# Patient Record
Sex: MALE | Race: WHITE | NOT HISPANIC OR LATINO | URBAN - METROPOLITAN AREA
[De-identification: names, ages, dates, MRNs, and addresses within clinical notes are randomized per-mention and may not be internally consistent; named-entity substitution may affect disease eponyms.]

---

## 2024-03-11 ENCOUNTER — LAB REQUISITION (OUTPATIENT)
Dept: LAB | Facility: HOSPITAL | Age: 89
End: 2024-03-11
Payer: COMMERCIAL

## 2024-03-11 DIAGNOSIS — F03.90 UNSPECIFIED DEMENTIA, UNSPECIFIED SEVERITY, WITHOUT BEHAVIORAL DISTURBANCE, PSYCHOTIC DISTURBANCE, MOOD DISTURBANCE, AND ANXIETY (MULTI): ICD-10-CM

## 2024-03-11 LAB
ALBUMIN SERPL BCP-MCNC: 3.5 G/DL (ref 3.4–5)
ALP SERPL-CCNC: 71 U/L (ref 33–136)
ALT SERPL W P-5'-P-CCNC: 8 U/L (ref 10–52)
ANION GAP SERPL CALC-SCNC: 11 MMOL/L (ref 10–20)
AST SERPL W P-5'-P-CCNC: 13 U/L (ref 9–39)
BILIRUB SERPL-MCNC: 0.8 MG/DL (ref 0–1.2)
BUN SERPL-MCNC: 17 MG/DL (ref 6–23)
CALCIUM SERPL-MCNC: 8.6 MG/DL (ref 8.6–10.3)
CHLORIDE SERPL-SCNC: 103 MMOL/L (ref 98–107)
CHOLEST SERPL-MCNC: 86 MG/DL (ref 0–199)
CHOLESTEROL/HDL RATIO: 3.4
CO2 SERPL-SCNC: 30 MMOL/L (ref 21–32)
CREAT SERPL-MCNC: 1.38 MG/DL (ref 0.5–1.3)
EGFRCR SERPLBLD CKD-EPI 2021: 48 ML/MIN/1.73M*2
ERYTHROCYTE [DISTWIDTH] IN BLOOD BY AUTOMATED COUNT: 13.7 % (ref 11.5–14.5)
EST. AVERAGE GLUCOSE BLD GHB EST-MCNC: 197 MG/DL
GLUCOSE SERPL-MCNC: 103 MG/DL (ref 74–99)
HBA1C MFR BLD: 8.5 %
HCT VFR BLD AUTO: 33.1 % (ref 41–52)
HDLC SERPL-MCNC: 25.3 MG/DL
HGB BLD-MCNC: 10.7 G/DL (ref 13.5–17.5)
LDLC SERPL CALC-MCNC: 40 MG/DL
MCH RBC QN AUTO: 30 PG (ref 26–34)
MCHC RBC AUTO-ENTMCNC: 32.3 G/DL (ref 32–36)
MCV RBC AUTO: 93 FL (ref 80–100)
NON HDL CHOLESTEROL: 61 MG/DL (ref 0–149)
NRBC BLD-RTO: 0 /100 WBCS (ref 0–0)
PLATELET # BLD AUTO: 227 X10*3/UL (ref 150–450)
POTASSIUM SERPL-SCNC: 3.6 MMOL/L (ref 3.5–5.3)
PROT SERPL-MCNC: 5.4 G/DL (ref 6.4–8.2)
RBC # BLD AUTO: 3.57 X10*6/UL (ref 4.5–5.9)
SODIUM SERPL-SCNC: 140 MMOL/L (ref 136–145)
TRIGL SERPL-MCNC: 102 MG/DL (ref 0–149)
TSH SERPL-ACNC: 3.45 MIU/L (ref 0.44–3.98)
VLDL: 20 MG/DL (ref 0–40)
WBC # BLD AUTO: 8.5 X10*3/UL (ref 4.4–11.3)

## 2024-03-11 PROCEDURE — 84443 ASSAY THYROID STIM HORMONE: CPT | Mod: OUT | Performed by: INTERNAL MEDICINE

## 2024-03-11 PROCEDURE — 80053 COMPREHEN METABOLIC PANEL: CPT | Mod: OUT | Performed by: INTERNAL MEDICINE

## 2024-03-11 PROCEDURE — 83036 HEMOGLOBIN GLYCOSYLATED A1C: CPT | Mod: OUT | Performed by: INTERNAL MEDICINE

## 2024-03-11 PROCEDURE — 80061 LIPID PANEL: CPT | Mod: OUT | Performed by: INTERNAL MEDICINE

## 2024-03-11 PROCEDURE — 85027 COMPLETE CBC AUTOMATED: CPT | Mod: OUT | Performed by: INTERNAL MEDICINE

## 2024-04-01 ENCOUNTER — LAB REQUISITION (OUTPATIENT)
Dept: LAB | Facility: HOSPITAL | Age: 89
DRG: 082 | End: 2024-04-01
Payer: COMMERCIAL

## 2024-04-01 DIAGNOSIS — E11.9 TYPE 2 DIABETES MELLITUS WITHOUT COMPLICATIONS (MULTI): ICD-10-CM

## 2024-04-01 DIAGNOSIS — E03.9 HYPOTHYROIDISM, UNSPECIFIED: ICD-10-CM

## 2024-04-01 LAB
T4 SERPL-MCNC: 7 UG/DL (ref 4.5–11.1)
TSH SERPL-ACNC: 11.74 MIU/L (ref 0.44–3.98)

## 2024-04-01 PROCEDURE — 84436 ASSAY OF TOTAL THYROXINE: CPT | Mod: OUT,PARLAB | Performed by: INTERNAL MEDICINE

## 2024-04-01 PROCEDURE — 84443 ASSAY THYROID STIM HORMONE: CPT | Mod: OUT | Performed by: INTERNAL MEDICINE

## 2024-04-10 ENCOUNTER — HOSPITAL ENCOUNTER (INPATIENT)
Facility: HOSPITAL | Age: 89
LOS: 10 days | Discharge: NURSING, REHAB OR LONG-TERM CARE | DRG: 082 | End: 2024-04-20
Attending: EMERGENCY MEDICINE | Admitting: SURGERY
Payer: COMMERCIAL

## 2024-04-10 ENCOUNTER — APPOINTMENT (OUTPATIENT)
Dept: RADIOLOGY | Facility: HOSPITAL | Age: 89
DRG: 082 | End: 2024-04-10
Payer: COMMERCIAL

## 2024-04-10 DIAGNOSIS — S06.5XAA SDH (SUBDURAL HEMATOMA) (MULTI): Primary | ICD-10-CM

## 2024-04-10 LAB
ABO GROUP (TYPE) IN BLOOD: NORMAL
ALBUMIN SERPL BCP-MCNC: 4.3 G/DL (ref 3.4–5)
ALP SERPL-CCNC: 86 U/L (ref 33–136)
ALT SERPL W P-5'-P-CCNC: 12 U/L (ref 10–52)
ANION GAP SERPL CALC-SCNC: 17 MMOL/L (ref 10–20)
ANTIBODY SCREEN: NORMAL
APTT PPP: 30 SECONDS (ref 27–38)
AST SERPL W P-5'-P-CCNC: 23 U/L (ref 9–39)
BASOPHILS # BLD AUTO: 0.05 X10*3/UL (ref 0–0.1)
BASOPHILS NFR BLD AUTO: 0.4 %
BILIRUB SERPL-MCNC: 1.5 MG/DL (ref 0–1.2)
BUN SERPL-MCNC: 16 MG/DL (ref 6–23)
CALCIUM SERPL-MCNC: 9.5 MG/DL (ref 8.6–10.3)
CHLORIDE SERPL-SCNC: 98 MMOL/L (ref 98–107)
CO2 SERPL-SCNC: 23 MMOL/L (ref 21–32)
CREAT SERPL-MCNC: 1.17 MG/DL (ref 0.5–1.3)
EGFRCR SERPLBLD CKD-EPI 2021: 58 ML/MIN/1.73M*2
EOSINOPHIL # BLD AUTO: 0.11 X10*3/UL (ref 0–0.4)
EOSINOPHIL NFR BLD AUTO: 0.9 %
ERYTHROCYTE [DISTWIDTH] IN BLOOD BY AUTOMATED COUNT: 14 % (ref 11.5–14.5)
GLUCOSE BLD MANUAL STRIP-MCNC: 138 MG/DL (ref 74–99)
GLUCOSE BLD MANUAL STRIP-MCNC: 142 MG/DL (ref 74–99)
GLUCOSE BLD MANUAL STRIP-MCNC: 151 MG/DL (ref 74–99)
GLUCOSE SERPL-MCNC: 179 MG/DL (ref 74–99)
HCT VFR BLD AUTO: 36.4 % (ref 41–52)
HGB BLD-MCNC: 11.8 G/DL (ref 13.5–17.5)
IMM GRANULOCYTES # BLD AUTO: 0.03 X10*3/UL (ref 0–0.5)
IMM GRANULOCYTES NFR BLD AUTO: 0.2 % (ref 0–0.9)
INR PPP: 1 (ref 0.9–1.1)
LYMPHOCYTES # BLD AUTO: 1.47 X10*3/UL (ref 0.8–3)
LYMPHOCYTES NFR BLD AUTO: 12 %
MCH RBC QN AUTO: 29.7 PG (ref 26–34)
MCHC RBC AUTO-ENTMCNC: 32.4 G/DL (ref 32–36)
MCV RBC AUTO: 92 FL (ref 80–100)
MONOCYTES # BLD AUTO: 0.73 X10*3/UL (ref 0.05–0.8)
MONOCYTES NFR BLD AUTO: 5.9 %
NEUTROPHILS # BLD AUTO: 9.88 X10*3/UL (ref 1.6–5.5)
NEUTROPHILS NFR BLD AUTO: 80.6 %
NRBC BLD-RTO: 0 /100 WBCS (ref 0–0)
PLATELET # BLD AUTO: 270 X10*3/UL (ref 150–450)
POTASSIUM SERPL-SCNC: 4.7 MMOL/L (ref 3.5–5.3)
PROT SERPL-MCNC: 7 G/DL (ref 6.4–8.2)
PROTHROMBIN TIME: 11.7 SECONDS (ref 9.8–12.8)
RBC # BLD AUTO: 3.97 X10*6/UL (ref 4.5–5.9)
RH FACTOR (ANTIGEN D): NORMAL
SODIUM SERPL-SCNC: 133 MMOL/L (ref 136–145)
WBC # BLD AUTO: 12.3 X10*3/UL (ref 4.4–11.3)

## 2024-04-10 PROCEDURE — 72125 CT NECK SPINE W/O DYE: CPT | Performed by: SURGERY

## 2024-04-10 PROCEDURE — 72125 CT NECK SPINE W/O DYE: CPT

## 2024-04-10 PROCEDURE — 2500000002 HC RX 250 W HCPCS SELF ADMINISTERED DRUGS (ALT 637 FOR MEDICARE OP, ALT 636 FOR OP/ED): Performed by: NURSE PRACTITIONER

## 2024-04-10 PROCEDURE — 72170 X-RAY EXAM OF PELVIS: CPT | Mod: FOREIGN READ | Performed by: RADIOLOGY

## 2024-04-10 PROCEDURE — 71045 X-RAY EXAM CHEST 1 VIEW: CPT

## 2024-04-10 PROCEDURE — 99222 1ST HOSP IP/OBS MODERATE 55: CPT | Performed by: NURSE PRACTITIONER

## 2024-04-10 PROCEDURE — 36415 COLL VENOUS BLD VENIPUNCTURE: CPT | Performed by: EMERGENCY MEDICINE

## 2024-04-10 PROCEDURE — 82947 ASSAY GLUCOSE BLOOD QUANT: CPT

## 2024-04-10 PROCEDURE — 70450 CT HEAD/BRAIN W/O DYE: CPT | Performed by: RADIOLOGY

## 2024-04-10 PROCEDURE — 73552 X-RAY EXAM OF FEMUR 2/>: CPT | Mod: RIGHT SIDE | Performed by: RADIOLOGY

## 2024-04-10 PROCEDURE — 85025 COMPLETE CBC W/AUTO DIFF WBC: CPT | Performed by: EMERGENCY MEDICINE

## 2024-04-10 PROCEDURE — 70450 CT HEAD/BRAIN W/O DYE: CPT | Performed by: SURGERY

## 2024-04-10 PROCEDURE — 71045 X-RAY EXAM CHEST 1 VIEW: CPT | Mod: FOREIGN READ | Performed by: RADIOLOGY

## 2024-04-10 PROCEDURE — 72170 X-RAY EXAM OF PELVIS: CPT

## 2024-04-10 PROCEDURE — 85730 THROMBOPLASTIN TIME PARTIAL: CPT | Performed by: EMERGENCY MEDICINE

## 2024-04-10 PROCEDURE — 2060000001 HC INTERMEDIATE ICU ROOM DAILY

## 2024-04-10 PROCEDURE — 73552 X-RAY EXAM OF FEMUR 2/>: CPT | Mod: RT

## 2024-04-10 PROCEDURE — 70450 CT HEAD/BRAIN W/O DYE: CPT

## 2024-04-10 PROCEDURE — 2500000001 HC RX 250 WO HCPCS SELF ADMINISTERED DRUGS (ALT 637 FOR MEDICARE OP): Performed by: INTERNAL MEDICINE

## 2024-04-10 PROCEDURE — 85610 PROTHROMBIN TIME: CPT | Performed by: EMERGENCY MEDICINE

## 2024-04-10 PROCEDURE — 86901 BLOOD TYPING SEROLOGIC RH(D): CPT | Performed by: EMERGENCY MEDICINE

## 2024-04-10 PROCEDURE — 2500000001 HC RX 250 WO HCPCS SELF ADMINISTERED DRUGS (ALT 637 FOR MEDICARE OP): Performed by: NURSE PRACTITIONER

## 2024-04-10 PROCEDURE — 99291 CRITICAL CARE FIRST HOUR: CPT | Performed by: EMERGENCY MEDICINE

## 2024-04-10 PROCEDURE — 2500000004 HC RX 250 GENERAL PHARMACY W/ HCPCS (ALT 636 FOR OP/ED): Performed by: NURSE PRACTITIONER

## 2024-04-10 PROCEDURE — 84075 ASSAY ALKALINE PHOSPHATASE: CPT | Performed by: EMERGENCY MEDICINE

## 2024-04-10 RX ORDER — INSULIN LISPRO 100 [IU]/ML
1 INJECTION, SOLUTION INTRAVENOUS; SUBCUTANEOUS
COMMUNITY

## 2024-04-10 RX ORDER — POLYETHYLENE GLYCOL 3350 17 G/17G
17 POWDER, FOR SOLUTION ORAL DAILY
COMMUNITY

## 2024-04-10 RX ORDER — ATORVASTATIN CALCIUM 40 MG/1
40 TABLET, FILM COATED ORAL DAILY
Status: DISCONTINUED | OUTPATIENT
Start: 2024-04-10 | End: 2024-04-20 | Stop reason: HOSPADM

## 2024-04-10 RX ORDER — ACETAMINOPHEN 325 MG/1
650 TABLET ORAL EVERY 6 HOURS PRN
COMMUNITY

## 2024-04-10 RX ORDER — CLONIDINE HYDROCHLORIDE 0.2 MG/1
0.2 TABLET ORAL AS NEEDED
Status: DISCONTINUED | OUTPATIENT
Start: 2024-04-10 | End: 2024-04-10

## 2024-04-10 RX ORDER — POLYETHYLENE GLYCOL 3350 17 G/17G
17 POWDER, FOR SOLUTION ORAL DAILY
Status: DISCONTINUED | OUTPATIENT
Start: 2024-04-10 | End: 2024-04-20 | Stop reason: HOSPADM

## 2024-04-10 RX ORDER — SPIRONOLACTONE 25 MG/1
25 TABLET ORAL DAILY
COMMUNITY

## 2024-04-10 RX ORDER — DEXTROSE 50 % IN WATER (D50W) INTRAVENOUS SYRINGE
12.5
Status: DISCONTINUED | OUTPATIENT
Start: 2024-04-10 | End: 2024-04-20 | Stop reason: HOSPADM

## 2024-04-10 RX ORDER — HYDRALAZINE HYDROCHLORIDE 20 MG/ML
5 INJECTION INTRAMUSCULAR; INTRAVENOUS EVERY 6 HOURS PRN
Status: DISCONTINUED | OUTPATIENT
Start: 2024-04-10 | End: 2024-04-20 | Stop reason: HOSPADM

## 2024-04-10 RX ORDER — CLONIDINE HYDROCHLORIDE 0.2 MG/1
0.2 TABLET ORAL AS NEEDED
COMMUNITY

## 2024-04-10 RX ORDER — DEXTROSE 50 % IN WATER (D50W) INTRAVENOUS SYRINGE
25
Status: DISCONTINUED | OUTPATIENT
Start: 2024-04-10 | End: 2024-04-20 | Stop reason: HOSPADM

## 2024-04-10 RX ORDER — TAMSULOSIN HYDROCHLORIDE 0.4 MG/1
0.4 CAPSULE ORAL DAILY
Status: DISCONTINUED | OUTPATIENT
Start: 2024-04-10 | End: 2024-04-20 | Stop reason: HOSPADM

## 2024-04-10 RX ORDER — ACETAMINOPHEN 325 MG/1
650 TABLET ORAL EVERY 6 HOURS PRN
Status: DISCONTINUED | OUTPATIENT
Start: 2024-04-10 | End: 2024-04-11

## 2024-04-10 RX ORDER — LISINOPRIL 5 MG/1
5 TABLET ORAL DAILY
COMMUNITY

## 2024-04-10 RX ORDER — IPRATROPIUM BROMIDE AND ALBUTEROL SULFATE 2.5; .5 MG/3ML; MG/3ML
3 SOLUTION RESPIRATORY (INHALATION) EVERY 2 HOUR PRN
Status: DISCONTINUED | OUTPATIENT
Start: 2024-04-10 | End: 2024-04-20 | Stop reason: HOSPADM

## 2024-04-10 RX ORDER — ATORVASTATIN CALCIUM 40 MG/1
40 TABLET, FILM COATED ORAL DAILY
COMMUNITY

## 2024-04-10 RX ORDER — FAMOTIDINE 20 MG/1
20 TABLET, FILM COATED ORAL DAILY
COMMUNITY

## 2024-04-10 RX ORDER — LEVOTHYROXINE SODIUM 150 UG/1
150 TABLET ORAL
Status: DISCONTINUED | OUTPATIENT
Start: 2024-04-11 | End: 2024-04-20 | Stop reason: HOSPADM

## 2024-04-10 RX ORDER — DICLOFENAC SODIUM 10 MG/G
4 GEL TOPICAL 3 TIMES DAILY PRN
COMMUNITY

## 2024-04-10 RX ORDER — METFORMIN HYDROCHLORIDE 750 MG/1
750 TABLET, EXTENDED RELEASE ORAL 2 TIMES DAILY
COMMUNITY

## 2024-04-10 RX ORDER — IPRATROPIUM BROMIDE AND ALBUTEROL SULFATE 2.5; .5 MG/3ML; MG/3ML
3 SOLUTION RESPIRATORY (INHALATION) EVERY 6 HOURS PRN
COMMUNITY

## 2024-04-10 RX ORDER — TAMSULOSIN HYDROCHLORIDE 0.4 MG/1
0.4 CAPSULE ORAL DAILY
COMMUNITY

## 2024-04-10 RX ORDER — LISINOPRIL 5 MG/1
5 TABLET ORAL DAILY
Status: DISCONTINUED | OUTPATIENT
Start: 2024-04-10 | End: 2024-04-20 | Stop reason: HOSPADM

## 2024-04-10 RX ORDER — INSULIN GLARGINE 100 [IU]/ML
17 INJECTION, SOLUTION SUBCUTANEOUS EVERY 24 HOURS
COMMUNITY

## 2024-04-10 RX ORDER — MORPHINE SULFATE 4 MG/ML
4 INJECTION, SOLUTION INTRAMUSCULAR; INTRAVENOUS ONCE
Status: DISCONTINUED | OUTPATIENT
Start: 2024-04-10 | End: 2024-04-10

## 2024-04-10 RX ORDER — PANTOPRAZOLE SODIUM 20 MG/1
20 TABLET, DELAYED RELEASE ORAL
Status: DISCONTINUED | OUTPATIENT
Start: 2024-04-11 | End: 2024-04-20 | Stop reason: HOSPADM

## 2024-04-10 RX ORDER — DONEPEZIL HYDROCHLORIDE 10 MG/1
10 TABLET, FILM COATED ORAL 2 TIMES DAILY
Status: DISCONTINUED | OUTPATIENT
Start: 2024-04-10 | End: 2024-04-20 | Stop reason: HOSPADM

## 2024-04-10 RX ORDER — LEVOTHYROXINE SODIUM 150 UG/1
150 TABLET ORAL
COMMUNITY

## 2024-04-10 RX ORDER — IPRATROPIUM BROMIDE AND ALBUTEROL SULFATE 2.5; .5 MG/3ML; MG/3ML
3 SOLUTION RESPIRATORY (INHALATION) EVERY 6 HOURS PRN
Status: DISCONTINUED | OUTPATIENT
Start: 2024-04-10 | End: 2024-04-10

## 2024-04-10 RX ORDER — INSULIN GLARGINE 100 [IU]/ML
17 INJECTION, SOLUTION SUBCUTANEOUS EVERY 24 HOURS
Status: DISCONTINUED | OUTPATIENT
Start: 2024-04-10 | End: 2024-04-20 | Stop reason: HOSPADM

## 2024-04-10 RX ORDER — DONEPEZIL HYDROCHLORIDE 23 MG/1
23 TABLET, FILM COATED ORAL NIGHTLY
COMMUNITY

## 2024-04-10 RX ORDER — ONDANSETRON HYDROCHLORIDE 2 MG/ML
4 INJECTION, SOLUTION INTRAVENOUS EVERY 6 HOURS PRN
Status: DISCONTINUED | OUTPATIENT
Start: 2024-04-10 | End: 2024-04-20 | Stop reason: HOSPADM

## 2024-04-10 RX ORDER — FAMOTIDINE 20 MG/1
20 TABLET, FILM COATED ORAL DAILY
Status: DISCONTINUED | OUTPATIENT
Start: 2024-04-10 | End: 2024-04-20 | Stop reason: HOSPADM

## 2024-04-10 RX ORDER — OMEPRAZOLE 20 MG/1
20 CAPSULE, DELAYED RELEASE ORAL DAILY
COMMUNITY

## 2024-04-10 RX ORDER — TRIAMCINOLONE ACETONIDE 1 MG/G
OINTMENT TOPICAL 2 TIMES DAILY
Status: DISCONTINUED | OUTPATIENT
Start: 2024-04-10 | End: 2024-04-20 | Stop reason: HOSPADM

## 2024-04-10 RX ORDER — SPIRONOLACTONE 25 MG/1
25 TABLET ORAL DAILY
Status: DISCONTINUED | OUTPATIENT
Start: 2024-04-10 | End: 2024-04-20 | Stop reason: HOSPADM

## 2024-04-10 RX ADMIN — ATORVASTATIN CALCIUM 40 MG: 40 TABLET, FILM COATED ORAL at 18:28

## 2024-04-10 RX ADMIN — TRIAMCINOLONE ACETONIDE: 1 OINTMENT TOPICAL at 20:29

## 2024-04-10 RX ADMIN — SPIRONOLACTONE 25 MG: 25 TABLET, FILM COATED ORAL at 18:28

## 2024-04-10 RX ADMIN — FAMOTIDINE 20 MG: 20 TABLET ORAL at 18:28

## 2024-04-10 RX ADMIN — INSULIN HUMAN 2 UNITS: 100 INJECTION, SOLUTION PARENTERAL at 18:29

## 2024-04-10 RX ADMIN — DONEPEZIL HYDROCHLORIDE 10 MG: 10 TABLET ORAL at 20:18

## 2024-04-10 RX ADMIN — INSULIN GLARGINE 17 UNITS: 100 INJECTION, SOLUTION SUBCUTANEOUS at 18:28

## 2024-04-10 RX ADMIN — POLYETHYLENE GLYCOL 3350 17 G: 17 POWDER, FOR SOLUTION ORAL at 18:00

## 2024-04-10 RX ADMIN — LISINOPRIL 5 MG: 5 TABLET ORAL at 18:28

## 2024-04-10 RX ADMIN — ACETAMINOPHEN 650 MG: 325 TABLET ORAL at 20:18

## 2024-04-10 RX ADMIN — TAMSULOSIN HYDROCHLORIDE 0.4 MG: 0.4 CAPSULE ORAL at 18:27

## 2024-04-10 ASSESSMENT — COGNITIVE AND FUNCTIONAL STATUS - GENERAL
DRESSING REGULAR LOWER BODY CLOTHING: TOTAL
HELP NEEDED FOR BATHING: A LOT
EATING MEALS: A LITTLE
WALKING IN HOSPITAL ROOM: A LOT
TURNING FROM BACK TO SIDE WHILE IN FLAT BAD: A LOT
DRESSING REGULAR UPPER BODY CLOTHING: A LITTLE
CLIMB 3 TO 5 STEPS WITH RAILING: A LOT
MOVING TO AND FROM BED TO CHAIR: A LOT
DAILY ACTIVITIY SCORE: 14
STANDING UP FROM CHAIR USING ARMS: A LOT
MOBILITY SCORE: 14
PERSONAL GROOMING: A LITTLE
TOILETING: A LOT

## 2024-04-10 ASSESSMENT — COLUMBIA-SUICIDE SEVERITY RATING SCALE - C-SSRS
2. HAVE YOU ACTUALLY HAD ANY THOUGHTS OF KILLING YOURSELF?: NO
1. IN THE PAST MONTH, HAVE YOU WISHED YOU WERE DEAD OR WISHED YOU COULD GO TO SLEEP AND NOT WAKE UP?: NO
6. HAVE YOU EVER DONE ANYTHING, STARTED TO DO ANYTHING, OR PREPARED TO DO ANYTHING TO END YOUR LIFE?: NO

## 2024-04-10 ASSESSMENT — PAIN - FUNCTIONAL ASSESSMENT
PAIN_FUNCTIONAL_ASSESSMENT: 0-10
PAIN_FUNCTIONAL_ASSESSMENT: 0-10
PAIN_FUNCTIONAL_ASSESSMENT: WONG-BAKER FACES

## 2024-04-10 ASSESSMENT — PAIN SCALES - WONG BAKER: WONGBAKER_NUMERICALRESPONSE: HURTS LITTLE BIT

## 2024-04-10 ASSESSMENT — PAIN DESCRIPTION - LOCATION: LOCATION: LEG

## 2024-04-10 ASSESSMENT — PAIN DESCRIPTION - ORIENTATION: ORIENTATION: RIGHT

## 2024-04-10 ASSESSMENT — PAIN DESCRIPTION - DESCRIPTORS: DESCRIPTORS: ACHING

## 2024-04-10 ASSESSMENT — PAIN DESCRIPTION - PAIN TYPE: TYPE: ACUTE PAIN

## 2024-04-10 ASSESSMENT — PAIN SCALES - GENERAL
PAINLEVEL_OUTOF10: 5 - MODERATE PAIN
PAINLEVEL_OUTOF10: 0 - NO PAIN
PAINLEVEL_OUTOF10: 5 - MODERATE PAIN

## 2024-04-10 NOTE — H&P
History Of Present Illness  Roman Lakhani is a 89 y.o. Cambodian speaking male with past medical history significant for dementia, CKD, DM, COPD w/ chronic resp failure (O2 dependent), paranoid schizophrenia, cardiomyopathy, anxiety/depression, HTN, HLD, BPH, and GERD who presented to [facility] ED 4/10 from [facility] s/p unwitnessed fall. Majority of history collected from chart and staff d/t dementia and language barrier.     In the ED, patient hemodynamically stable, afebrile. Labs remarkable for WBC 12.3, Na 13, glucose 179. Patient pan scanned with the following significant results: acute on chronic right frontoparietal SDH measuring up to 2.8cm as well as cerebral sulcal effacement, narrowing of the right lateral and 3rd ventricles and 5mm leftward midline shift. ED physician discussed patient with NSGY with recommendations for nonop management, ok for patient to remain at [place]. Patient admitted to trauma service with consult to NSGY for continued management.     A: Airway intact  B: Breathing spontaneously, breath sounds are bilateral and equal  C: Pulses 2+throughout and equal.    D: Pupils equal and reactive, GCS 14 (E4, V4, M6). Moving all 4 extremities  E: Patient exposed and additional injuries noted; Warm blankets placed on patient    Secondary Survey:  NEURO: GCS 14, CN II-XII intact, VERNON equally, muscle strength 5/5  HEAD: NC/AT, No lacerations or abrasions, no bony step offs, midface stable.  EENT: PERRL, EOMI. Pupils 4-2mm b/l. external ear without laceration. Nasal septum midline, no crepitus or septal hematoma. Oral mucosa and tongue without lacerations, teeth in place.   NECK: No cervical spine tenderness or step offs, no lacerations or abrasions, trachea midline. No JVD.  RESPIRATORY/CHEST: No abrasions, contusions, crepitus or tenderness to palpation. Non-labored, equal chest expansion, CTAB, no W/R/R.  CV: RRR. Pulses bilateral: 2+ radial, 2+DP. No TTP of chest  ABDOMEN:  "soft, nontender, mild diffuse tenderness on palpation. No scars, abrasions or lacerations.  PELVIS: Stable to compression.  : nml external genitalia, no blood at urethral meatus  RECTAL: rectal tone deferred.  BACK/SPINE: No thoracic midline tenderness, step-offs or deformities. No lumbar midline tenderness, step-offs, or deformities.  No abrasions, hematomas or lacerations noted.  EXTREMITIES: Mild BLE edema. Nml ROM. Pain on palpation right thigh without obvious outward signs of trauma. No deformities, lacerations or contusions.       Past Medical History  No past medical history on file.    Surgical History  No past surgical history on file.     Social History  He has no history on file for tobacco use, alcohol use, and drug use.    Family History  No family history on file.     Allergies  Patient has no known allergies.    Review of Systems     Physical Exam     Last Recorded Vitals  Blood pressure 133/71, pulse 84, temperature 36.6 °C (97.9 °F), resp. rate 18, height 1.676 m (5' 6\"), weight 81.6 kg (180 lb), SpO2 98%.    Relevant Results        Results for orders placed or performed during the hospital encounter of 04/10/24 (from the past 24 hour(s))   CBC and Auto Differential   Result Value Ref Range    WBC 12.3 (H) 4.4 - 11.3 x10*3/uL    nRBC 0.0 0.0 - 0.0 /100 WBCs    RBC 3.97 (L) 4.50 - 5.90 x10*6/uL    Hemoglobin 11.8 (L) 13.5 - 17.5 g/dL    Hematocrit 36.4 (L) 41.0 - 52.0 %    MCV 92 80 - 100 fL    MCH 29.7 26.0 - 34.0 pg    MCHC 32.4 32.0 - 36.0 g/dL    RDW 14.0 11.5 - 14.5 %    Platelets 270 150 - 450 x10*3/uL    Neutrophils % 80.6 40.0 - 80.0 %    Immature Granulocytes %, Automated 0.2 0.0 - 0.9 %    Lymphocytes % 12.0 13.0 - 44.0 %    Monocytes % 5.9 2.0 - 10.0 %    Eosinophils % 0.9 0.0 - 6.0 %    Basophils % 0.4 0.0 - 2.0 %    Neutrophils Absolute 9.88 (H) 1.60 - 5.50 x10*3/uL    Immature Granulocytes Absolute, Automated 0.03 0.00 - 0.50 x10*3/uL    Lymphocytes Absolute 1.47 0.80 - 3.00 x10*3/uL "    Monocytes Absolute 0.73 0.05 - 0.80 x10*3/uL    Eosinophils Absolute 0.11 0.00 - 0.40 x10*3/uL    Basophils Absolute 0.05 0.00 - 0.10 x10*3/uL   Comprehensive metabolic panel   Result Value Ref Range    Glucose 179 (H) 74 - 99 mg/dL    Sodium 133 (L) 136 - 145 mmol/L    Potassium 4.7 3.5 - 5.3 mmol/L    Chloride 98 98 - 107 mmol/L    Bicarbonate 23 21 - 32 mmol/L    Anion Gap 17 10 - 20 mmol/L    Urea Nitrogen 16 6 - 23 mg/dL    Creatinine 1.17 0.50 - 1.30 mg/dL    eGFR 58 (L) >60 mL/min/1.73m*2    Calcium 9.5 8.6 - 10.3 mg/dL    Albumin 4.3 3.4 - 5.0 g/dL    Alkaline Phosphatase 86 33 - 136 U/L    Total Protein 7.0 6.4 - 8.2 g/dL    AST 23 9 - 39 U/L    Bilirubin, Total 1.5 (H) 0.0 - 1.2 mg/dL    ALT 12 10 - 52 U/L   aPTT   Result Value Ref Range    aPTT 30 27 - 38 seconds   Protime-INR   Result Value Ref Range    Protime 11.7 9.8 - 12.8 seconds    INR 1.0 0.9 - 1.1   Type and Screen   Result Value Ref Range    ABO TYPE O     Rh TYPE POS     ANTIBODY SCREEN NEG      XR femur right 2+ views    Result Date: 4/10/2024  STUDY: Femur Radiographs; 4/10/2024 6:12 AM INDICATION: Trauma. COMPARISON: None Available. ACCESSION NUMBER(S): [id number] ORDERING CLINICIAN: IRVING BECERRA TECHNIQUE:  Four view(s) of the right femur. FINDINGS:  Right-sided superior and inferior pubic rami and ischium normal. No fracture. The iliopectineal line on the right is normal. The femoral head is localized normally in the acetabular fossa. Femoral head appears to be normal. Femoral neck and intertrochanteric region appear to be normal. No fracture or trabecular line disruption. The femoral shaft appears to be normal. There are vessel calcifications identified. Small knee effusion suspected. Patellofemoral, medial and lateral compartment severe degenerative changes are identified. No definite evidence of fracture involving the distal femoral shaft or the proximal tibial plateau or proximal fibula in their limited visualized extent.    No  fracture or subluxation of the right femur. Severe tricompartmental degenerative changes of the right knee. Small knee effusion suspected. Signed by Godwin Riggs MD    XR pelvis 1-2 views    Result Date: 4/10/2024  STUDY: Pelvis Radiographs; 4/10/2024 6:12 AM INDICATION: Trauma. COMPARISON: None Available. ACCESSION NUMBER(S): [id number] ORDERING CLINICIAN: IRVING BECERRA TECHNIQUE:  One view(s) of the pelvis. FINDINGS:  Lower lumbar spine degenerative changes. Iliac wings normal with esthesiopathic changes. SI joint show degenerative changes. Bilateral hip DJD.  Superior and inferior pubic rami normal. Both hips appear to be localized normally in the acetabulum. Intertrochanteric region normal. Femoral head normal. Femoral neck appears normal. No distinct evidence of fracture identified trabecular lines appear to be normal. Proximal femoral shafts appear normal. Visualized portions of the pelvis remarkable for phleboliths.    Degenerative changes of the lower lumbar spine, hips  and SI joints. No evidence of fracture or subluxation. Signed by Godwin Riggs MD    XR chest 1 view    Result Date: 4/10/2024  STUDY: Chest Radiograph;  4/10/2024 6:12 AM INDICATION: Cough. COMPARISON: None Available. ACCESSION NUMBER(S): [id number] ORDERING CLINICIAN: IRVING BECERRA TECHNIQUE:  Frontal chest was obtained at 06:12 hours. FINDINGS: CARDIOMEDIASTINAL SILHOUETTE: Cardiomediastinal silhouette is normal in size and configuration. Calcified mediastinal lymph nodes. Airway normal.  LUNGS: Lungs are clear. Basilar atelectasis and scarring. No pneumothorax. No focal nodule  ABDOMEN: No remarkable upper abdominal findings. No free air.  BONES: No acute osseous changes. Degenerative changes of both shoulders and thoracic spine. No focal rib abnormality is identified.    Normal size heart. Evidence of prior granulomatous disease. Basilar atelectatic changes. No focal infiltrate. Signed by Godwin Riggs MD    CT head wo  IV contrast    Result Date: 4/10/2024  Interpreted By:  Arnulfo Moncada, STUDY: CT HEAD WO IV CONTRAST; CT CERVICAL SPINE WO IV CONTRAST; ; 4/10/2024 6:00 am   INDICATION: Signs/Symptoms:trauma.   COMPARISON: None.   ACCESSION NUMBER(S): [id number]; [id number]   ORDERING CLINICIAN: IRVING BECERRA   TECHNIQUE: Noncontrast CT exams of the head and cervical spine with multiplanar reformations.   FINDINGS: BRAIN PARENCHYMA: Gray-white matter interfaces are preserved. 5 mm of leftward midline shift at the level of foramen of Monro.   HEMORRHAGE: There is an acute on chronic right frontoparietal subdural hematoma measuring up to 2.8 cm in maximal thickness along the anterolateral frontal convexity. VENTRICLES and EXTRA-AXIAL SPACES: There is cerebral sulcal effacement. Asymmetric narrowing of the right lateral ventricle and narrowing of the 3rd ventricle. No definite hydrocephalus. EXTRACRANIAL SOFT TISSUES: Small right posterior scalp hematoma. PARANASAL SINUSES/MASTOIDS: The visualized paranasal sinuses and mastoid air cells are aerated. CALVARIUM: No depressed skull fracture. No destructive osseous lesion.   OTHER FINDINGS: None.   CERVICAL SPINE:   Mild reversal of the normal cervical lordosis could reflect positioning or muscle spasm. ALIGNMENT: Grade 1 degenerative anterolisthesis at C2-C3 and C3-C4. Trace degenerative anterolisthesis at C7-T1. Alignment is otherwise maintained. VERTEBRAE: No acute fracture is seen. Vertebral stature is maintained. Degenerative endplate osteophytosis and uncovertebral hypertrophy in conjunction with moderate to severe degenerative disc height loss, overall worst at C7-T1. Moderate to severe multilevel hypertrophic facet osteoarthropathy. SPINAL CANAL: No critical spinal canal stenosis. PREVERTEBRAL SOFT TISSUES: No prevertebral soft tissue swelling. LUNG APICES: Imaged portion of the lung apices are within normal limits.   OTHER FINDINGS: None.       There is an acute on chronic  right frontoparietal subdural hematoma measuring up to 2.8 cm in maximal thickness along the anterolateral frontal convexity. There is cerebral sulcal effacement, narrowing of the right lateral and 3rd ventricles and 5 mm of leftward midline shift. Neurosurgical evaluation recommended.   No acute cervical spine fracture or malalignment. Severe spondylosis.     MACRO: Arnulfo Moncada discussed the significance and urgency of this critical finding by epic secure chat with  IRVING BECERRA on 4/10/2024 at 6:25 am.  (**-RCF-**) Findings:  See findings.     Signed by: Arnulfo Moncada 4/10/2024 6:25 AM Dictation workstation:   [id number]    CT cervical spine wo IV contrast    Result Date: 4/10/2024  Interpreted By:  Arnulfo Moncada, STUDY: CT HEAD WO IV CONTRAST; CT CERVICAL SPINE WO IV CONTRAST; ; 4/10/2024 6:00 am   INDICATION: Signs/Symptoms:trauma.   COMPARISON: None.   ACCESSION NUMBER(S): [id number]; [id number]   ORDERING CLINICIAN: IRVING BECERRA   TECHNIQUE: Noncontrast CT exams of the head and cervical spine with multiplanar reformations.   FINDINGS: BRAIN PARENCHYMA: Gray-white matter interfaces are preserved. 5 mm of leftward midline shift at the level of foramen of Monro.   HEMORRHAGE: There is an acute on chronic right frontoparietal subdural hematoma measuring up to 2.8 cm in maximal thickness along the anterolateral frontal convexity. VENTRICLES and EXTRA-AXIAL SPACES: There is cerebral sulcal effacement. Asymmetric narrowing of the right lateral ventricle and narrowing of the 3rd ventricle. No definite hydrocephalus. EXTRACRANIAL SOFT TISSUES: Small right posterior scalp hematoma. PARANASAL SINUSES/MASTOIDS: The visualized paranasal sinuses and mastoid air cells are aerated. CALVARIUM: No depressed skull fracture. No destructive osseous lesion.   OTHER FINDINGS: None.   CERVICAL SPINE:   Mild reversal of the normal cervical lordosis could reflect positioning or muscle spasm. ALIGNMENT: Grade 1 degenerative  anterolisthesis at C2-C3 and C3-C4. Trace degenerative anterolisthesis at C7-T1. Alignment is otherwise maintained. VERTEBRAE: No acute fracture is seen. Vertebral stature is maintained. Degenerative endplate osteophytosis and uncovertebral hypertrophy in conjunction with moderate to severe degenerative disc height loss, overall worst at C7-T1. Moderate to severe multilevel hypertrophic facet osteoarthropathy. SPINAL CANAL: No critical spinal canal stenosis. PREVERTEBRAL SOFT TISSUES: No prevertebral soft tissue swelling. LUNG APICES: Imaged portion of the lung apices are within normal limits.   OTHER FINDINGS: None.       There is an acute on chronic right frontoparietal subdural hematoma measuring up to 2.8 cm in maximal thickness along the anterolateral frontal convexity. There is cerebral sulcal effacement, narrowing of the right lateral and 3rd ventricles and 5 mm of leftward midline shift. Neurosurgical evaluation recommended.   No acute cervical spine fracture or malalignment. Severe spondylosis.     MACRO: Arnulfo Moncada discussed the significance and urgency of this critical finding by epic secure chat with  IRVING BECERRA on 4/10/2024 at 6:25 am.  (**-RCF-**) Findings:  See findings.     Signed by: Arnulfo Moncada 4/10/2024 6:25 AM Dictation workstation:   [id number]        Assessment/Plan   Principal Problem:    SDH (subdural hematoma) (CMS/HCC)    Roman Lakhani is a 89 y.o. Austrian speaking male with past medical history significant for dementia, CKD, DM, COPD w/ chronic resp failure (O2 dependent), paranoid schizophrenia, cardiomyopathy, anxiety/depression, HTN, HLD, BPH, and GERD who presented to [facility] ED 4/10 from [facility] [place] s/p unwitnessed fall. Patient pan scanned with the following significant results: acute on chronic right frontoparietal SDH measuring up to 2.8cm as well as cerebral sulcal effacement, narrowing of the right lateral and 3rd ventricles and 5mm leftward midline  shift. Patient admitted to trauma service with consult to NSGY for continued management.     List of clinically significant injuries/problems:  Acute on chronic 2.8 cm SDH with 5 mm leftward midline shift  Small right knee effusion with severe tricompartmental degenerative changes  Leukocytosis  Hyperglycemia  Bed bugs    Assessment/Plan:    # SDH  - NSGY consulted, appreciate input  - pending repeat head CT at 1230     -> NPO pending stability of repeat CT  - maintain HOB >30 degrees  - maintain SBP <160  - Q4H neurochecks  - avoid narcotics if possible given hx dementia with TBI d/t concern for development of delirium  - acetaminophen prn for pain  - PT/OT    # Leukocytosis  - likely reactive  - if uptrending will look for additional causes  - am labs    # Dementia  # DM  # CKD  # COPD  # Chronic resp failure (O2 dependent)  # HTN  # HLD  - medicine consulted  - ISS while inpatient  - restart home meds when med rec complete and medically appropriate     # Bed bugs  - contact precautions    # DVT ppx  - hold until cleared by NSGY  - SCD's    Plan not finalized until discussed with Dr. Pickens           I spent 60 minutes in the professional and overall care of this patient.      Monique Lenz, APRN-CNP

## 2024-04-10 NOTE — CONSULTS
Consults    Reason For Consult  Patient fully evaluated on April 10.    History Of Present Illness  Roman Lakhani is a 89 y.o. male presenting with with fall at his skilled nursing with subdural hematoma..     Past Medical History  He has no past medical history on file.    Surgical History  He has no past surgical history on file.     Social History  He has no history on file for tobacco use, alcohol use, and drug use.    Family History  No family history on file.     Allergies  Patient has no known allergies.    Review of Systems       Physical Exam         Last Recorded Vitals  /74   Pulse 79   Temp 36.3 °C (97.3 °F)   Resp 18   Wt 81.6 kg (180 lb)   SpO2 94%     Relevant Results       Assessment/Plan            Date of Service: 4/10/2024  7:36 AM     Attested Addendum       Expand All Collapse All    History Of Present Illness  Roman Lakhani is a 89 y.o. Citizen of the [place] speaking male with past medical history significant for dementia, CKD, DM, COPD w/ chronic resp failure (O2 dependent), paranoid schizophrenia, cardiomyopathy, anxiety/depression, HTN, HLD, BPH, and GERD who presented to [facility] ED 4/10 from [facility] s/p unwitnessed fall. Majority of history collected from chart and staff d/t dementia and language barrier.      In the ED, patient hemodynamically stable, afebrile. Labs remarkable for WBC 12.3, Na 13, glucose 179. Patient pan scanned with the following significant results: acute on chronic right frontoparietal SDH measuring up to 2.8cm as well as cerebral sulcal effacement, narrowing of the right lateral and 3rd ventricles and 5mm leftward midline shift. ED physician discussed patient with NSGY with recommendations for nonop management, ok for patient to remain at [place]. Patient admitted to trauma service with consult to NSGY for continued management.      A: Airway intact  B: Breathing spontaneously, breath sounds are bilateral and equal  C: Pulses 2+throughout and  "equal.    D: Pupils equal and reactive, GCS 14 (E4, V4, M6). Moving all 4 extremities  E: Patient exposed and additional injuries noted; Warm blankets placed on patient     Secondary Survey:  NEURO: GCS 14, CN II-XII intact, VERNON equally, muscle strength 5/5  HEAD: NC/AT, No lacerations or abrasions, no bony step offs, midface stable.  EENT: PERRL, EOMI. Pupils 4-2mm b/l. external ear without laceration. Nasal septum midline, no crepitus or septal hematoma. Oral mucosa and tongue without lacerations, teeth in place.   NECK: No cervical spine tenderness or step offs, no lacerations or abrasions, trachea midline. No JVD.  RESPIRATORY/CHEST: No abrasions, contusions, crepitus or tenderness to palpation. Non-labored, equal chest expansion, CTAB, no W/R/R.  CV: RRR. Pulses bilateral: 2+ radial, 2+DP. No TTP of chest  ABDOMEN: soft, nontender, mild diffuse tenderness on palpation. No scars, abrasions or lacerations.  PELVIS: Stable to compression.  : nml external genitalia, no blood at urethral meatus  RECTAL: rectal tone deferred.  BACK/SPINE: No thoracic midline tenderness, step-offs or deformities. No lumbar midline tenderness, step-offs, or deformities.  No abrasions, hematomas or lacerations noted.  EXTREMITIES: Mild BLE edema. Nml ROM. Pain on palpation right thigh without obvious outward signs of trauma. No deformities, lacerations or contusions.        Past Medical History  Medical History   No past medical history on file.        Surgical History  Surgical History   No past surgical history on file.        Social History  He has no history on file for tobacco use, alcohol use, and drug use.     Family History  Family History   No family history on file.        Allergies  Patient has no known allergies.     Review of Systems     Physical Exam     Last Recorded Vitals  Blood pressure 133/71, pulse 84, temperature 36.6 °C (97.9 °F), resp. rate 18, height 1.676 m (5' 6\"), weight 81.6 kg (180 lb), SpO2 98%.   "   Relevant Results                 Results for orders placed or performed during the hospital encounter of 04/10/24 (from the past 24 hour(s))   CBC and Auto Differential   Result Value Ref Range     WBC 12.3 (H) 4.4 - 11.3 x10*3/uL     nRBC 0.0 0.0 - 0.0 /100 WBCs     RBC 3.97 (L) 4.50 - 5.90 x10*6/uL     Hemoglobin 11.8 (L) 13.5 - 17.5 g/dL     Hematocrit 36.4 (L) 41.0 - 52.0 %     MCV 92 80 - 100 fL     MCH 29.7 26.0 - 34.0 pg     MCHC 32.4 32.0 - 36.0 g/dL     RDW 14.0 11.5 - 14.5 %     Platelets 270 150 - 450 x10*3/uL     Neutrophils % 80.6 40.0 - 80.0 %     Immature Granulocytes %, Automated 0.2 0.0 - 0.9 %     Lymphocytes % 12.0 13.0 - 44.0 %     Monocytes % 5.9 2.0 - 10.0 %     Eosinophils % 0.9 0.0 - 6.0 %     Basophils % 0.4 0.0 - 2.0 %     Neutrophils Absolute 9.88 (H) 1.60 - 5.50 x10*3/uL     Immature Granulocytes Absolute, Automated 0.03 0.00 - 0.50 x10*3/uL     Lymphocytes Absolute 1.47 0.80 - 3.00 x10*3/uL     Monocytes Absolute 0.73 0.05 - 0.80 x10*3/uL     Eosinophils Absolute 0.11 0.00 - 0.40 x10*3/uL     Basophils Absolute 0.05 0.00 - 0.10 x10*3/uL   Comprehensive metabolic panel   Result Value Ref Range     Glucose 179 (H) 74 - 99 mg/dL     Sodium 133 (L) 136 - 145 mmol/L     Potassium 4.7 3.5 - 5.3 mmol/L     Chloride 98 98 - 107 mmol/L     Bicarbonate 23 21 - 32 mmol/L     Anion Gap 17 10 - 20 mmol/L     Urea Nitrogen 16 6 - 23 mg/dL     Creatinine 1.17 0.50 - 1.30 mg/dL     eGFR 58 (L) >60 mL/min/1.73m*2     Calcium 9.5 8.6 - 10.3 mg/dL     Albumin 4.3 3.4 - 5.0 g/dL     Alkaline Phosphatase 86 33 - 136 U/L     Total Protein 7.0 6.4 - 8.2 g/dL     AST 23 9 - 39 U/L     Bilirubin, Total 1.5 (H) 0.0 - 1.2 mg/dL     ALT 12 10 - 52 U/L   aPTT   Result Value Ref Range     aPTT 30 27 - 38 seconds   Protime-INR   Result Value Ref Range     Protime 11.7 9.8 - 12.8 seconds     INR 1.0 0.9 - 1.1   Type and Screen   Result Value Ref Range     ABO TYPE O       Rh TYPE POS       ANTIBODY SCREEN NEG         XR femur right 2+ views     Result Date: 4/10/2024  STUDY: Femur Radiographs; 4/10/2024 6:12 AM INDICATION: Trauma. COMPARISON: None Available. ACCESSION NUMBER(S): [id number] ORDERING CLINICIAN: IRVING BECERRA TECHNIQUE:  Four view(s) of the right femur. FINDINGS:  Right-sided superior and inferior pubic rami and ischium normal. No fracture. The iliopectineal line on the right is normal. The femoral head is localized normally in the acetabular fossa. Femoral head appears to be normal. Femoral neck and intertrochanteric region appear to be normal. No fracture or trabecular line disruption. The femoral shaft appears to be normal. There are vessel calcifications identified. Small knee effusion suspected. Patellofemoral, medial and lateral compartment severe degenerative changes are identified. No definite evidence of fracture involving the distal femoral shaft or the proximal tibial plateau or proximal fibula in their limited visualized extent.     No fracture or subluxation of the right femur. Severe tricompartmental degenerative changes of the right knee. Small knee effusion suspected. Signed by Godwin Riggs MD     XR pelvis 1-2 views     Result Date: 4/10/2024  STUDY: Pelvis Radiographs; 4/10/2024 6:12 AM INDICATION: Trauma. COMPARISON: None Available. ACCESSION NUMBER(S): [id number] ORDERING CLINICIAN: IRVING BECERRA TECHNIQUE:  One view(s) of the pelvis. FINDINGS:  Lower lumbar spine degenerative changes. Iliac wings normal with esthesiopathic changes. SI joint show degenerative changes. Bilateral hip DJD.  Superior and inferior pubic rami normal. Both hips appear to be localized normally in the acetabulum. Intertrochanteric region normal. Femoral head normal. Femoral neck appears normal. No distinct evidence of fracture identified trabecular lines appear to be normal. Proximal femoral shafts appear normal. Visualized portions of the pelvis remarkable for phleboliths.     Degenerative changes of the  lower lumbar spine, hips  and SI joints. No evidence of fracture or subluxation. Signed by Godwin Riggs MD     XR chest 1 view     Result Date: 4/10/2024  STUDY: Chest Radiograph;  4/10/2024 6:12 AM INDICATION: Cough. COMPARISON: None Available. ACCESSION NUMBER(S): [id number] ORDERING CLINICIAN: IRVING BECERRA TECHNIQUE:  Frontal chest was obtained at 06:12 hours. FINDINGS: CARDIOMEDIASTINAL SILHOUETTE: Cardiomediastinal silhouette is normal in size and configuration. Calcified mediastinal lymph nodes. Airway normal.  LUNGS: Lungs are clear. Basilar atelectasis and scarring. No pneumothorax. No focal nodule  ABDOMEN: No remarkable upper abdominal findings. No free air.  BONES: No acute osseous changes. Degenerative changes of both shoulders and thoracic spine. No focal rib abnormality is identified.     Normal size heart. Evidence of prior granulomatous disease. Basilar atelectatic changes. No focal infiltrate. Signed by Godwin Riggs MD     CT head wo IV contrast     Result Date: 4/10/2024  Interpreted By:  Arnulfo Moncada, STUDY: CT HEAD WO IV CONTRAST; CT CERVICAL SPINE WO IV CONTRAST; ; 4/10/2024 6:00 am   INDICATION: Signs/Symptoms:trauma.   COMPARISON: None.   ACCESSION NUMBER(S): [id number]; [id number]   ORDERING CLINICIAN: IRVING BECERRA   TECHNIQUE: Noncontrast CT exams of the head and cervical spine with multiplanar reformations.   FINDINGS: BRAIN PARENCHYMA: Gray-white matter interfaces are preserved. 5 mm of leftward midline shift at the level of foramen of Monro.   HEMORRHAGE: There is an acute on chronic right frontoparietal subdural hematoma measuring up to 2.8 cm in maximal thickness along the anterolateral frontal convexity. VENTRICLES and EXTRA-AXIAL SPACES: There is cerebral sulcal effacement. Asymmetric narrowing of the right lateral ventricle and narrowing of the 3rd ventricle. No definite hydrocephalus. EXTRACRANIAL SOFT TISSUES: Small right posterior scalp hematoma. PARANASAL  SINUSES/MASTOIDS: The visualized paranasal sinuses and mastoid air cells are aerated. CALVARIUM: No depressed skull fracture. No destructive osseous lesion.   OTHER FINDINGS: None.   CERVICAL SPINE:   Mild reversal of the normal cervical lordosis could reflect positioning or muscle spasm. ALIGNMENT: Grade 1 degenerative anterolisthesis at C2-C3 and C3-C4. Trace degenerative anterolisthesis at C7-T1. Alignment is otherwise maintained. VERTEBRAE: No acute fracture is seen. Vertebral stature is maintained. Degenerative endplate osteophytosis and uncovertebral hypertrophy in conjunction with moderate to severe degenerative disc height loss, overall worst at C7-T1. Moderate to severe multilevel hypertrophic facet osteoarthropathy. SPINAL CANAL: No critical spinal canal stenosis. PREVERTEBRAL SOFT TISSUES: No prevertebral soft tissue swelling. LUNG APICES: Imaged portion of the lung apices are within normal limits.   OTHER FINDINGS: None.        There is an acute on chronic right frontoparietal subdural hematoma measuring up to 2.8 cm in maximal thickness along the anterolateral frontal convexity. There is cerebral sulcal effacement, narrowing of the right lateral and 3rd ventricles and 5 mm of leftward midline shift. Neurosurgical evaluation recommended.   No acute cervical spine fracture or malalignment. Severe spondylosis.     MACRO: Arnulfo Moncada discussed the significance and urgency of this critical finding by epic secure chat with  IRVING BECERRA on 4/10/2024 at 6:25 am.  (**-RCF-**) Findings:  See findings.     Signed by: Arnulfo Moncada 4/10/2024 6:25 AM Dictation workstation:   [id number]     CT cervical spine wo IV contrast     Result Date: 4/10/2024  Interpreted By:  Arnulfo Moncada, STUDY: CT HEAD WO IV CONTRAST; CT CERVICAL SPINE WO IV CONTRAST; ; 4/10/2024 6:00 am   INDICATION: Signs/Symptoms:trauma.   COMPARISON: None.   ACCESSION NUMBER(S): [id number]; [id number]   ORDERING CLINICIAN: IRVING BECERRA    TECHNIQUE: Noncontrast CT exams of the head and cervical spine with multiplanar reformations.   FINDINGS: BRAIN PARENCHYMA: Gray-white matter interfaces are preserved. 5 mm of leftward midline shift at the level of foramen of Monro.   HEMORRHAGE: There is an acute on chronic right frontoparietal subdural hematoma measuring up to 2.8 cm in maximal thickness along the anterolateral frontal convexity. VENTRICLES and EXTRA-AXIAL SPACES: There is cerebral sulcal effacement. Asymmetric narrowing of the right lateral ventricle and narrowing of the 3rd ventricle. No definite hydrocephalus. EXTRACRANIAL SOFT TISSUES: Small right posterior scalp hematoma. PARANASAL SINUSES/MASTOIDS: The visualized paranasal sinuses and mastoid air cells are aerated. CALVARIUM: No depressed skull fracture. No destructive osseous lesion.   OTHER FINDINGS: None.   CERVICAL SPINE:   Mild reversal of the normal cervical lordosis could reflect positioning or muscle spasm. ALIGNMENT: Grade 1 degenerative anterolisthesis at C2-C3 and C3-C4. Trace degenerative anterolisthesis at C7-T1. Alignment is otherwise maintained. VERTEBRAE: No acute fracture is seen. Vertebral stature is maintained. Degenerative endplate osteophytosis and uncovertebral hypertrophy in conjunction with moderate to severe degenerative disc height loss, overall worst at C7-T1. Moderate to severe multilevel hypertrophic facet osteoarthropathy. SPINAL CANAL: No critical spinal canal stenosis. PREVERTEBRAL SOFT TISSUES: No prevertebral soft tissue swelling. LUNG APICES: Imaged portion of the lung apices are within normal limits.   OTHER FINDINGS: None.        There is an acute on chronic right frontoparietal subdural hematoma measuring up to 2.8 cm in maximal thickness along the anterolateral frontal convexity. There is cerebral sulcal effacement, narrowing of the right lateral and 3rd ventricles and 5 mm of leftward midline shift. Neurosurgical evaluation recommended.   No acute  cervical spine fracture or malalignment. Severe spondylosis.     MACRO: Arnulfo Moncada discussed the significance and urgency of this critical finding by epic secure chat with  IRVING BECERRA on 4/10/2024 at 6:25 am.  (**-RCF-**) Findings:  See findings.     Signed by: Arnulfo Moncada 4/10/2024 6:25 AM Dictation workstation:   [id number]            Assessment/Plan   Principal Problem:    SDH (subdural hematoma) (CMS/HCC)     Roman Lakhani is a 89 y.o. [place] speaking male with past medical history significant for dementia, CKD, DM, COPD w/ chronic resp failure (O2 dependent), paranoid schizophrenia, cardiomyopathy, anxiety/depression, HTN, HLD, BPH, and GERD who presented to [facility] ED 4/10 from [facility] s/p unwitnessed fall. Patient pan scanned with the following significant results: acute on chronic right frontoparietal SDH measuring up to 2.8cm as well as cerebral sulcal effacement, narrowing of the right lateral and 3rd ventricles and 5mm leftward midline shift. Patient admitted to trauma service with consult to NSGY for continued management.      List of clinically significant injuries/problems:  Acute on chronic 2.8 cm SDH with 5 mm leftward midline shift  Small right knee effusion with severe tricompartmental degenerative changes  Leukocytosis  Hyperglycemia  Bed bugs     Assessment/Plan:     # SDH  - NSGY consulted, appreciate input  - pending repeat head CT at 1230     -> NPO pending stability of repeat CT  - maintain HOB >30 degrees  - maintain SBP <160  - Q4H neurochecks  - avoid narcotics if possible given hx dementia with TBI d/t concern for development of delirium  - acetaminophen prn for pain  - PT/OT     # Leukocytosis  - likely reactive  - if uptrending will look for additional causes  - am labs     # Dementia  # DM  # CKD  # COPD  # Chronic resp failure (O2 dependent)  # HTN  # HLD  - medicine consulted  - ISS while inpatient  - restart home meds when med rec complete and medically  appropriate      # Bed bugs  - contact precautions     # DVT ppx  - hold until cleared by NSGY  - SCD's     Plan not finalized until discussed with Dr. Pickens                 I spent 60 minutes in the professional and overall care of this patient.                                  Cosigned by:      Revision History    Patient fully evaluated on April 10.  Patient more awake and alert this afternoon was minimally responsive earlier.  Patient has a rash over his forearms consistent with eczema.  Questionable bedbug was found in the emergency room and awaiting pathologic evaluation.  I had fully examined the patient and found no further bedbugs at this time.  Recommend triamcinolone cream twice daily to areas of eczema.  Recheck labs in AM.  Further neurochecks overnight.      Timo Miranda MD

## 2024-04-10 NOTE — CARE PLAN
The patient's goals for the shift include  no increase in neurological decline    The clinical goals for the shift include  no increase in neurological decline

## 2024-04-10 NOTE — ED PROVIDER NOTES
HPI   Chief Complaint   Patient presents with    Fall     Pt arrived to ed via ems from nursing facility. Per ems pt had a unwitnessed fall. Pt is alert and oriented x 1-2. Pt localizing pain in right lower extremity.        Patient is a 89-year-old male from skilled nursing facility that is non-English speaking with a history of dementia, CHF, diabetes, COPD who had an unwitnessed fall.  He is DNR Comfort Care arrest.  It appears that he has right femur pain.  History and exam was done through a [place] speaking .  History and exam is limited secondary to patient's dementia as well.                          [place] Coma Scale Score: 13                     Patient History   No past medical history on file.  No past surgical history on file.  No family history on file.  Social History     Tobacco Use    Smoking status: Not on file    Smokeless tobacco: Not on file   Substance Use Topics    Alcohol use: Not on file    Drug use: Not on file       Physical Exam   ED Triage Vitals [04/10/24 0452]   Temperature Heart Rate Respirations BP   36.6 °C (97.9 °F) 84 18 133/71      Pulse Ox Temp src Heart Rate Source Patient Position   98 % -- Monitor Lying      BP Location FiO2 (%)     Left arm --       Physical Exam  Vitals and nursing note reviewed.   Constitutional:       General: He is not in acute distress.     Appearance: He is well-developed.   HENT:      Head: Normocephalic and atraumatic.   Eyes:      Conjunctiva/sclera: Conjunctivae normal.   Cardiovascular:      Rate and Rhythm: Normal rate and regular rhythm.      Heart sounds: No murmur heard.  Pulmonary:      Effort: Pulmonary effort is normal. No respiratory distress.      Breath sounds: Normal breath sounds.   Abdominal:      Palpations: Abdomen is soft.      Tenderness: There is no abdominal tenderness.   Musculoskeletal:         General: No swelling.      Cervical back: Neck supple. No tenderness.      Comments: Tenderness palpation proximal femur.   Appears to have mild pain with movement of the right hip.  No right knee or ankle pain.  He examination including palpation and range of motion of 3 other extremities does not elicit any pain.   Skin:     General: Skin is warm and dry.      Capillary Refill: Capillary refill takes less than 2 seconds.   Neurological:      Mental Status: He is alert.   Psychiatric:         Mood and Affect: Mood normal.         ED Course & MDM   Diagnoses as of 04/10/24 2107   SDH (subdural hematoma) (CMS/[facility])       Medical Decision Making  Differential diagnosis intracranial hemorrhage, hip fracture, pelvic fracture, etc.    CT head shows a subdural hematoma with compression of the lateral ventricle and 5 mm midline shift.  Discussed case with Dr. Cid who stated that this is nonoperative and stated patient needs repeat 6-hour CT head and admit to stepdown.  Spoke with granddaughter as well.  Spoke with on-call medical doctor as well as trauma surgery.  X-rays of the hip, femur and pelvis were unremarkable.  CT cervical spine was negative.  Patient was admitted to the hospital.        Procedure  Critical Care    Performed by: Timo Henning MD  Authorized by: Timo Henning MD    Critical care provider statement:     Critical care time (minutes):  32    Critical care time was exclusive of:  Separately billable procedures and treating other patients    Critical care was necessary to treat or prevent imminent or life-threatening deterioration of the following conditions:  Trauma    Critical care was time spent personally by me on the following activities:  Ordering and performing treatments and interventions, ordering and review of laboratory studies, ordering and review of radiographic studies, pulse oximetry, re-evaluation of patient's condition and review of old charts    Care discussed with: admitting provider         Timo Henning MD  04/10/24 2110

## 2024-04-10 NOTE — PROGRESS NOTES
Physical Therapy                 Therapy Communication Note    Patient Name: Roman Lakhani  MRN: [id number]  Today's Date: 4/10/2024     Discipline: Physical Therapy    Missed Visit Reason: Missed Visit Reason:  (Acute on chronic right frontoparietal subdural hematoma, PT eval deferred await neurosurgery consult)

## 2024-04-11 ENCOUNTER — APPOINTMENT (OUTPATIENT)
Dept: RADIOLOGY | Facility: HOSPITAL | Age: 89
DRG: 082 | End: 2024-04-11
Payer: COMMERCIAL

## 2024-04-11 LAB
ALBUMIN SERPL BCP-MCNC: 3.7 G/DL (ref 3.4–5)
ALP SERPL-CCNC: 77 U/L (ref 33–136)
ALT SERPL W P-5'-P-CCNC: 16 U/L (ref 10–52)
ANION GAP SERPL CALC-SCNC: 12 MMOL/L (ref 10–20)
AST SERPL W P-5'-P-CCNC: 49 U/L (ref 9–39)
BILIRUB SERPL-MCNC: 1.5 MG/DL (ref 0–1.2)
BUN SERPL-MCNC: 18 MG/DL (ref 6–23)
CALCIUM SERPL-MCNC: 8.8 MG/DL (ref 8.6–10.3)
CHLORIDE SERPL-SCNC: 101 MMOL/L (ref 98–107)
CO2 SERPL-SCNC: 25 MMOL/L (ref 21–32)
CREAT SERPL-MCNC: 1.09 MG/DL (ref 0.5–1.3)
EGFRCR SERPLBLD CKD-EPI 2021: 64 ML/MIN/1.73M*2
ERYTHROCYTE [DISTWIDTH] IN BLOOD BY AUTOMATED COUNT: 14.1 % (ref 11.5–14.5)
GLUCOSE BLD MANUAL STRIP-MCNC: 118 MG/DL (ref 74–99)
GLUCOSE BLD MANUAL STRIP-MCNC: 118 MG/DL (ref 74–99)
GLUCOSE BLD MANUAL STRIP-MCNC: 191 MG/DL (ref 74–99)
GLUCOSE BLD MANUAL STRIP-MCNC: 203 MG/DL (ref 74–99)
GLUCOSE SERPL-MCNC: 125 MG/DL (ref 74–99)
HCT VFR BLD AUTO: 35.8 % (ref 41–52)
HGB BLD-MCNC: 11.4 G/DL (ref 13.5–17.5)
HOLD SPECIMEN: NORMAL
MCH RBC QN AUTO: 29 PG (ref 26–34)
MCHC RBC AUTO-ENTMCNC: 31.8 G/DL (ref 32–36)
MCV RBC AUTO: 91 FL (ref 80–100)
NRBC BLD-RTO: 0 /100 WBCS (ref 0–0)
PLATELET # BLD AUTO: 258 X10*3/UL (ref 150–450)
POTASSIUM SERPL-SCNC: 4.1 MMOL/L (ref 3.5–5.3)
PROT SERPL-MCNC: 6.1 G/DL (ref 6.4–8.2)
RBC # BLD AUTO: 3.93 X10*6/UL (ref 4.5–5.9)
SODIUM SERPL-SCNC: 134 MMOL/L (ref 136–145)
WBC # BLD AUTO: 9 X10*3/UL (ref 4.4–11.3)

## 2024-04-11 PROCEDURE — 84075 ASSAY ALKALINE PHOSPHATASE: CPT | Performed by: INTERNAL MEDICINE

## 2024-04-11 PROCEDURE — 73030 X-RAY EXAM OF SHOULDER: CPT | Mod: LT

## 2024-04-11 PROCEDURE — 2060000001 HC INTERMEDIATE ICU ROOM DAILY

## 2024-04-11 PROCEDURE — 73030 X-RAY EXAM OF SHOULDER: CPT | Mod: LEFT SIDE | Performed by: STUDENT IN AN ORGANIZED HEALTH CARE EDUCATION/TRAINING PROGRAM

## 2024-04-11 PROCEDURE — 85027 COMPLETE CBC AUTOMATED: CPT | Performed by: NURSE PRACTITIONER

## 2024-04-11 PROCEDURE — 70450 CT HEAD/BRAIN W/O DYE: CPT | Performed by: RADIOLOGY

## 2024-04-11 PROCEDURE — 2500000001 HC RX 250 WO HCPCS SELF ADMINISTERED DRUGS (ALT 637 FOR MEDICARE OP): Performed by: NURSE PRACTITIONER

## 2024-04-11 PROCEDURE — 99222 1ST HOSP IP/OBS MODERATE 55: CPT | Performed by: NURSE PRACTITIONER

## 2024-04-11 PROCEDURE — 2500000002 HC RX 250 W HCPCS SELF ADMINISTERED DRUGS (ALT 637 FOR MEDICARE OP, ALT 636 FOR OP/ED): Performed by: NURSE PRACTITIONER

## 2024-04-11 PROCEDURE — 82947 ASSAY GLUCOSE BLOOD QUANT: CPT

## 2024-04-11 PROCEDURE — 70450 CT HEAD/BRAIN W/O DYE: CPT

## 2024-04-11 PROCEDURE — 36415 COLL VENOUS BLD VENIPUNCTURE: CPT | Performed by: NURSE PRACTITIONER

## 2024-04-11 PROCEDURE — 99232 SBSQ HOSP IP/OBS MODERATE 35: CPT | Performed by: REGISTERED NURSE

## 2024-04-11 RX ORDER — ACETAMINOPHEN 325 MG/1
650 TABLET ORAL EVERY 4 HOURS PRN
Status: DISCONTINUED | OUTPATIENT
Start: 2024-04-11 | End: 2024-04-20 | Stop reason: HOSPADM

## 2024-04-11 RX ADMIN — TRIAMCINOLONE ACETONIDE: 1 OINTMENT TOPICAL at 11:09

## 2024-04-11 RX ADMIN — TRIAMCINOLONE ACETONIDE: 1 OINTMENT TOPICAL at 21:44

## 2024-04-11 RX ADMIN — DONEPEZIL HYDROCHLORIDE 10 MG: 10 TABLET ORAL at 21:41

## 2024-04-11 RX ADMIN — ACETAMINOPHEN 650 MG: 325 TABLET ORAL at 06:22

## 2024-04-11 RX ADMIN — INSULIN HUMAN 4 UNITS: 100 INJECTION, SOLUTION PARENTERAL at 17:23

## 2024-04-11 RX ADMIN — LEVOTHYROXINE SODIUM 150 MCG: 150 TABLET ORAL at 06:22

## 2024-04-11 RX ADMIN — ACETAMINOPHEN 650 MG: 325 TABLET ORAL at 01:23

## 2024-04-11 RX ADMIN — PANTOPRAZOLE SODIUM 20 MG: 20 TABLET, DELAYED RELEASE ORAL at 06:22

## 2024-04-11 RX ADMIN — INSULIN GLARGINE 17 UNITS: 100 INJECTION, SOLUTION SUBCUTANEOUS at 17:23

## 2024-04-11 ASSESSMENT — COGNITIVE AND FUNCTIONAL STATUS - GENERAL
TURNING FROM BACK TO SIDE WHILE IN FLAT BAD: A LOT
DAILY ACTIVITIY SCORE: 12
EATING MEALS: A LOT
PERSONAL GROOMING: A LOT
DRESSING REGULAR LOWER BODY CLOTHING: A LOT
CLIMB 3 TO 5 STEPS WITH RAILING: A LOT
HELP NEEDED FOR BATHING: A LOT
MOVING TO AND FROM BED TO CHAIR: A LOT
TOILETING: A LOT
STANDING UP FROM CHAIR USING ARMS: A LOT
TURNING FROM BACK TO SIDE WHILE IN FLAT BAD: A LOT
CLIMB 3 TO 5 STEPS WITH RAILING: A LOT
MOVING FROM LYING ON BACK TO SITTING ON SIDE OF FLAT BED WITH BEDRAILS: A LOT
MOBILITY SCORE: 12
DRESSING REGULAR LOWER BODY CLOTHING: A LOT
CLIMB 3 TO 5 STEPS WITH RAILING: A LOT
DAILY ACTIVITIY SCORE: 12
MOVING FROM LYING ON BACK TO SITTING ON SIDE OF FLAT BED WITH BEDRAILS: A LITTLE
EATING MEALS: A LOT
WALKING IN HOSPITAL ROOM: A LOT
STANDING UP FROM CHAIR USING ARMS: A LOT
PATIENT BASELINE BEDBOUND: NO
HELP NEEDED FOR BATHING: A LOT
HELP NEEDED FOR BATHING: A LOT
PERSONAL GROOMING: A LOT
DRESSING REGULAR UPPER BODY CLOTHING: A LOT
DRESSING REGULAR UPPER BODY CLOTHING: A LOT
PERSONAL GROOMING: A LOT
MOBILITY SCORE: 13
TOILETING: A LOT
STANDING UP FROM CHAIR USING ARMS: A LOT
MOVING TO AND FROM BED TO CHAIR: A LOT
DAILY ACTIVITIY SCORE: 12
DRESSING REGULAR UPPER BODY CLOTHING: A LITTLE
WALKING IN HOSPITAL ROOM: A LOT
TOILETING: A LOT
TURNING FROM BACK TO SIDE WHILE IN FLAT BAD: A LOT
EATING MEALS: A LOT
WALKING IN HOSPITAL ROOM: A LOT
MOBILITY SCORE: 14
DRESSING REGULAR LOWER BODY CLOTHING: TOTAL
MOVING TO AND FROM BED TO CHAIR: A LOT

## 2024-04-11 ASSESSMENT — PAIN SCALES - GENERAL
PAINLEVEL_OUTOF10: 4
PAINLEVEL_OUTOF10: 0 - NO PAIN

## 2024-04-11 ASSESSMENT — ACTIVITIES OF DAILY LIVING (ADL)
GROOMING: DEPENDENT
TOILETING: DEPENDENT
HEARING - LEFT EAR: FUNCTIONAL
BATHING: DEPENDENT
HEARING - RIGHT EAR: FUNCTIONAL
ADEQUATE_TO_COMPLETE_ADL: YES
LACK_OF_TRANSPORTATION: PATIENT DECLINED
DRESSING YOURSELF: DEPENDENT
PATIENT'S MEMORY ADEQUATE TO SAFELY COMPLETE DAILY ACTIVITIES?: NO
WALKS IN HOME: DEPENDENT
JUDGMENT_ADEQUATE_SAFELY_COMPLETE_DAILY_ACTIVITIES: NO
FEEDING YOURSELF: DEPENDENT

## 2024-04-11 ASSESSMENT — PAIN SCALES - WONG BAKER
WONGBAKER_NUMERICALRESPONSE: HURTS LITTLE BIT
WONGBAKER_NUMERICALRESPONSE: HURTS LITTLE BIT

## 2024-04-11 ASSESSMENT — PAIN - FUNCTIONAL ASSESSMENT
PAIN_FUNCTIONAL_ASSESSMENT: 0-10
PAIN_FUNCTIONAL_ASSESSMENT: WONG-BAKER FACES

## 2024-04-11 NOTE — CARE PLAN
The patient's goals for the shift include      The clinical goals for the shift include Pt will refrain from itching skin until next application of cream      Problem: Pain  Goal: My pain/discomfort is manageable  Outcome: Progressing     Problem: Safety  Goal: Patient will be injury free during hospitalization  Outcome: Progressing  Goal: I will remain free of falls  Outcome: Progressing     Problem: Daily Care  Goal: Daily care needs are met  Outcome: Progressing     Problem: Psychosocial Needs  Goal: Demonstrates ability to cope with hospitalization/illness  Outcome: Progressing  Goal: Collaborate with me, my family, and caregiver to identify my specific goals  Outcome: Progressing     Problem: Discharge Barriers  Goal: My discharge needs are met  Outcome: Progressing     Problem: Skin  Goal: Participates in plan/prevention/treatment measures  Outcome: Progressing  Goal: Prevent/manage excess moisture  Outcome: Progressing  Goal: Promote skin healing  Outcome: Progressing

## 2024-04-11 NOTE — SIGNIFICANT EVENT
"Discussed head CT results with Neurosurgery YUE and subsequently went to bedside to discuss results with family.  Family stated that at this time they do not want his official code status changed; however, they would be accepting of surgery, aware that he is quite high risk.  Per family, they believe the patient's mentation to be close to his baseline. He is able to recognize family members and speak appropriately to them. He needed reminding that he was in the hospital and at baseline he is never oriented to time.  His EOMs are still intact. He denies headache or vision changes.  All extremities with normal movement EXCEPT LUE.  Still with flaccid LUE, but gross movement at shoulder maintained. Sensation intact.     Despite HOB elevation and strict BP monitoring, patient does have increasing midline shift on repeat imaging. Reached out to on-call Neurosurgery attending, but awaiting reply.  Initiated Transfer center referral for higher level of care; however, there are not currently any available beds and we are awaiting the NSGY attending from [facility] to be available for phone call.   In meantime, Xray of left shoulder ordered to rule out any acute traumatic injury that would cause LUE weakness/loss of movement. Also ordered repeat Head CT (4h) to ensure no worsening shift.  Neuro checks changed to q1h    Addendum:  Received update from on-call neurosurgery attending Dr. Cid: \"I reviewed chart and discussed with attending neurosurgeon [place]. Due to the patients age and comorbidities, he is not a candidate for surgery. The risk of surgery is exceedingly high, and the risk of subdural re-accumulation also makes surgery futile\"    The decision of the neurosurgeon was discussed with family at bedside who verbalized understanding. Other than LUE weakness, patient's exam is relatively unchanged from his baseline. Will continue medical interventions (HOB elevation, BP control, avoidance of anticoagulation). " Will not pursue additional radiographic imaging. Will change Neuro checks back to Q4h. Will consult Palliative care for symptom management and for the possibility that patient's condition could worsen if midline shift continues to increase.

## 2024-04-11 NOTE — CONSULTS
"Reason For Consult  SDH    History Of Present Illness  Roman Lakhani is a 89 y.o. male presenting via EMS from [facility] after unwitnessed fall. He speaks minimal English. CT Head images x 2 reviewed: large right convexity mixed density SDH with significant sulcal effacement, midline shift of ~ 5 mm; appears stable on repeat CT, although limited by movement. Currently, does not appear in distress. When asked if he has a headache (in Mauritian), he states \"si\", but then answers \"si\" to all questions.      Past Medical History  HTN / HPL, cardiomyopathy, CHF, GERD, DM2, BPH, anxiety / depression, schizophrenia, dementia     Surgical History  He has no past surgical history on file.     Social History  He has no history on file for tobacco use, alcohol use, and drug use.    Family History  No family history on file.     Allergies  Patient has no known allergies.    Review of Systems  Unable to assess ROS due to dementia and language barrier     Physical Exam  Well nourished, well developed male, resting in bed  A&O to self, does not follow verbal or demonstrated commands  Left upper extremity is flaccid, but has sensory intact.  Skin is warm / dry, multiple small lesions on distal BUE  Thorax is midline; chest expansion is symmetric  Abdomen is not distended  Urine - incontinent of urine prior to exam     Last Recorded Vitals  Blood pressure 145/65, pulse 90, temperature 36.3 °C (97.3 °F), resp. rate 18, height 1.676 m (5' 6\"), weight 81.6 kg (180 lb), SpO2 94%.    Relevant Results  Results for orders placed or performed during the hospital encounter of 04/10/24 (from the past 24 hour(s))   CBC and Auto Differential   Result Value Ref Range    WBC 12.3 (H) 4.4 - 11.3 x10*3/uL    nRBC 0.0 0.0 - 0.0 /100 WBCs    RBC 3.97 (L) 4.50 - 5.90 x10*6/uL    Hemoglobin 11.8 (L) 13.5 - 17.5 g/dL    Hematocrit 36.4 (L) 41.0 - 52.0 %    MCV 92 80 - 100 fL    MCH 29.7 26.0 - 34.0 pg    MCHC 32.4 32.0 - 36.0 g/dL    RDW 14.0 11.5 - 14.5 " %    Platelets 270 150 - 450 x10*3/uL    Neutrophils % 80.6 40.0 - 80.0 %    Immature Granulocytes %, Automated 0.2 0.0 - 0.9 %    Lymphocytes % 12.0 13.0 - 44.0 %    Monocytes % 5.9 2.0 - 10.0 %    Eosinophils % 0.9 0.0 - 6.0 %    Basophils % 0.4 0.0 - 2.0 %    Neutrophils Absolute 9.88 (H) 1.60 - 5.50 x10*3/uL    Immature Granulocytes Absolute, Automated 0.03 0.00 - 0.50 x10*3/uL    Lymphocytes Absolute 1.47 0.80 - 3.00 x10*3/uL    Monocytes Absolute 0.73 0.05 - 0.80 x10*3/uL    Eosinophils Absolute 0.11 0.00 - 0.40 x10*3/uL    Basophils Absolute 0.05 0.00 - 0.10 x10*3/uL   Comprehensive metabolic panel   Result Value Ref Range    Glucose 179 (H) 74 - 99 mg/dL    Sodium 133 (L) 136 - 145 mmol/L    Potassium 4.7 3.5 - 5.3 mmol/L    Chloride 98 98 - 107 mmol/L    Bicarbonate 23 21 - 32 mmol/L    Anion Gap 17 10 - 20 mmol/L    Urea Nitrogen 16 6 - 23 mg/dL    Creatinine 1.17 0.50 - 1.30 mg/dL    eGFR 58 (L) >60 mL/min/1.73m*2    Calcium 9.5 8.6 - 10.3 mg/dL    Albumin 4.3 3.4 - 5.0 g/dL    Alkaline Phosphatase 86 33 - 136 U/L    Total Protein 7.0 6.4 - 8.2 g/dL    AST 23 9 - 39 U/L    Bilirubin, Total 1.5 (H) 0.0 - 1.2 mg/dL    ALT 12 10 - 52 U/L   aPTT   Result Value Ref Range    aPTT 30 27 - 38 seconds   Protime-INR   Result Value Ref Range    Protime 11.7 9.8 - 12.8 seconds    INR 1.0 0.9 - 1.1   Type and Screen   Result Value Ref Range    ABO TYPE O     Rh TYPE POS     ANTIBODY SCREEN NEG    POCT GLUCOSE   Result Value Ref Range    POCT Glucose 138 (H) 74 - 99 mg/dL   POCT GLUCOSE   Result Value Ref Range    POCT Glucose 142 (H) 74 - 99 mg/dL   POCT GLUCOSE   Result Value Ref Range    POCT Glucose 151 (H) 74 - 99 mg/dL          Assessment/Plan   89 yo male with acute on chronic SDH, significant LUE weakness (per RN, weakness was present at shift change last evening)  Discussed with Dr. Jayjay Cid:  - Discussed with patient's daughter, Madiha Paris ([phone]) who states he was moving his LUE  yesterday, but was complaining about the arm. Discussed option for surgical intervention vs continuation of DNR. Madiha is unable to decide at the time of phone call. Repeat CT Head to assess stability of SDH. She will make a decision regarding surgery (patient is high risk given age and co-morbidities), vs hospice / continuation of DNR once she arrives.  - Maintain HOB > 30 degrees, SBP < 160, avoid anticoagulation      I spent > 60 minutes in the professional and overall care of this patient.      Claudine Dillard, APRN-CNP

## 2024-04-11 NOTE — PROGRESS NOTES
Met with family at bedside. They expressed desire for patient to not return to [facility]. LTC list provided to family. Awaiting choices.

## 2024-04-11 NOTE — POST-PROCEDURE NOTE
Repeat CT Head images reviewed. Midline shift is now 8 mm (from 5 mm on prior CT). LUE weakness is new since 04/11/2024. Awaiting family decision regarding treat despite high risk patient vs no treatment   JETT Mejia-CNP  Neurosurgery

## 2024-04-11 NOTE — PROGRESS NOTES
"Physical Therapy                 Therapy Communication Note    Patient Name: Roman Lakhani  MRN: [id number]  Today's Date: 4/11/2024     Discipline: Physical Therapy    Missed Visit Reason: Patient placed on medical hold (Per recent Trauma note: \"Despite HOB elevation and strict BP monitoring, patient does have increasing midline shift on repeat imaging.\" Pt on hold for PT eval at this time until medically stable.)    Missed Time: Attempt      "

## 2024-04-11 NOTE — PROGRESS NOTES
24 1537   Discharge Planning   Living Arrangements Other (Comment)  ([place])   Support Systems Spouse/significant other;Children   Assistance Needed ADL's   Type of Residence Nursing home/residential care   Who is requesting discharge planning? Provider   Home or Post Acute Services Post acute facilities (Rehab/SNF/etc)   Patient expects to be discharged to: LTC   Does the patient need discharge transport arranged? Yes   RoundTrip coordination needed? Yes   Has discharge transport been arranged? No     Met with patient and family at bedside. Introduced self and role as care coordinator. Name, , Address, and Insurance verified. Patient needs assistance with ADL's. Patient is primarily Nepali speaking and needs a . List of LTC facilities provided to family. Care coordinator will follow for discharge planning.

## 2024-04-11 NOTE — PROGRESS NOTES
"Roman Lakhani is a 89 y.o. male on day 1 of admission presenting with SDH (subdural hematoma) (CMS/HCC).    Subjective    Patient NOT moving LUE; this is new compared to yesterday. Despite q4h neuro checks ordered overnight, last documented neuro exam from nursing was 2355. LUE at that time was noted to \"respond to command\", but the LUE sensation was noted to be \"pain\".     Due to language barrier, orientation assessment was challenging. Even with use of translation valerie and a [place] speaking provider, the patient remained confused. Denied any pain. Said he was \"okay\". Moving RUE and BLE spontaneously.     Vitals stable overnight. Patient sitting upright over 45 degree angle this morning. STAT Head CT ordered due to neuro changes and Neurosurgery VALERIE notified/aware of changes. She spoke with family who is now considering rescinding DNR       Objective     Physical Exam  Constitutional:       General: He is not in acute distress.     Comments: Stays awake and carries on conversation in [place], although is confused. Drowsy when not being spoken to   HENT:      Head: Normocephalic and atraumatic.      Nose: Nose normal.      Mouth/Throat:      Mouth: Mucous membranes are moist.   Eyes:      Conjunctiva/sclera: Conjunctivae normal.   Cardiovascular:      Rate and Rhythm: Normal rate and regular rhythm.      Pulses: Normal pulses.      Comments: NSR 78  Pulmonary:      Effort: Pulmonary effort is normal. No respiratory distress.      Breath sounds: Rhonchi (Scattered) present.      Comments: Room air  Abdominal:      General: Bowel sounds are normal. There is no distension.      Palpations: Abdomen is soft.      Tenderness: There is no abdominal tenderness.   Genitourinary:     Penis: Normal.       Comments: External male catheter in place- no urine noticed in canister or tubing   Musculoskeletal:      Right lower leg: No edema.      Left lower leg: No edema.   Skin:     General: Skin is warm.      Coloration: " "Skin is not jaundiced.      Comments: Scabbed areas / rash mainly RUE- scattered left arm and BLE   Neurological:      Mental Status: He is alert. He is disoriented and confused.      GCS: GCS eye subscore is 4. GCS verbal subscore is 4. GCS motor subscore is 5.      Cranial Nerves: No facial asymmetry.      Sensory: Sensation is intact.      Motor: Abnormal muscle tone (LUE) present.      Comments: LUE flaccid with passive ROM. With noxious stimuli, did have gross shoulder movement, but no movement of arm/lower arm or hand         Last Recorded Vitals  Blood pressure 128/59, pulse 71, temperature 35.6 °C (96.1 °F), resp. rate 18, height 1.676 m (5' 6\"), weight 81.6 kg (180 lb), SpO2 96%.  Intake/Output last 3 Shifts:  I/O last 3 completed shifts:  In: 200 (2.4 mL/kg) [P.O.:200]  Out: 100 (1.2 mL/kg) [Urine:100 (0 mL/kg/hr)]  Weight: 81.6 kg     Relevant Results  Results for orders placed or performed during the hospital encounter of 04/10/24 (from the past 24 hour(s))   POCT GLUCOSE   Result Value Ref Range    POCT Glucose 138 (H) 74 - 99 mg/dL   POCT GLUCOSE   Result Value Ref Range    POCT Glucose 142 (H) 74 - 99 mg/dL   POCT GLUCOSE   Result Value Ref Range    POCT Glucose 151 (H) 74 - 99 mg/dL   Comprehensive Metabolic Panel   Result Value Ref Range    Glucose 125 (H) 74 - 99 mg/dL    Sodium 134 (L) 136 - 145 mmol/L    Potassium 4.1 3.5 - 5.3 mmol/L    Chloride 101 98 - 107 mmol/L    Bicarbonate 25 21 - 32 mmol/L    Anion Gap 12 10 - 20 mmol/L    Urea Nitrogen 18 6 - 23 mg/dL    Creatinine 1.09 0.50 - 1.30 mg/dL    eGFR 64 >60 mL/min/1.73m*2    Calcium 8.8 8.6 - 10.3 mg/dL    Albumin 3.7 3.4 - 5.0 g/dL    Alkaline Phosphatase 77 33 - 136 U/L    Total Protein 6.1 (L) 6.4 - 8.2 g/dL    AST 49 (H) 9 - 39 U/L    Bilirubin, Total 1.5 (H) 0.0 - 1.2 mg/dL    ALT 16 10 - 52 U/L   CBC   Result Value Ref Range    WBC 9.0 4.4 - 11.3 x10*3/uL    nRBC 0.0 0.0 - 0.0 /100 WBCs    RBC 3.93 (L) 4.50 - 5.90 x10*6/uL    " Hemoglobin 11.4 (L) 13.5 - 17.5 g/dL    Hematocrit 35.8 (L) 41.0 - 52.0 %    MCV 91 80 - 100 fL    MCH 29.0 26.0 - 34.0 pg    MCHC 31.8 (L) 32.0 - 36.0 g/dL    RDW 14.1 11.5 - 14.5 %    Platelets 258 150 - 450 x10*3/uL   POCT GLUCOSE   Result Value Ref Range    POCT Glucose 118 (H) 74 - 99 mg/dL   SST TOP   Result Value Ref Range    Extra Tube Hold for add-ons.            Assessment/Plan   Principal Problem:    SDH (subdural hematoma) (CMS/HCC)    Roman Lakhani is a 89 y.o. Bulgarian speaking male with a past medical history significant for dementia, CKD, IDDM Type 2, COPD w/ chronic respiratory failure (O2 dependent), paranoid schizophrenia, cardiomyopathy, anxiety/depression, HTN, HLD, BPH, and GERD who presented to [facility] ED 4/10 from [facility] s/p unwitnessed fall. Patient pan scanned with the following significant results: acute on chronic right frontoparietal SDH measuring up to 2.8cm as well as cerebral sulcal effacement, narrowing of the right lateral and 3rd ventricles and 5mm leftward midline shift. Patient admitted to trauma service with consult to NSGY for continued management.      List of clinically significant injuries/problems:  Acute on chronic 2.8 cm SDH with 5 mm leftward midline shift  Small right knee effusion with severe tricompartmental degenerative changes  Leukocytosis - resolved  Hyperglycemia - resolved   Bed bugs (Confirmed in ED)     Assessment/Plan:     # SDH  # Flaccid LUE - acute neuro change  - NSGY consulted, appreciate input  - STAT Repeat head CT to assess stability of SDH   > Q2h neuro checks until repeat CT Head   > Hold patient's breakfast until repeat CT Head  - maintain HOB >30 degrees  - maintain SBP <160  - NO anticoagulation or antiplatelet medications. Avoid NSAIDs  - avoid narcotics if possible given hx dementia with TBI d/t concern for development of delirium  - acetaminophen PRN for pain  - PT/OT     # Dementia  # IDDM Type 2  # CKD  # COPD  #  Chronic resp failure (O2 dependent)  # HTN  # HLD  - medicine consulted (Dr. Miranda)   - ISS while inpatient  - continue home meds as appropriate      # Bed bugs  - contact precautions     # DVT ppx  - SCD's only    Dispo:  NOT medically appropriate for hospital discharge. Code Status: DNR - family re-considering if they would accept surgical intervention if indicated for SDH. If family changes code status and pursues surgery, patient would need tx to [facility].      The patient was discussed with the consulting team (Neurosurgery YUE). The patient will be discussed with the on-call Trauma attending     I spent 25 minutes in the professional and overall care of this patient.      Kathie Smith, APRN-CNP

## 2024-04-11 NOTE — PROGRESS NOTES
"             Therapy Communication Note    Patient Name: Roman Lakhani  MRN: [id number]  Today's Date: 4/11/2024     Discipline: Occupational Therapy    Missed Visit Reason: Missed Visit Reason: Patient placed on medical hold (Per recent Trauma note: \"Despite HOB elevation and strict BP monitoring, patient does have increasing midline shift on repeat imaging.\" Pt on hold for PT eval at this time until medically stable.)    Missed Time: Attempt    Comment:Occupational Therapy      "

## 2024-04-11 NOTE — PROGRESS NOTES
Roman Lakhani is a 89 y.o. male on day 1 of admission presenting with SDH (subdural hematoma) (CMS/HCC).      Subjective   Patient fully evaluated on April 11.  Clinically unchanged.  Subdural hematoma results noted and no surgical intervention planned for now.  Continue supportive care.       Objective     Last Recorded Vitals  /71   Pulse 79   Temp 36.1 °C (97 °F) (Temporal)   Resp 18   Wt 81.6 kg (180 lb)   SpO2 96%   Intake/Output last 3 Shifts:    Intake/Output Summary (Last 24 hours) at 4/11/2024 1632  Last data filed at 4/11/2024 0600  Gross per 24 hour   Intake 200 ml   Output 100 ml   Net 100 ml       Admission Weight  Weight: 81.6 kg (180 lb) (04/10/24 0452)    Daily Weight  04/10/24 : 81.6 kg (180 lb)    Image Results  XR shoulder left 2+ views  Narrative: Interpreted By:  Nishant Lim,   STUDY:  XR SHOULDER LEFT 2+ VIEWS; 4/11/2024 1:43 pm      INDICATION:  Signs/Symptoms:Abrasion, unwitnessed fall, new LUE weakness- rule out  trauma injury.      COMPARISON:  None.      ACCESSION NUMBER(S):  [id number]      ORDERING CLINICIAN:  EZEKIEL RODRIGUEZ      FINDINGS:  There is no acute fracture or dislocation. Degenerative changes of  the glenohumeral articulation. Moderate acromioclavicular  degenerative change. The visualized ribs are intact. The visualized  is clear.      Impression: 1. No acute fracture or major dislocation.  2. Moderate right shoulder joint degenerative changes.          Signed by: Nishant Lim 4/11/2024 2:03 PM  Dictation workstation:   [id number]  CT head wo IV contrast  Narrative: Interpreted By:  Alfreda Yoon,   STUDY:  CT HEAD WO IV CONTRAST;  4/11/2024 9:39 am      INDICATION:  Signs/Symptoms:LUE flaccid- new neuro change- known head bleed with  shift.      COMPARISON:  04/10/2024      ACCESSION NUMBER(S):  [id number]      ORDERING CLINICIAN:  EZEKIEL RODRIGUEZ      TECHNIQUE:  Examination was performed in the axial plane with sagittal and  coronal reconstructions.  Bone and soft tissue algorithms were  performed.      FINDINGS:  INTRACRANIAL:  There is a right subdural hematoma measuring 2.8 cm in transverse  dimension unchanged in size. This is predominantly hyperdense with  layering blood consistent with acute and chronic subdural hematoma.  There is unchanged 0.8 cm midline shift to the left. There is mass  effect and compression on the right lateral ventricle. There is  subfalcine herniation. There is underlying cerebral atrophy.      EXTRACRANIAL:  Visualized paranasal sinuses and mastoids are clear.      Impression: 1. Stable CT head.  2. Mixed acute on chronic right subdural hematoma with 0.8 cm midline  shift to the left. There is subfalcine herniation.      MACRO:  None      Signed by: Alfreda Yoon 4/11/2024 9:59 AM  Dictation workstation:   [id number]      Physical Exam    Relevant Results               Assessment/Plan   This patient currently has cardiac telemetry ordered; if you would like to modify or discontinue the telemetry order, click here to go to the orders activity to modify/discontinue the order.              Principal Problem:    SDH (subdural hematoma) (CMS/[facility])        Timo Miranda MD  Physician  Internal Medicine     Consults      Signed     Date of Service: 4/10/2024 12:58 PM     Signed       Expand All Collapse All    Consults     Reason For Consult  Patient fully evaluated on April 10.     History Of Present Illness  Roman Lakhani is a 89 y.o. male presenting with with fall at his skilled nursing with subdural hematoma..     Past Medical History  He has no past medical history on file.     Surgical History  He has no past surgical history on file.     Social History  He has no history on file for tobacco use, alcohol use, and drug use.     Family History  Family History   No family history on file.        Allergies  Patient has no known allergies.     Review of Systems        Physical Exam           Last Recorded Vitals  /74    Pulse 79   Temp 36.3 °C (97.3 °F)   Resp 18   Wt 81.6 kg (180 lb)   SpO2 94%      Relevant Results           Assessment/Plan            Date of Service: 4/10/2024  7:36 AM           Attested Addendum        Expand All Collapse All    History Of Present Illness  Roman Lakhani is a 89 y.o. Japanese speaking male with past medical history significant for dementia, CKD, DM, COPD w/ chronic resp failure (O2 dependent), paranoid schizophrenia, cardiomyopathy, anxiety/depression, HTN, HLD, BPH, and GERD who presented to [facility] ED 4/10 from [facility] s/p unwitnessed fall. Majority of history collected from chart and staff d/t dementia and language barrier.      In the ED, patient hemodynamically stable, afebrile. Labs remarkable for WBC 12.3, Na 13, glucose 179. Patient pan scanned with the following significant results: acute on chronic right frontoparietal SDH measuring up to 2.8cm as well as cerebral sulcal effacement, narrowing of the right lateral and 3rd ventricles and 5mm leftward midline shift. ED physician discussed patient with NSGY with recommendations for nonop management, ok for patient to remain at [facility]. Patient admitted to trauma service with consult to NSGY for continued management.      A: Airway intact  B: Breathing spontaneously, breath sounds are bilateral and equal  C: Pulses 2+throughout and equal.    D: Pupils equal and reactive, GCS 14 (E4, V4, M6). Moving all 4 extremities  E: Patient exposed and additional injuries noted; Warm blankets placed on patient     Secondary Survey:  NEURO: GCS 14, CN II-XII intact, VERNON equally, muscle strength 5/5  HEAD: NC/AT, No lacerations or abrasions, no bony step offs, midface stable.  EENT: PERRL, EOMI. Pupils 4-2mm b/l. external ear without laceration. Nasal septum midline, no crepitus or septal hematoma. Oral mucosa and tongue without lacerations, teeth in place.   NECK: No cervical spine tenderness or step offs, no lacerations or  "abrasions, trachea midline. No JVD.  RESPIRATORY/CHEST: No abrasions, contusions, crepitus or tenderness to palpation. Non-labored, equal chest expansion, CTAB, no W/R/R.  CV: RRR. Pulses bilateral: 2+ radial, 2+DP. No TTP of chest  ABDOMEN: soft, nontender, mild diffuse tenderness on palpation. No scars, abrasions or lacerations.  PELVIS: Stable to compression.  : nml external genitalia, no blood at urethral meatus  RECTAL: rectal tone deferred.  BACK/SPINE: No thoracic midline tenderness, step-offs or deformities. No lumbar midline tenderness, step-offs, or deformities.  No abrasions, hematomas or lacerations noted.  EXTREMITIES: Mild BLE edema. Nml ROM. Pain on palpation right thigh without obvious outward signs of trauma. No deformities, lacerations or contusions.        Past Medical History  Medical History   No past medical history on file.         Surgical History  Surgical History   No past surgical history on file.         Social History  He has no history on file for tobacco use, alcohol use, and drug use.     Family History  Family History   No family history on file.         Allergies  Patient has no known allergies.     Review of Systems     Physical Exam     Last Recorded Vitals  Blood pressure 133/71, pulse 84, temperature 36.6 °C (97.9 °F), resp. rate 18, height 1.676 m (5' 6\"), weight 81.6 kg (180 lb), SpO2 98%.     Relevant Results                     Results for orders placed or performed during the hospital encounter of 04/10/24 (from the past 24 hour(s))   CBC and Auto Differential   Result Value Ref Range     WBC 12.3 (H) 4.4 - 11.3 x10*3/uL     nRBC 0.0 0.0 - 0.0 /100 WBCs     RBC 3.97 (L) 4.50 - 5.90 x10*6/uL     Hemoglobin 11.8 (L) 13.5 - 17.5 g/dL     Hematocrit 36.4 (L) 41.0 - 52.0 %     MCV 92 80 - 100 fL     MCH 29.7 26.0 - 34.0 pg     MCHC 32.4 32.0 - 36.0 g/dL     RDW 14.0 11.5 - 14.5 %     Platelets 270 150 - 450 x10*3/uL     Neutrophils % 80.6 40.0 - 80.0 %     Immature " Granulocytes %, Automated 0.2 0.0 - 0.9 %     Lymphocytes % 12.0 13.0 - 44.0 %     Monocytes % 5.9 2.0 - 10.0 %     Eosinophils % 0.9 0.0 - 6.0 %     Basophils % 0.4 0.0 - 2.0 %     Neutrophils Absolute 9.88 (H) 1.60 - 5.50 x10*3/uL     Immature Granulocytes Absolute, Automated 0.03 0.00 - 0.50 x10*3/uL     Lymphocytes Absolute 1.47 0.80 - 3.00 x10*3/uL     Monocytes Absolute 0.73 0.05 - 0.80 x10*3/uL     Eosinophils Absolute 0.11 0.00 - 0.40 x10*3/uL     Basophils Absolute 0.05 0.00 - 0.10 x10*3/uL   Comprehensive metabolic panel   Result Value Ref Range     Glucose 179 (H) 74 - 99 mg/dL     Sodium 133 (L) 136 - 145 mmol/L     Potassium 4.7 3.5 - 5.3 mmol/L     Chloride 98 98 - 107 mmol/L     Bicarbonate 23 21 - 32 mmol/L     Anion Gap 17 10 - 20 mmol/L     Urea Nitrogen 16 6 - 23 mg/dL     Creatinine 1.17 0.50 - 1.30 mg/dL     eGFR 58 (L) >60 mL/min/1.73m*2     Calcium 9.5 8.6 - 10.3 mg/dL     Albumin 4.3 3.4 - 5.0 g/dL     Alkaline Phosphatase 86 33 - 136 U/L     Total Protein 7.0 6.4 - 8.2 g/dL     AST 23 9 - 39 U/L     Bilirubin, Total 1.5 (H) 0.0 - 1.2 mg/dL     ALT 12 10 - 52 U/L   aPTT   Result Value Ref Range     aPTT 30 27 - 38 seconds   Protime-INR   Result Value Ref Range     Protime 11.7 9.8 - 12.8 seconds     INR 1.0 0.9 - 1.1   Type and Screen   Result Value Ref Range     ABO TYPE O       Rh TYPE POS       ANTIBODY SCREEN NEG        XR femur right 2+ views     Result Date: 4/10/2024  STUDY: Femur Radiographs; 4/10/2024 6:12 AM INDICATION: Trauma. COMPARISON: None Available. ACCESSION NUMBER(S): [id number] ORDERING CLINICIAN: IRVING BECERRA TECHNIQUE:  Four view(s) of the right femur. FINDINGS:  Right-sided superior and inferior pubic rami and ischium normal. No fracture. The iliopectineal line on the right is normal. The femoral head is localized normally in the acetabular fossa. Femoral head appears to be normal. Femoral neck and intertrochanteric region appear to be normal. No fracture or  trabecular line disruption. The femoral shaft appears to be normal. There are vessel calcifications identified. Small knee effusion suspected. Patellofemoral, medial and lateral compartment severe degenerative changes are identified. No definite evidence of fracture involving the distal femoral shaft or the proximal tibial plateau or proximal fibula in their limited visualized extent.     No fracture or subluxation of the right femur. Severe tricompartmental degenerative changes of the right knee. Small knee effusion suspected. Signed by Godwin Riggs MD     XR pelvis 1-2 views     Result Date: 4/10/2024  STUDY: Pelvis Radiographs; 4/10/2024 6:12 AM INDICATION: Trauma. COMPARISON: None Available. ACCESSION NUMBER(S): [id number] ORDERING CLINICIAN: IRVING BECERRA TECHNIQUE:  One view(s) of the pelvis. FINDINGS:  Lower lumbar spine degenerative changes. Iliac wings normal with esthesiopathic changes. SI joint show degenerative changes. Bilateral hip DJD.  Superior and inferior pubic rami normal. Both hips appear to be localized normally in the acetabulum. Intertrochanteric region normal. Femoral head normal. Femoral neck appears normal. No distinct evidence of fracture identified trabecular lines appear to be normal. Proximal femoral shafts appear normal. Visualized portions of the pelvis remarkable for phleboliths.     Degenerative changes of the lower lumbar spine, hips  and SI joints. No evidence of fracture or subluxation. Signed by Godwin Riggs MD     XR chest 1 view     Result Date: 4/10/2024  STUDY: Chest Radiograph;  4/10/2024 6:12 AM INDICATION: Cough. COMPARISON: None Available. ACCESSION NUMBER(S): [id number] ORDERING CLINICIAN: IRVING BECERRA TECHNIQUE:  Frontal chest was obtained at 06:12 hours. FINDINGS: CARDIOMEDIASTINAL SILHOUETTE: Cardiomediastinal silhouette is normal in size and configuration. Calcified mediastinal lymph nodes. Airway normal.  LUNGS: Lungs are clear. Basilar atelectasis  and scarring. No pneumothorax. No focal nodule  ABDOMEN: No remarkable upper abdominal findings. No free air.  BONES: No acute osseous changes. Degenerative changes of both shoulders and thoracic spine. No focal rib abnormality is identified.     Normal size heart. Evidence of prior granulomatous disease. Basilar atelectatic changes. No focal infiltrate. Signed by Godwin Riggs MD     CT head wo IV contrast     Result Date: 4/10/2024  Interpreted By:  Arnulfo Moncada, STUDY: CT HEAD WO IV CONTRAST; CT CERVICAL SPINE WO IV CONTRAST; ; 4/10/2024 6:00 am   INDICATION: Signs/Symptoms:trauma.   COMPARISON: None.   ACCESSION NUMBER(S): [id number]; [id number]   ORDERING CLINICIAN: IRVING BECERRA   TECHNIQUE: Noncontrast CT exams of the head and cervical spine with multiplanar reformations.   FINDINGS: BRAIN PARENCHYMA: Gray-white matter interfaces are preserved. 5 mm of leftward midline shift at the level of foramen of Monro.   HEMORRHAGE: There is an acute on chronic right frontoparietal subdural hematoma measuring up to 2.8 cm in maximal thickness along the anterolateral frontal convexity. VENTRICLES and EXTRA-AXIAL SPACES: There is cerebral sulcal effacement. Asymmetric narrowing of the right lateral ventricle and narrowing of the 3rd ventricle. No definite hydrocephalus. EXTRACRANIAL SOFT TISSUES: Small right posterior scalp hematoma. PARANASAL SINUSES/MASTOIDS: The visualized paranasal sinuses and mastoid air cells are aerated. CALVARIUM: No depressed skull fracture. No destructive osseous lesion.   OTHER FINDINGS: None.   CERVICAL SPINE:   Mild reversal of the normal cervical lordosis could reflect positioning or muscle spasm. ALIGNMENT: Grade 1 degenerative anterolisthesis at C2-C3 and C3-C4. Trace degenerative anterolisthesis at C7-T1. Alignment is otherwise maintained. VERTEBRAE: No acute fracture is seen. Vertebral stature is maintained. Degenerative endplate osteophytosis and uncovertebral hypertrophy in  conjunction with moderate to severe degenerative disc height loss, overall worst at C7-T1. Moderate to severe multilevel hypertrophic facet osteoarthropathy. SPINAL CANAL: No critical spinal canal stenosis. PREVERTEBRAL SOFT TISSUES: No prevertebral soft tissue swelling. LUNG APICES: Imaged portion of the lung apices are within normal limits.   OTHER FINDINGS: None.        There is an acute on chronic right frontoparietal subdural hematoma measuring up to 2.8 cm in maximal thickness along the anterolateral frontal convexity. There is cerebral sulcal effacement, narrowing of the right lateral and 3rd ventricles and 5 mm of leftward midline shift. Neurosurgical evaluation recommended.   No acute cervical spine fracture or malalignment. Severe spondylosis.     MACRO: Arnulfo Moncada discussed the significance and urgency of this critical finding by epic secure chat with  IRVING BECERRA on 4/10/2024 at 6:25 am.  (**-RCF-**) Findings:  See findings.     Signed by: Arnulfo Moncada 4/10/2024 6:25 AM Dictation workstation:   [id number]     CT cervical spine wo IV contrast     Result Date: 4/10/2024  Interpreted By:  Arnulfo Moncada, STUDY: CT HEAD WO IV CONTRAST; CT CERVICAL SPINE WO IV CONTRAST; ; 4/10/2024 6:00 am   INDICATION: Signs/Symptoms:trauma.   COMPARISON: None.   ACCESSION NUMBER(S): [id number]; [id number]   ORDERING CLINICIAN: IRVING BECERRA   TECHNIQUE: Noncontrast CT exams of the head and cervical spine with multiplanar reformations.   FINDINGS: BRAIN PARENCHYMA: Gray-white matter interfaces are preserved. 5 mm of leftward midline shift at the level of foramen of Monro.   HEMORRHAGE: There is an acute on chronic right frontoparietal subdural hematoma measuring up to 2.8 cm in maximal thickness along the anterolateral frontal convexity. VENTRICLES and EXTRA-AXIAL SPACES: There is cerebral sulcal effacement. Asymmetric narrowing of the right lateral ventricle and narrowing of the 3rd ventricle. No definite  hydrocephalus. EXTRACRANIAL SOFT TISSUES: Small right posterior scalp hematoma. PARANASAL SINUSES/MASTOIDS: The visualized paranasal sinuses and mastoid air cells are aerated. CALVARIUM: No depressed skull fracture. No destructive osseous lesion.   OTHER FINDINGS: None.   CERVICAL SPINE:   Mild reversal of the normal cervical lordosis could reflect positioning or muscle spasm. ALIGNMENT: Grade 1 degenerative anterolisthesis at C2-C3 and C3-C4. Trace degenerative anterolisthesis at C7-T1. Alignment is otherwise maintained. VERTEBRAE: No acute fracture is seen. Vertebral stature is maintained. Degenerative endplate osteophytosis and uncovertebral hypertrophy in conjunction with moderate to severe degenerative disc height loss, overall worst at C7-T1. Moderate to severe multilevel hypertrophic facet osteoarthropathy. SPINAL CANAL: No critical spinal canal stenosis. PREVERTEBRAL SOFT TISSUES: No prevertebral soft tissue swelling. LUNG APICES: Imaged portion of the lung apices are within normal limits.   OTHER FINDINGS: None.        There is an acute on chronic right frontoparietal subdural hematoma measuring up to 2.8 cm in maximal thickness along the anterolateral frontal convexity. There is cerebral sulcal effacement, narrowing of the right lateral and 3rd ventricles and 5 mm of leftward midline shift. Neurosurgical evaluation recommended.   No acute cervical spine fracture or malalignment. Severe spondylosis.     MACRO: Arnulfo Moncada discussed the significance and urgency of this critical finding by epic secure chat with  IRVING BECERRA on 4/10/2024 at 6:25 am.  (**-RCF-**) Findings:  See findings.     Signed by: Arunlfo Moncada 4/10/2024 6:25 AM Dictation workstation:   [id number]            Assessment/Plan   Principal Problem:    SDH (subdural hematoma) (CMS/HCC)     Roman Lakhani is a 89 y.o. Icelandic speaking male with past medical history significant for dementia, CKD, DM, COPD w/ chronic resp failure (O2  dependent), paranoid schizophrenia, cardiomyopathy, anxiety/depression, HTN, HLD, BPH, and GERD who presented to [facility] ED 4/10 from [facility] s/p unwitnessed fall. Patient pan scanned with the following significant results: acute on chronic right frontoparietal SDH measuring up to 2.8cm as well as cerebral sulcal effacement, narrowing of the right lateral and 3rd ventricles and 5mm leftward midline shift. Patient admitted to trauma service with consult to NSGY for continued management.      List of clinically significant injuries/problems:  Acute on chronic 2.8 cm SDH with 5 mm leftward midline shift  Small right knee effusion with severe tricompartmental degenerative changes  Leukocytosis  Hyperglycemia  Bed bugs     Assessment/Plan:     # SDH  - NSGY consulted, appreciate input  - pending repeat head CT at 1230     -> NPO pending stability of repeat CT  - maintain HOB >30 degrees  - maintain SBP <160  - Q4H neurochecks  - avoid narcotics if possible given hx dementia with TBI d/t concern for development of delirium  - acetaminophen prn for pain  - PT/OT     # Leukocytosis  - likely reactive  - if uptrending will look for additional causes  - am labs     # Dementia  # DM  # CKD  # COPD  # Chronic resp failure (O2 dependent)  # HTN  # HLD  - medicine consulted  - ISS while inpatient  - restart home meds when med rec complete and medically appropriate      # Bed bugs  - contact precautions     # DVT ppx  - hold until cleared by NSGY  - SCD's     Plan not finalized until discussed with Dr. Pickens                 I spent 60 minutes in the professional and overall care of this patient.                                           Cosigned by:         Revision History    Patient fully evaluated on April 10.  Patient more awake and alert this afternoon was minimally responsive earlier.  Patient has a rash over his forearms consistent with eczema.  Questionable bedbug was found in the emergency room and  awaiting pathologic evaluation.  I had fully examined the patient and found no further bedbugs at this time.  Recommend triamcinolone cream twice daily to areas of eczema.  Recheck labs in AM.  Further neurochecks overnight.        MD Timo Medina MD

## 2024-04-11 NOTE — NURSING NOTE
With rounds and assessment, patient is alert, awakens and conversant.  Language barrier/Tristanian speaking, student nurse able to communicate with patient.   Patient incontinent, no previous urine output charted, bladder scan completed for 143 ml.  External catheter applied for more accurate I&O. Hygiene provided.    Scratches noted to bilateral arm R>L and lower back.  Kenalog cream applied with hygiene at this time.  No bed bugs found on skin or in linens when changed. precautions maintained.   Patient verbalizes pain, is unable to qualify to where the pain is, however, does grimace and moan with repositioning.

## 2024-04-12 ENCOUNTER — APPOINTMENT (OUTPATIENT)
Dept: RADIOLOGY | Facility: HOSPITAL | Age: 89
DRG: 082 | End: 2024-04-12
Payer: COMMERCIAL

## 2024-04-12 LAB
ALBUMIN SERPL BCP-MCNC: 3.8 G/DL (ref 3.4–5)
ALP SERPL-CCNC: 77 U/L (ref 33–136)
ALT SERPL W P-5'-P-CCNC: 16 U/L (ref 10–52)
ANION GAP SERPL CALC-SCNC: 11 MMOL/L (ref 10–20)
AST SERPL W P-5'-P-CCNC: 41 U/L (ref 9–39)
BILIRUB SERPL-MCNC: 1 MG/DL (ref 0–1.2)
BUN SERPL-MCNC: 15 MG/DL (ref 6–23)
CALCIUM SERPL-MCNC: 8.9 MG/DL (ref 8.6–10.3)
CHLORIDE SERPL-SCNC: 101 MMOL/L (ref 98–107)
CO2 SERPL-SCNC: 27 MMOL/L (ref 21–32)
CREAT SERPL-MCNC: 1.08 MG/DL (ref 0.5–1.3)
EGFRCR SERPLBLD CKD-EPI 2021: 64 ML/MIN/1.73M*2
ERYTHROCYTE [DISTWIDTH] IN BLOOD BY AUTOMATED COUNT: 14.2 % (ref 11.5–14.5)
GLUCOSE BLD MANUAL STRIP-MCNC: 102 MG/DL (ref 74–99)
GLUCOSE BLD MANUAL STRIP-MCNC: 157 MG/DL (ref 74–99)
GLUCOSE BLD MANUAL STRIP-MCNC: 162 MG/DL (ref 74–99)
GLUCOSE BLD MANUAL STRIP-MCNC: 195 MG/DL (ref 74–99)
GLUCOSE SERPL-MCNC: 104 MG/DL (ref 74–99)
HCT VFR BLD AUTO: 35.4 % (ref 41–52)
HGB BLD-MCNC: 11.7 G/DL (ref 13.5–17.5)
HOLD SPECIMEN: NORMAL
MCH RBC QN AUTO: 30.1 PG (ref 26–34)
MCHC RBC AUTO-ENTMCNC: 33.1 G/DL (ref 32–36)
MCV RBC AUTO: 91 FL (ref 80–100)
NRBC BLD-RTO: 0 /100 WBCS (ref 0–0)
PLATELET # BLD AUTO: 276 X10*3/UL (ref 150–450)
POTASSIUM SERPL-SCNC: 4 MMOL/L (ref 3.5–5.3)
PROT SERPL-MCNC: 6.4 G/DL (ref 6.4–8.2)
RBC # BLD AUTO: 3.89 X10*6/UL (ref 4.5–5.9)
SODIUM SERPL-SCNC: 135 MMOL/L (ref 136–145)
WBC # BLD AUTO: 10.8 X10*3/UL (ref 4.4–11.3)

## 2024-04-12 PROCEDURE — 80053 COMPREHEN METABOLIC PANEL: CPT | Performed by: INTERNAL MEDICINE

## 2024-04-12 PROCEDURE — 2500000004 HC RX 250 GENERAL PHARMACY W/ HCPCS (ALT 636 FOR OP/ED): Performed by: NURSE PRACTITIONER

## 2024-04-12 PROCEDURE — 36415 COLL VENOUS BLD VENIPUNCTURE: CPT | Performed by: INTERNAL MEDICINE

## 2024-04-12 PROCEDURE — 82947 ASSAY GLUCOSE BLOOD QUANT: CPT

## 2024-04-12 PROCEDURE — 2060000001 HC INTERMEDIATE ICU ROOM DAILY

## 2024-04-12 PROCEDURE — 2500000002 HC RX 250 W HCPCS SELF ADMINISTERED DRUGS (ALT 637 FOR MEDICARE OP, ALT 636 FOR OP/ED): Mod: MUE | Performed by: NURSE PRACTITIONER

## 2024-04-12 PROCEDURE — 71045 X-RAY EXAM CHEST 1 VIEW: CPT

## 2024-04-12 PROCEDURE — 71045 X-RAY EXAM CHEST 1 VIEW: CPT | Performed by: RADIOLOGY

## 2024-04-12 PROCEDURE — 99231 SBSQ HOSP IP/OBS SF/LOW 25: CPT | Performed by: NURSE PRACTITIONER

## 2024-04-12 PROCEDURE — 97165 OT EVAL LOW COMPLEX 30 MIN: CPT | Mod: GO

## 2024-04-12 PROCEDURE — 92610 EVALUATE SWALLOWING FUNCTION: CPT | Mod: GN | Performed by: SPEECH-LANGUAGE PATHOLOGIST

## 2024-04-12 PROCEDURE — 2500000001 HC RX 250 WO HCPCS SELF ADMINISTERED DRUGS (ALT 637 FOR MEDICARE OP): Performed by: NURSE PRACTITIONER

## 2024-04-12 PROCEDURE — 99223 1ST HOSP IP/OBS HIGH 75: CPT

## 2024-04-12 PROCEDURE — 97161 PT EVAL LOW COMPLEX 20 MIN: CPT | Mod: GP

## 2024-04-12 PROCEDURE — 85027 COMPLETE CBC AUTOMATED: CPT | Performed by: INTERNAL MEDICINE

## 2024-04-12 RX ADMIN — ATORVASTATIN CALCIUM 40 MG: 40 TABLET, FILM COATED ORAL at 09:37

## 2024-04-12 RX ADMIN — INSULIN HUMAN 2 UNITS: 100 INJECTION, SOLUTION PARENTERAL at 17:47

## 2024-04-12 RX ADMIN — ACETAMINOPHEN 650 MG: 325 TABLET ORAL at 09:35

## 2024-04-12 RX ADMIN — LEVOTHYROXINE SODIUM 150 MCG: 150 TABLET ORAL at 06:26

## 2024-04-12 RX ADMIN — LISINOPRIL 5 MG: 5 TABLET ORAL at 09:36

## 2024-04-12 RX ADMIN — SPIRONOLACTONE 25 MG: 25 TABLET, FILM COATED ORAL at 09:36

## 2024-04-12 RX ADMIN — FAMOTIDINE 20 MG: 20 TABLET ORAL at 09:36

## 2024-04-12 RX ADMIN — POLYETHYLENE GLYCOL 3350 17 G: 17 POWDER, FOR SOLUTION ORAL at 09:37

## 2024-04-12 RX ADMIN — ACETAMINOPHEN 650 MG: 325 TABLET ORAL at 20:44

## 2024-04-12 RX ADMIN — TRIAMCINOLONE ACETONIDE: 1 OINTMENT TOPICAL at 20:44

## 2024-04-12 RX ADMIN — PANTOPRAZOLE SODIUM 20 MG: 20 TABLET, DELAYED RELEASE ORAL at 06:26

## 2024-04-12 RX ADMIN — INSULIN GLARGINE 17 UNITS: 100 INJECTION, SOLUTION SUBCUTANEOUS at 17:46

## 2024-04-12 RX ADMIN — DONEPEZIL HYDROCHLORIDE 10 MG: 10 TABLET ORAL at 09:36

## 2024-04-12 RX ADMIN — DONEPEZIL HYDROCHLORIDE 10 MG: 10 TABLET ORAL at 20:44

## 2024-04-12 RX ADMIN — TAMSULOSIN HYDROCHLORIDE 0.4 MG: 0.4 CAPSULE ORAL at 09:36

## 2024-04-12 RX ADMIN — TRIAMCINOLONE ACETONIDE: 1 OINTMENT TOPICAL at 09:37

## 2024-04-12 SDOH — SOCIAL STABILITY: SOCIAL INSECURITY: ARE THERE ANY APPARENT SIGNS OF INJURIES/BEHAVIORS THAT COULD BE RELATED TO ABUSE/NEGLECT?: NO

## 2024-04-12 SDOH — HEALTH STABILITY: MENTAL HEALTH: EXPERIENCED ANY OF THE FOLLOWING LIFE EVENTS: DEATH OF FAMILY/FRIEND

## 2024-04-12 SDOH — SOCIAL STABILITY: SOCIAL INSECURITY: WERE YOU ABLE TO COMPLETE ALL THE BEHAVIORAL HEALTH SCREENINGS?: YES

## 2024-04-12 SDOH — SOCIAL STABILITY: SOCIAL INSECURITY: ABUSE: ADULT

## 2024-04-12 SDOH — SOCIAL STABILITY: SOCIAL INSECURITY: ARE YOU OR HAVE YOU BEEN THREATENED OR ABUSED PHYSICALLY, EMOTIONALLY, OR SEXUALLY BY ANYONE?: NO

## 2024-04-12 SDOH — SOCIAL STABILITY: SOCIAL INSECURITY: DO YOU FEEL ANYONE HAS EXPLOITED OR TAKEN ADVANTAGE OF YOU FINANCIALLY OR OF YOUR PERSONAL PROPERTY?: NO

## 2024-04-12 SDOH — SOCIAL STABILITY: SOCIAL INSECURITY: HAS ANYONE EVER THREATENED TO HURT YOUR FAMILY OR YOUR PETS?: NO

## 2024-04-12 SDOH — SOCIAL STABILITY: SOCIAL INSECURITY: DO YOU FEEL UNSAFE GOING BACK TO THE PLACE WHERE YOU ARE LIVING?: NO

## 2024-04-12 SDOH — SOCIAL STABILITY: SOCIAL INSECURITY: HAVE YOU HAD THOUGHTS OF HARMING ANYONE ELSE?: NO

## 2024-04-12 SDOH — SOCIAL STABILITY: SOCIAL INSECURITY: DOES ANYONE TRY TO KEEP YOU FROM HAVING/CONTACTING OTHER FRIENDS OR DOING THINGS OUTSIDE YOUR HOME?: NO

## 2024-04-12 ASSESSMENT — COGNITIVE AND FUNCTIONAL STATUS - GENERAL
STANDING UP FROM CHAIR USING ARMS: TOTAL
PERSONAL GROOMING: TOTAL
WALKING IN HOSPITAL ROOM: TOTAL
HELP NEEDED FOR BATHING: TOTAL
CLIMB 3 TO 5 STEPS WITH RAILING: TOTAL
EATING MEALS: TOTAL
DAILY ACTIVITIY SCORE: 10
TOILETING: TOTAL
MOVING FROM LYING ON BACK TO SITTING ON SIDE OF FLAT BED WITH BEDRAILS: A LOT
TOILETING: TOTAL
MOVING FROM LYING ON BACK TO SITTING ON SIDE OF FLAT BED WITH BEDRAILS: A LOT
TURNING FROM BACK TO SIDE WHILE IN FLAT BAD: TOTAL
DRESSING REGULAR UPPER BODY CLOTHING: A LOT
TURNING FROM BACK TO SIDE WHILE IN FLAT BAD: TOTAL
DRESSING REGULAR LOWER BODY CLOTHING: TOTAL
EATING MEALS: A LITTLE
CLIMB 3 TO 5 STEPS WITH RAILING: TOTAL
HELP NEEDED FOR BATHING: TOTAL
MOBILITY SCORE: 7
MOVING TO AND FROM BED TO CHAIR: TOTAL
WALKING IN HOSPITAL ROOM: TOTAL
DAILY ACTIVITIY SCORE: 6
MOVING TO AND FROM BED TO CHAIR: TOTAL
STANDING UP FROM CHAIR USING ARMS: TOTAL
MOBILITY SCORE: 7
DRESSING REGULAR UPPER BODY CLOTHING: TOTAL
PERSONAL GROOMING: A LOT
DRESSING REGULAR LOWER BODY CLOTHING: TOTAL

## 2024-04-12 ASSESSMENT — PATIENT HEALTH QUESTIONNAIRE - PHQ9
1. LITTLE INTEREST OR PLEASURE IN DOING THINGS: NOT AT ALL
2. FEELING DOWN, DEPRESSED OR HOPELESS: NOT AT ALL
SUM OF ALL RESPONSES TO PHQ9 QUESTIONS 1 & 2: 0

## 2024-04-12 ASSESSMENT — LIFESTYLE VARIABLES
HOW OFTEN DO YOU HAVE A DRINK CONTAINING ALCOHOL: NEVER
SUBSTANCE_ABUSE_PAST_12_MONTHS: NO
AUDIT-C TOTAL SCORE: 0
AUDIT-C TOTAL SCORE: 0
HOW OFTEN DO YOU HAVE 6 OR MORE DRINKS ON ONE OCCASION: NEVER
SKIP TO QUESTIONS 9-10: 1
HOW MANY STANDARD DRINKS CONTAINING ALCOHOL DO YOU HAVE ON A TYPICAL DAY: PATIENT DOES NOT DRINK
PRESCIPTION_ABUSE_PAST_12_MONTHS: NO

## 2024-04-12 ASSESSMENT — PAIN DESCRIPTION - LOCATION: LOCATION: LEG

## 2024-04-12 ASSESSMENT — PAIN - FUNCTIONAL ASSESSMENT
PAIN_FUNCTIONAL_ASSESSMENT: WONG-BAKER FACES
PAIN_FUNCTIONAL_ASSESSMENT: FLACC (FACE, LEGS, ACTIVITY, CRY, CONSOLABILITY)

## 2024-04-12 ASSESSMENT — PAIN SCALES - WONG BAKER
WONGBAKER_NUMERICALRESPONSE: HURTS WHOLE LOT
WONGBAKER_NUMERICALRESPONSE: NO HURT

## 2024-04-12 ASSESSMENT — PAIN SCALES - GENERAL: PAINLEVEL_OUTOF10: 0 - NO PAIN

## 2024-04-12 NOTE — PROGRESS NOTES
Occupational Therapy    Evaluation    Patient Name: Roman Lakhani  MRN: [id number]  Today's Date: 4/12/2024  Time Calculation  Start Time: 0859  Stop Time: 0924  Time Calculation (min): 25 min        Assessment:     OT Assessment Results: Decreased ADL status, Decreased upper extremity strength, Decreased safe judgment during ADL, Decreased endurance, Decreased functional mobility  Plan:  Treatment Interventions: ADL retraining, Functional transfer training, UE strengthening/ROM, Endurance training, Equipment evaluation/education, Patient/family training, Cognitive reorientation, Compensatory technique education  OT Frequency: 3 times per week  OT Discharge Recommendations: Moderate intensity level of continued care  OT - OK to Discharge: Yes (once medically appropriate to next level of care)  Treatment Interventions: ADL retraining, Functional transfer training, UE strengthening/ROM, Endurance training, Equipment evaluation/education, Patient/family training, Cognitive reorientation, Compensatory technique education    Subjective   Current Problem:  1. SDH (subdural hematoma) (Multi)          General:  General  Reason for Referral: OT eval and tx; ADLs. dx; SDH (subdural hematoma) (CMS/HCC)/Fall. 4/12/24 neuro consult: nonsurgical management, signing off  Referred By: Nelia  Past Medical History Relevant to Rehab: 89 y.o. Amharic speaking male with past medical history significant for dementia, CKD, DM, COPD w/ chronic resp failure (O2 dependent), paranoid schizophrenia, cardiomyopathy, anxiety/depression, HTN, HLD, BPH, and GERD who presented to [facility] ED 4/10 from [facility] s/p unwitnessed fall. Patient pan scanned with the following significant results: acute on chronic right frontoparietal SDH measuring up to 2.8cm as well as cerebral sulcal effacement, narrowing of the right lateral and 3rd ventricles and 5mm leftward midline shift.  Family/Caregiver Present:  (patient daughter present,  supportive. assisting with translating as patient primarily Khmer speaking)  Co-Treatment: PT  Co-Treatment Reason: for safety and to maximize therapeutic performance  Prior to Session Communication: Bedside nurse  Patient Position Received:  (patient seated upright in bed with 2 rails up at start and end of eval, call light in reach. bed alarm failing to engage, RN notified)  General Comment: patient pleasant and cooperative; patient with vomitting end of session, RN notified  Precautions:  Medical Precautions:  (fall, alarm)    Pain:  Pain Assessment  Pain Assessment:  (patient with pain in LLE; did not rate)    Objective   Cognition:  Overall Cognitive Status:  (oriented to self only)           Home Living:  Type of Home:  (per chart review and patient daughter report: patient is from [facility], has been there for 3 months. daughter reports they would like to d/c to a different facility. patient required assist with mobility with use of rollator. assist for ADLs.)       ADL:  LE Dressing Deficit:  (dependent assistance to lacey non skid socks at bed level)  ADL Comments: anticipate MAX A- dependent assisance for all ADLs based on performance with transfers and mobility this date  Activity Tolerance:  Endurance: Decreased tolerance for upright activites  Bed Mobility/Transfers: Bed Mobility  Bed Mobility:  (completed supine <-->sit with dependent assistance x 2; patient with strong lateral lean to left side noted at times. required dependent- MOD A throughout when seated EOB)    Transfers  Transfer:  (unable to complete due to safety concerns and patient with N/V; RN notified)     Sensation:  Light Touch: No apparent deficits  Strength:  Strength Comments: unable to formally assess due to language barrier and patient with N/V. appears to be grossly WFL for patient age      Outcome Measures:[facility] Daily Activity  Putting on and taking off regular lower body clothing: Total  Bathing (including washing,  rinsing, drying): Total  Putting on and taking off regular upper body clothing: A lot  Toileting, which includes using toilet, bedpan or urinal: Total  Taking care of personal grooming such as brushing teeth: A lot  Eating Meals: A little  Daily Activity - Total Score: 10        Education Documentation  Body Mechanics, taught by Erendira Gore OT at 4/12/2024 11:17 AM.  Learner: Patient  Readiness: Acceptance  Method: Explanation  Response: Verbalizes Understanding, Needs Reinforcement    Education Comments  No comments found.        OP EDUCATION:       Goals:  Encounter Problems       Encounter Problems (Active)       OT Goals       STG- patient will complete bed mobility (supine <-->sit ) with MOD A with use of bed rails in preparation for ADLs and functional transfers/mobility  (Progressing)       Start:  04/12/24    Expected End:  04/26/24            STG- patient will complete simple mobility/side steps with MOD A x 2 with use of ae/ad/dme prn (Progressing)       Start:  04/12/24    Expected End:  04/26/24            STG- patient will complete transfers to/from bed, chair, commode at MOD A x 2 with use of ae/ad/dme prn (Progressing)       Start:  04/12/24    Expected End:  04/26/24            STG- patient will complete grooming tasks with MIN A with use of ae/ad/dme prn (Progressing)       Start:  04/12/24    Expected End:  04/26/24            STG- patient will complete UB dressing with MIN A with use of ae/ad/dme prn (Progressing)       Start:  04/12/24    Expected End:  04/26/24

## 2024-04-12 NOTE — NURSING NOTE
With rounds, patient asleep in bed with no acute distress noted.  Patient has call light and personal items within reach.  Per report Barbadian speaking with language barrier.

## 2024-04-12 NOTE — PROGRESS NOTES
Speech-Language Pathology    SLP Adult Inpatient Speech-Language Pathology Clinical Swallow Evaluation    Patient Name: Roman Lakhani  MRN: [id number]  Today's Date: 4/12/2024   Time Calculation  Start Time: 1318  Stop Time: 1343  Time Calculation (min): 25 min     Current Problem:   1. SDH (subdural hematoma) (Multi)          Recommendations:  Risk for Aspiration: Yes  Additional Recommendations: Dysphagia treatment  Solid Diet Recommendations : NPO, alternative form of nutrition and meds.   Liquid Diet Recommendations: NPO, alternative form of hydration.     Assessment:  Pt is not safe for an oral diet given change in mentation, decreased level of alertness since vomiting episode, concern for aspiration on vomit. CXR pending. Pt is unable to engage in conversation, eyes are glassy, RN provides sternal rub, pt arouses for a few seconds, unable to maintain a level of alertness necessary for safe PO trials.   Suggest NPO until his mentation improves. ST to follow.     Plan:  Treatment/Interventions: Bolus trials, Diet recommendations, Patient/family education  SLP Plan: Skilled SLP  SLP Frequency: 2x per week  Discussed Risks/Benefits: Yes, Patient, Nursing  Patient/Caregiver Agreeable: Yes    Dysphagia Goals:    Patient will participate in re-assessment of swallowing with SLP for possible diet advancement from NPO as indicated.    Pt/caregiver education.       Subjective   Pt seen bedside, family present.  RN arrives d/t concern for pt's glassy eyes, unable to arouse.  RN provides sternal rub.  Per RN, pt was eating and drinking with her at breakfast today with assist, no concern for aspiration at that time. Pt vomited after PT/OT sat him upright. Now change in mentation.      Per H&P  Roman Lakhani is a 89 y.o. Malay speaking male with past medical history significant for dementia, CKD, DM, COPD w/ chronic resp failure (O2 dependent), paranoid schizophrenia, cardiomyopathy, anxiety/depression, HTN, HLD,  BPH, and GERD who presented to [facility] ED 4/10 from [facility] s/p unwitnessed fall. Majority of history collected from chart and staff d/t dementia and language barrier.      In the ED, patient hemodynamically stable, afebrile. Labs remarkable for WBC 12.3, Na 13, glucose 179. Patient pan scanned with the following significant results: acute on chronic right frontoparietal SDH measuring up to 2.8cm as well as cerebral sulcal effacement, narrowing of the right lateral and 3rd ventricles and 5mm leftward midline shift. ED physician discussed patient with NSGY with recommendations for nonop management, ok for patient to remain at [place]. Patient admitted to trauma service with consult to NSGY for continued management.     General Visit Information:  Patient Class: Inpatient  Living Environment: Nursing home (skilled/long-term) ([facility])  Caregiver Feedback: per RN, ate breakfast with assist this moring without concern for aspiration. After PT/OT sat him at edge of bed, he vomited with concern for aspiraiton of vomit. He is lethargic now, praying softly, eyes are glassy, difficult to arouse. Rn is aware/present.  [employer] Diet:  (unknown from SNF, no paperwork in hard chart. Was on easy to chew diet at admisison.)  Current Diet : NPO   Dysphagia Diagnosis:  This pt is unsafe for an oral diet today given change in mentation, poor level of alertness.     Inpatient:  Education Documentation  SLP has provided pt and caregiver/RN Amelia with the results and recommendations of this session.

## 2024-04-12 NOTE — PROGRESS NOTES
"Roman Lakhani is a 89 y.o. male on day 2 of admission presenting with SDH (subdural hematoma) (Multi).    Subjective   No adverse events overnight  Per RN, family is agreeable with no neurosurgical intervention       Objective     Physical Exam  Well nourished, well developed elderly male, resting in bed  Skin is warm / dry, stable lesions on BUE  Thorax is midline; chest expansion is symmetric  LUE remains weak  Abdomen is not distended      Last Recorded Vitals  Blood pressure 135/85, pulse 88, temperature 36.2 °C (97.2 °F), resp. rate 18, height 1.676 m (5' 6\"), weight 81.6 kg (180 lb), SpO2 92%.  Intake/Output last 3 Shifts:  I/O last 3 completed shifts:  In: 200 (2.4 mL/kg) [P.O.:200]  Out: 200 (2.4 mL/kg) [Urine:200 (0.1 mL/kg/hr)]  Weight: 81.6 kg     Relevant Results  Results for orders placed or performed during the hospital encounter of 04/10/24 (from the past 24 hour(s))   Comprehensive Metabolic Panel   Result Value Ref Range    Glucose 125 (H) 74 - 99 mg/dL    Sodium 134 (L) 136 - 145 mmol/L    Potassium 4.1 3.5 - 5.3 mmol/L    Chloride 101 98 - 107 mmol/L    Bicarbonate 25 21 - 32 mmol/L    Anion Gap 12 10 - 20 mmol/L    Urea Nitrogen 18 6 - 23 mg/dL    Creatinine 1.09 0.50 - 1.30 mg/dL    eGFR 64 >60 mL/min/1.73m*2    Calcium 8.8 8.6 - 10.3 mg/dL    Albumin 3.7 3.4 - 5.0 g/dL    Alkaline Phosphatase 77 33 - 136 U/L    Total Protein 6.1 (L) 6.4 - 8.2 g/dL    AST 49 (H) 9 - 39 U/L    Bilirubin, Total 1.5 (H) 0.0 - 1.2 mg/dL    ALT 16 10 - 52 U/L   CBC   Result Value Ref Range    WBC 9.0 4.4 - 11.3 x10*3/uL    nRBC 0.0 0.0 - 0.0 /100 WBCs    RBC 3.93 (L) 4.50 - 5.90 x10*6/uL    Hemoglobin 11.4 (L) 13.5 - 17.5 g/dL    Hematocrit 35.8 (L) 41.0 - 52.0 %    MCV 91 80 - 100 fL    MCH 29.0 26.0 - 34.0 pg    MCHC 31.8 (L) 32.0 - 36.0 g/dL    RDW 14.1 11.5 - 14.5 %    Platelets 258 150 - 450 x10*3/uL   POCT GLUCOSE   Result Value Ref Range    POCT Glucose 118 (H) 74 - 99 mg/dL   SST TOP   Result Value Ref " Range    Extra Tube Hold for add-ons.    POCT GLUCOSE   Result Value Ref Range    POCT Glucose 118 (H) 74 - 99 mg/dL   POCT GLUCOSE   Result Value Ref Range    POCT Glucose 203 (H) 74 - 99 mg/dL   POCT GLUCOSE   Result Value Ref Range    POCT Glucose 191 (H) 74 - 99 mg/dL     Scheduled medications  atorvastatin, 40 mg, oral, Daily  donepezil, 10 mg, oral, BID  famotidine, 20 mg, oral, Daily  insulin glargine, 17 Units, subcutaneous, q24h  insulin regular, 0-10 Units, subcutaneous, TID with meals  levothyroxine, 150 mcg, oral, Daily before breakfast  lisinopril, 5 mg, oral, Daily  pantoprazole, 20 mg, oral, Daily before breakfast  polyethylene glycol, 17 g, oral, Daily  spironolactone, 25 mg, oral, Daily  tamsulosin, 0.4 mg, oral, Daily  triamcinolone, , Topical, BID      Continuous medications     PRN medications  PRN medications: acetaminophen, dextrose, dextrose, glucagon, glucagon, hydrALAZINE, ipratropium-albuteroL, ondansetron      Assessment/Plan   Principal Problem:    SDH (subdural hematoma) (Multi)  Right SDH with midline shift, subfalcine herniation. Non-surgical due to extreme high risk per Neurosurgery team  Continue medical management with HOB > 30 degrees, SBP < 160, avoid anticoagulation  He remains DNR  Neurosurgery signing off. No further neurosurgery intervention     I spent 25 minutes in the professional and overall care of this patient.      Claudine Dillard, APRN-CNP

## 2024-04-12 NOTE — PROGRESS NOTES
Physical Therapy    Physical Therapy Evaluation    Patient Name: Roman Lakhani  MRN: [id number]  Today's Date: 4/12/2024   Time Calculation  Start Time: 0859  Stop Time: 0924  Time Calculation (min): 25 min    Assessment/Plan   PT Assessment  PT Assessment Results: Decreased strength, Decreased range of motion, Decreased endurance, Impaired balance, Decreased mobility, Impaired judgement, Decreased safety awareness, Pain  Rehab Prognosis: Good  Evaluation/Treatment Tolerance: Patient limited by pain, Patient limited by fatigue (nausea/vomiting)  Medical Staff Made Aware: Yes  End of Session Communication: Bedside nurse (informed of vomiting, informed of bed alarm not functioning)  Assessment Comment: Continued skilled PT intervention indicated to facilitate increased strength, balance & gait stability  End of Session Patient Position: Bed, 2 rail up (alarm malfuncitoning, RN informed, hob elevated, call light in reach, daughter present, all needs met)  IP OR SWING BED PT PLAN  Inpatient or Swing Bed: Inpatient  PT Plan  Treatment/Interventions: Bed mobility, Transfer training, Gait training, Balance training, Range of motion, Therapeutic exercise, Therapeutic activity  PT Plan: Skilled PT  PT Frequency: 3 times per week  PT Discharge Recommendations: Moderate intensity level of continued care  PT Recommended Transfer Status: Total assist  PT - OK to Discharge: Yes (to next level of care when cleared by medical team)    Subjective     Current Problem:  Patient Active Problem List   Diagnosis    SDH (subdural hematoma) (Multi)       General Visit Information:  General  Reason for Referral: PT eval & treat/impaired mobility DX: fall/SDH; 4/12/24 neuro consult: nonsurgical management, signing off; 4/11/24 head  CT: Stable CT head, Mixed acute on chronic right subdural hematoma with 0.8 cm midline  shift to the left. There is subfalcine herniation.  Referred By: Monique Lenz, APRN-CNP  Past Medical History Relevant  to Rehab: 89 y.o. Ghanaian speaking male with past medical history significant for dementia, CKD, DM, COPD w/ chronic resp failure (O2 dependent), paranoid schizophrenia, cardiomyopathy, anxiety/depression, HTN, HLD, BPH, and GERD who presented to [facility] ED 4/10 from [facility] s/p unwitnessed fall. Patient pan scanned with the following significant results: acute on chronic right frontoparietal SDH measuring up to 2.8cm as well as cerebral sulcal effacement, narrowing of the right lateral and 3rd ventricles and 5mm leftward midline shift.  Missed Visit: Yes  Missed Visit Reason:  (Acute on chronic right frontoparietal subdural hematoma, PT eval deferred await neurosurgery consult)  Family/Caregiver Present:  (Daughter present, supprotive, provides prior level of function)  Caregiver Feedback: Per conference w/ RN patient stable to participate in therapy  Co-Treatment: OT  Co-Treatment Reason: maximize pt safety & mobility  Patient Position Received: Bed, 2 rail up, Alarm on  General Comment: Pleasant & cooperative, language barrier, able to follow one step commands w/ demonstration & interpretation by daughter; mobility limited by decreased balance leans lt & posteriorly    Home Living:  Home Living  Home Living Comments: [facility] [employer] resident x~3months, per daughter was supposed to be receiving therapy but unsure if he has    Prior Level of Function:  Prior Function Per Pt/Caregiver Report  Prior Function Comments: mobility w/ sba using rollator, assist for toileting & adl    Precautions:  Precautions  Precautions Comment: fall, alarm, language-Ghanaian, elevate hob 30deg  Pain:  Pain Assessment  Pain Assessment:  (rt knee pain, per daughter pain is chronic)    Cognition:  Cognition  Overall Cognitive Status:  (daughter reports pt is oriented x1 at this time as at base line)  General Assessments:     Static Sitting Balance  Static Sitting-Balance Support:  (initially dependent at edge of bed,  progressed to mod assist w/ lean to lt & posteriorly)       Functional Assessments:     Bed Mobility  Bed Mobility:  (dependent x2 supine<>sit, max assist x2 rolling rt/lt for adjustment of linens; patient vomiting upon return to bed)  Transfers  Transfer:  (unable/unsafe due to balance deficit)   Extremity/Trunk Assessments:     RLE   RLE :  (guarded w/ c/o knee pain, attains ~70deg knee flexion in sitting, ankle df to neutral; strength: grossly 2+/5 w/ antigravity movement)  LLE   LLE :  (hip/knee wfl, ankle df to neutral; strength: grossly 2+/5 w/ antigravity movement)    Outcome Measures:  [facility] Basic Mobility  Turning from your back to your side while in a flat bed without using bedrails: A lot  Moving from lying on your back to sitting on the side of a flat bed without using bedrails: Total  Moving to and from bed to chair (including a wheelchair): Total  Standing up from a chair using your arms (e.g. wheelchair or bedside chair): Total  To walk in hospital room: Total  Climbing 3-5 steps with railing: Total  Basic Mobility - Total Score: 7   Goals:  Encounter Problems       Encounter Problems (Active)       PT Problem       bed mobility  (Progressing)       Start:  04/12/24       Mod assist x1 supine<>sit         transfers  (Progressing)       Start:  04/12/24       Mod assist x1 sit<>stand w/ use of appropriate assistive device         gait   (Progressing)       Start:  04/12/24       Mod assist x2 >=10ftx2 w/ use of appropriate assistive device          balance   (Progressing)       Start:  04/12/24       Cga maintaining sitting supported balance while performing >=10reps x2 sets ble therex to facilitate inc fxl mobility& gait stability              Education Documentation  Mobility Training, taught by Nena Dukes, PT at 4/12/2024 11:29 AM.  Learner: Family, Patient  Readiness: Acceptance  Method: Explanation  Response: Needs Reinforcement  Comment: activity progression, safety

## 2024-04-12 NOTE — PROGRESS NOTES
Met with wife at bedside. She reports her daughters are touring 2 other facilities today and will be up after to give us some choices.

## 2024-04-12 NOTE — PROGRESS NOTES
"Roman Lakhani is a 89 y.o. male on day 2 of admission presenting with SDH (subdural hematoma) (Multi).    Subjective   Patient awake and alert this am. Eating breakfast with assistance from nursing. Nursing denies any overnight issues. Patient will not lift his left arm off the bed or grasp an object but some movement noted from the left shoulder.        Objective     Physical Exam  Constitutional:       General: He is not in acute distress.  Cardiovascular:      Rate and Rhythm: Normal rate.   Pulmonary:      Effort: Pulmonary effort is normal.   Abdominal:      General: There is no distension.      Palpations: Abdomen is soft.      Tenderness: There is no abdominal tenderness.   Skin:     General: Skin is warm and dry.   Neurological:      Mental Status: He is alert. He is disoriented.      GCS: GCS eye subscore is 4. GCS verbal subscore is 4. GCS motor subscore is 6.      Motor: Weakness (LUE) present.         Last Recorded Vitals  Blood pressure 126/65, pulse 88, temperature 35.9 °C (96.6 °F), temperature source Temporal, resp. rate 16, height 1.676 m (5' 6\"), weight 81.6 kg (180 lb), SpO2 90%.  Intake/Output last 3 Shifts:  I/O last 3 completed shifts:  In: 200 (2.4 mL/kg) [P.O.:200]  Out: 200 (2.4 mL/kg) [Urine:200 (0.1 mL/kg/hr)]  Weight: 81.6 kg     Relevant Results           This patient currently has cardiac telemetry ordered; if you would like to modify or discontinue the telemetry order, click here to go to the orders activity to modify/discontinue the order.    Results for orders placed or performed during the hospital encounter of 04/10/24 (from the past 24 hour(s))   POCT GLUCOSE   Result Value Ref Range    POCT Glucose 118 (H) 74 - 99 mg/dL   POCT GLUCOSE   Result Value Ref Range    POCT Glucose 203 (H) 74 - 99 mg/dL   POCT GLUCOSE   Result Value Ref Range    POCT Glucose 191 (H) 74 - 99 mg/dL   CBC   Result Value Ref Range    WBC 10.8 4.4 - 11.3 x10*3/uL    nRBC 0.0 0.0 - 0.0 /100 WBCs    RBC " 3.89 (L) 4.50 - 5.90 x10*6/uL    Hemoglobin 11.7 (L) 13.5 - 17.5 g/dL    Hematocrit 35.4 (L) 41.0 - 52.0 %    MCV 91 80 - 100 fL    MCH 30.1 26.0 - 34.0 pg    MCHC 33.1 32.0 - 36.0 g/dL    RDW 14.2 11.5 - 14.5 %    Platelets 276 150 - 450 x10*3/uL   Comprehensive Metabolic Panel   Result Value Ref Range    Glucose 104 (H) 74 - 99 mg/dL    Sodium 135 (L) 136 - 145 mmol/L    Potassium 4.0 3.5 - 5.3 mmol/L    Chloride 101 98 - 107 mmol/L    Bicarbonate 27 21 - 32 mmol/L    Anion Gap 11 10 - 20 mmol/L    Urea Nitrogen 15 6 - 23 mg/dL    Creatinine 1.08 0.50 - 1.30 mg/dL    eGFR 64 >60 mL/min/1.73m*2    Calcium 8.9 8.6 - 10.3 mg/dL    Albumin 3.8 3.4 - 5.0 g/dL    Alkaline Phosphatase 77 33 - 136 U/L    Total Protein 6.4 6.4 - 8.2 g/dL    AST 41 (H) 9 - 39 U/L    Bilirubin, Total 1.0 0.0 - 1.2 mg/dL    ALT 16 10 - 52 U/L   SST TOP   Result Value Ref Range    Extra Tube Hold for add-ons.    POCT GLUCOSE   Result Value Ref Range    POCT Glucose 102 (H) 74 - 99 mg/dL     XR shoulder left 2+ views    Result Date: 4/11/2024  Interpreted By:  Nishant Lim, STUDY: XR SHOULDER LEFT 2+ VIEWS; 4/11/2024 1:43 pm   INDICATION: Signs/Symptoms:Abrasion, unwitnessed fall, new LUE weakness- rule out trauma injury.   COMPARISON: None.   ACCESSION NUMBER(S): [id number]   ORDERING CLINICIAN: EZEKIEL RODRIGUEZ   FINDINGS: There is no acute fracture or dislocation. Degenerative changes of the glenohumeral articulation. Moderate acromioclavicular degenerative change. The visualized ribs are intact. The visualized is clear.       1. No acute fracture or major dislocation. 2. Moderate right shoulder joint degenerative changes.     Signed by: Nishant Lim 4/11/2024 2:03 PM Dictation workstation:   [id number]    CT head wo IV contrast    Result Date: 4/11/2024  Interpreted By:  Alfreda Yoon, STUDY: CT HEAD WO IV CONTRAST;  4/11/2024 9:39 am   INDICATION: Signs/Symptoms:LUE flaccid- new neuro change- known head bleed with shift.   COMPARISON:  04/10/2024   ACCESSION NUMBER(S): [id number]   ORDERING CLINICIAN: EZEKIEL RODRIGUEZ   TECHNIQUE: Examination was performed in the axial plane with sagittal and coronal reconstructions. Bone and soft tissue algorithms were performed.   FINDINGS: INTRACRANIAL: There is a right subdural hematoma measuring 2.8 cm in transverse dimension unchanged in size. This is predominantly hyperdense with layering blood consistent with acute and chronic subdural hematoma. There is unchanged 0.8 cm midline shift to the left. There is mass effect and compression on the right lateral ventricle. There is subfalcine herniation. There is underlying cerebral atrophy.   EXTRACRANIAL: Visualized paranasal sinuses and mastoids are clear.       1. Stable CT head. 2. Mixed acute on chronic right subdural hematoma with 0.8 cm midline shift to the left. There is subfalcine herniation.   MACRO: None   Signed by: Alfreda Yoon 4/11/2024 9:59 AM Dictation workstation:   [id number]    CT head wo IV contrast    Result Date: 4/10/2024  Interpreted By:  Mavis Brantley, STUDY: CT HEAD WO IV CONTRAST;  4/10/2024 12:53 pm   INDICATION: Signs/Symptoms:6 hr follow up.   COMPARISON: 5:44 a.m. the same day   ACCESSION NUMBER(S): [id number]   ORDERING CLINICIAN: LANDRY ALVES   TECHNIQUE: Unenhanced CT images of the head were obtained.   FINDINGS: There is again a large mixed density right hemispheric subdural collection with hematocrit layer posteriorly. This measures up to 2.8 cm in thickness over the superior frontal lobe. There is similar associated mass effect including effacement of the underlying cerebral sulci and right lateral ventricle and midline shift of approximately 5 mm at the level of the foramina of [place]. The left lateral ventricle remains moderately dilated.   No focal calvarial lesion.   The visualized paranasal sinuses are clear. Punctate dense possible surgical material along the anterior margin of each globe again seen.        Redemonstration of large right hemispheric subdural hematoma with associated mass effect including approximately 5 mm of midline shift. No significant change from 5:44 a.m. the same day.   MACRO: None.   Signed by: Mavis Brantley 4/10/2024 1:14 PM Dictation workstation:   [id number]                Assessment/Plan   Principal Problem:    SDH (subdural hematoma) (Multi)    Roman Lakhani is a 89 y.o. [place]ian speaking male with a past medical history significant for dementia, CKD, IDDM Type 2, COPD w/ chronic respiratory failure (O2 dependent), paranoid schizophrenia, cardiomyopathy, anxiety/depression, HTN, HLD, BPH, and GERD who presented to [facility] ED 4/10 from [facility] s/p unwitnessed fall. Patient pan scanned with the following significant results: acute on chronic right frontoparietal SDH measuring up to 2.8cm as well as cerebral sulcal effacement, narrowing of the right lateral and 3rd ventricles and 5mm leftward midline shift. Patient admitted to trauma service with consult to NSGY for continued management.      List of clinically significant injuries/problems:  Acute on chronic 2.8 cm SDH with 5 mm leftward midline shift     -> repeat CT head 4/11 with 8 mm midline shift to the left with subfalcine herniation  Small right knee effusion with severe tricompartmental degenerative changes  Leukocytosis - resolved  Hyperglycemia - resolved   Bed bugs (Confirmed in ED)     Assessment/Plan:     # SDH  # Flaccid LUE   - NSGY consulted, appreciate input     -> not a surgical candidate given age and comorbidities  - Palliative care consulted, appreciate input  - Q4h neuro checks  - maintain HOB >30 degrees  - maintain SBP <160  - NO anticoagulation or antiplatelet medications. Avoid NSAIDs  - avoid narcotics if possible given hx dementia with TBI d/t concern for development of delirium  - acetaminophen PRN for pain  - PT/OT     # Dementia  # IDDM Type 2  # CKD  # COPD  # Chronic resp failure  (O2 dependent)  # HTN  # HLD  - medicine consulted (Dr. Miranda)   - ISS while inpatient  - continue home meds as appropriate      # Bed bugs  - contact precautions     # DVT ppx  - SCD's only     Dispo: Patient medically ready for DC. Pending PT/OT eval. Likely DC to LTC.     Patient discussed with Dr. Pickens       I spent 15 minutes in the professional and overall care of this patient.      Monique Lenz, APRPRO-CNP

## 2024-04-12 NOTE — CONSULTS
Inpatient consult to Palliative Care  Consult performed by: JETT Hector-CNP  Consult ordered by: JETT Dugan-ALLI          Reason For Consult  Reason for Consult: communication / medical decision making     History Of Present Illness  Roman Lakhani is a 89 y.o. male with past medical history of  dementia, cardiomyopathy, COPD with chronic respiratory failure and oxygen dependency, CKD, diabetes mellitus, hypertension, hyperlipidemia, paranoid schizophrenia, GERD, BPH, and anxiety/depression  is presenting from ECF with unwitnessed fall.  Significant findings in the ED evidenced in acute on chronic right frontoparietal subdural hematoma measuring up to 2.8 cm as well as cerebral sulcal effacement and narrowing of the right lateral and third ventricles, as well as a leftward midline shift.  Neurosurgery was consulted, and the patient was admitted under the trauma service with subdural hematoma.  Further evaluation by the neurosurgery team evidenced recommendation against surgical intervention due to the patient's age and underlying comorbidities, and so conservative measures were recommended.  Palliative care was consulted for symptom management and to discuss goals of care and advance care planning.    Upon assessment of the patient this morning, the patient was being assisted with breakfast.  The patient is primarily [place]-speaking, but did evidence he is having pain to bilateral shoulders, which is improved with repositioning.  No family was present during my initial assessment.    ESAS ([place] Symptom Assessment System): Unable to fully assess due to the patient's current mentation.    PPS (Palliative Prognostic Scale): 50 to 60%    PMH: as above       Personal/Social History  The patient previously lives in an ECF.  He has no history on file for tobacco use, alcohol use, and drug use.    Past Medical History  He has no past medical history on file.    Surgical History  He has no past  surgical history on file.     Family History  No family history on file.  Allergies  Patient has no known allergies.    Review of Systems   Reason unable to perform ROS: Mentation.        Physical Exam  Constitutional:       General: He is not in acute distress.     Appearance: He is normal weight. He is ill-appearing. He is not toxic-appearing or diaphoretic.      Comments: Awake, pleasant, confused   HENT:      Head: Normocephalic and atraumatic.      Nose: Nose normal.      Mouth/Throat:      Mouth: Mucous membranes are moist.      Comments: Slight food exudate from recent breakfast  Eyes:      Pupils: Pupils are equal, round, and reactive to light.   Neck:      Comments: The patient does describe slight pain with neck movement  Cardiovascular:      Rate and Rhythm: Normal rate and regular rhythm.      Pulses: Normal pulses.      Heart sounds: Normal heart sounds.   Pulmonary:      Effort: Pulmonary effort is normal. No respiratory distress.      Breath sounds: Normal breath sounds.   Abdominal:      General: Bowel sounds are normal. There is distension.      Palpations: Abdomen is soft.      Tenderness: There is no abdominal tenderness.      Comments: Mild distention due to body habitus   Genitourinary:     Comments: External urinary catheter in place  Musculoskeletal:         General: No deformity.      Cervical back: Neck supple. Tenderness present.      Right lower leg: Edema present.      Left lower leg: Edema present.      Comments: Mild bilateral lower extremity edema noted, and tenderness to palpation of bilateral lower extremities; the patient does describe tenderness to bilateral shoulders and neck area, more so with palpation, but relieved with repositioning   Skin:     General: Skin is warm and dry.      Coloration: Skin is pale.   Neurological:      Mental Status: He is alert. Mental status is at baseline. He is disoriented.      Motor: Weakness present.      Comments: A&O x 1-2.  Conversant with  "clear speech and able to participate in conversation.  Able to follow simple commands.  2/5 right grasp with 1/5 left grasp, and does not lift either arm but does move them laterally in bed. 2/5 bilateral lower extremity strength, mostly moving legs slightly laterally in bed.   Psychiatric:         Mood and Affect: Mood normal.         Behavior: Behavior normal.         Thought Content: Thought content normal.      Comments: Without restlessness         Last Recorded Vitals  Blood pressure 126/65, pulse 88, temperature 36.2 °C (97.2 °F), resp. rate 18, height 1.676 m (5' 6\"), weight 81.6 kg (180 lb), SpO2 90%.    Relevant Results  Scheduled medications  atorvastatin, 40 mg, oral, Daily  donepezil, 10 mg, oral, BID  famotidine, 20 mg, oral, Daily  insulin glargine, 17 Units, subcutaneous, q24h  insulin regular, 0-10 Units, subcutaneous, TID with meals  levothyroxine, 150 mcg, oral, Daily before breakfast  lisinopril, 5 mg, oral, Daily  pantoprazole, 20 mg, oral, Daily before breakfast  polyethylene glycol, 17 g, oral, Daily  spironolactone, 25 mg, oral, Daily  tamsulosin, 0.4 mg, oral, Daily  triamcinolone, , Topical, BID      Continuous medications     PRN medications  PRN medications: acetaminophen, dextrose, dextrose, glucagon, glucagon, hydrALAZINE, ipratropium-albuteroL, ondansetron    Results for orders placed or performed during the hospital encounter of 04/10/24 (from the past 96 hour(s))   CBC and Auto Differential   Result Value Ref Range    WBC 12.3 (H) 4.4 - 11.3 x10*3/uL    nRBC 0.0 0.0 - 0.0 /100 WBCs    RBC 3.97 (L) 4.50 - 5.90 x10*6/uL    Hemoglobin 11.8 (L) 13.5 - 17.5 g/dL    Hematocrit 36.4 (L) 41.0 - 52.0 %    MCV 92 80 - 100 fL    MCH 29.7 26.0 - 34.0 pg    MCHC 32.4 32.0 - 36.0 g/dL    RDW 14.0 11.5 - 14.5 %    Platelets 270 150 - 450 x10*3/uL    Neutrophils % 80.6 40.0 - 80.0 %    Immature Granulocytes %, Automated 0.2 0.0 - 0.9 %    Lymphocytes % 12.0 13.0 - 44.0 %    Monocytes % 5.9 2.0 - " 10.0 %    Eosinophils % 0.9 0.0 - 6.0 %    Basophils % 0.4 0.0 - 2.0 %    Neutrophils Absolute 9.88 (H) 1.60 - 5.50 x10*3/uL    Immature Granulocytes Absolute, Automated 0.03 0.00 - 0.50 x10*3/uL    Lymphocytes Absolute 1.47 0.80 - 3.00 x10*3/uL    Monocytes Absolute 0.73 0.05 - 0.80 x10*3/uL    Eosinophils Absolute 0.11 0.00 - 0.40 x10*3/uL    Basophils Absolute 0.05 0.00 - 0.10 x10*3/uL   Comprehensive metabolic panel   Result Value Ref Range    Glucose 179 (H) 74 - 99 mg/dL    Sodium 133 (L) 136 - 145 mmol/L    Potassium 4.7 3.5 - 5.3 mmol/L    Chloride 98 98 - 107 mmol/L    Bicarbonate 23 21 - 32 mmol/L    Anion Gap 17 10 - 20 mmol/L    Urea Nitrogen 16 6 - 23 mg/dL    Creatinine 1.17 0.50 - 1.30 mg/dL    eGFR 58 (L) >60 mL/min/1.73m*2    Calcium 9.5 8.6 - 10.3 mg/dL    Albumin 4.3 3.4 - 5.0 g/dL    Alkaline Phosphatase 86 33 - 136 U/L    Total Protein 7.0 6.4 - 8.2 g/dL    AST 23 9 - 39 U/L    Bilirubin, Total 1.5 (H) 0.0 - 1.2 mg/dL    ALT 12 10 - 52 U/L   aPTT   Result Value Ref Range    aPTT 30 27 - 38 seconds   Protime-INR   Result Value Ref Range    Protime 11.7 9.8 - 12.8 seconds    INR 1.0 0.9 - 1.1   Type and Screen   Result Value Ref Range    ABO TYPE O     Rh TYPE POS     ANTIBODY SCREEN NEG    POCT GLUCOSE   Result Value Ref Range    POCT Glucose 138 (H) 74 - 99 mg/dL   POCT GLUCOSE   Result Value Ref Range    POCT Glucose 142 (H) 74 - 99 mg/dL   POCT GLUCOSE   Result Value Ref Range    POCT Glucose 151 (H) 74 - 99 mg/dL   Comprehensive Metabolic Panel   Result Value Ref Range    Glucose 125 (H) 74 - 99 mg/dL    Sodium 134 (L) 136 - 145 mmol/L    Potassium 4.1 3.5 - 5.3 mmol/L    Chloride 101 98 - 107 mmol/L    Bicarbonate 25 21 - 32 mmol/L    Anion Gap 12 10 - 20 mmol/L    Urea Nitrogen 18 6 - 23 mg/dL    Creatinine 1.09 0.50 - 1.30 mg/dL    eGFR 64 >60 mL/min/1.73m*2    Calcium 8.8 8.6 - 10.3 mg/dL    Albumin 3.7 3.4 - 5.0 g/dL    Alkaline Phosphatase 77 33 - 136 U/L    Total Protein 6.1 (L) 6.4 -  8.2 g/dL    AST 49 (H) 9 - 39 U/L    Bilirubin, Total 1.5 (H) 0.0 - 1.2 mg/dL    ALT 16 10 - 52 U/L   CBC   Result Value Ref Range    WBC 9.0 4.4 - 11.3 x10*3/uL    nRBC 0.0 0.0 - 0.0 /100 WBCs    RBC 3.93 (L) 4.50 - 5.90 x10*6/uL    Hemoglobin 11.4 (L) 13.5 - 17.5 g/dL    Hematocrit 35.8 (L) 41.0 - 52.0 %    MCV 91 80 - 100 fL    MCH 29.0 26.0 - 34.0 pg    MCHC 31.8 (L) 32.0 - 36.0 g/dL    RDW 14.1 11.5 - 14.5 %    Platelets 258 150 - 450 x10*3/uL   POCT GLUCOSE   Result Value Ref Range    POCT Glucose 118 (H) 74 - 99 mg/dL   SST TOP   Result Value Ref Range    Extra Tube Hold for add-ons.    POCT GLUCOSE   Result Value Ref Range    POCT Glucose 118 (H) 74 - 99 mg/dL   POCT GLUCOSE   Result Value Ref Range    POCT Glucose 203 (H) 74 - 99 mg/dL   POCT GLUCOSE   Result Value Ref Range    POCT Glucose 191 (H) 74 - 99 mg/dL   CBC   Result Value Ref Range    WBC 10.8 4.4 - 11.3 x10*3/uL    nRBC 0.0 0.0 - 0.0 /100 WBCs    RBC 3.89 (L) 4.50 - 5.90 x10*6/uL    Hemoglobin 11.7 (L) 13.5 - 17.5 g/dL    Hematocrit 35.4 (L) 41.0 - 52.0 %    MCV 91 80 - 100 fL    MCH 30.1 26.0 - 34.0 pg    MCHC 33.1 32.0 - 36.0 g/dL    RDW 14.2 11.5 - 14.5 %    Platelets 276 150 - 450 x10*3/uL   Comprehensive Metabolic Panel   Result Value Ref Range    Glucose 104 (H) 74 - 99 mg/dL    Sodium 135 (L) 136 - 145 mmol/L    Potassium 4.0 3.5 - 5.3 mmol/L    Chloride 101 98 - 107 mmol/L    Bicarbonate 27 21 - 32 mmol/L    Anion Gap 11 10 - 20 mmol/L    Urea Nitrogen 15 6 - 23 mg/dL    Creatinine 1.08 0.50 - 1.30 mg/dL    eGFR 64 >60 mL/min/1.73m*2    Calcium 8.9 8.6 - 10.3 mg/dL    Albumin 3.8 3.4 - 5.0 g/dL    Alkaline Phosphatase 77 33 - 136 U/L    Total Protein 6.4 6.4 - 8.2 g/dL    AST 41 (H) 9 - 39 U/L    Bilirubin, Total 1.0 0.0 - 1.2 mg/dL    ALT 16 10 - 52 U/L   SST TOP   Result Value Ref Range    Extra Tube Hold for add-ons.    POCT GLUCOSE   Result Value Ref Range    POCT Glucose 102 (H) 74 - 99 mg/dL     XR shoulder left 2+ views    Result  Date: 4/11/2024  Interpreted By:  Nishant Lim, STUDY: XR SHOULDER LEFT 2+ VIEWS; 4/11/2024 1:43 pm   INDICATION: Signs/Symptoms:Abrasion, unwitnessed fall, new LUE weakness- rule out trauma injury.   COMPARISON: None.   ACCESSION NUMBER(S): [id number]   ORDERING CLINICIAN: EZEKIEL RODRIGUEZ   FINDINGS: There is no acute fracture or dislocation. Degenerative changes of the glenohumeral articulation. Moderate acromioclavicular degenerative change. The visualized ribs are intact. The visualized is clear.       1. No acute fracture or major dislocation. 2. Moderate right shoulder joint degenerative changes.     Signed by: Nishant Lim 4/11/2024 2:03 PM Dictation workstation:   [id number]    CT head wo IV contrast    Result Date: 4/11/2024  Interpreted By:  Alfreda Yoon, STUDY: CT HEAD WO IV CONTRAST;  4/11/2024 9:39 am   INDICATION: Signs/Symptoms:LUE flaccid- new neuro change- known head bleed with shift.   COMPARISON: 04/10/2024   ACCESSION NUMBER(S): [id number]   ORDERING CLINICIAN: EZEKIEL RODRIGUEZ   TECHNIQUE: Examination was performed in the axial plane with sagittal and coronal reconstructions. Bone and soft tissue algorithms were performed.   FINDINGS: INTRACRANIAL: There is a right subdural hematoma measuring 2.8 cm in transverse dimension unchanged in size. This is predominantly hyperdense with layering blood consistent with acute and chronic subdural hematoma. There is unchanged 0.8 cm midline shift to the left. There is mass effect and compression on the right lateral ventricle. There is subfalcine herniation. There is underlying cerebral atrophy.   EXTRACRANIAL: Visualized paranasal sinuses and mastoids are clear.       1. Stable CT head. 2. Mixed acute on chronic right subdural hematoma with 0.8 cm midline shift to the left. There is subfalcine herniation.   MACRO: None   Signed by: Alfreda Yoon 4/11/2024 9:59 AM Dictation workstation:   [id number]    CT head wo IV contrast    Result Date:  4/10/2024  Interpreted By:  Mavis Brantley, STUDY: CT HEAD WO IV CONTRAST;  4/10/2024 12:53 pm   INDICATION: Signs/Symptoms:6 hr follow up.   COMPARISON: 5:44 a.m. the same day   ACCESSION NUMBER(S): [id number]   ORDERING CLINICIAN: LANDRY ALVES   TECHNIQUE: Unenhanced CT images of the head were obtained.   FINDINGS: There is again a large mixed density right hemispheric subdural collection with hematocrit layer posteriorly. This measures up to 2.8 cm in thickness over the superior frontal lobe. There is similar associated mass effect including effacement of the underlying cerebral sulci and right lateral ventricle and midline shift of approximately 5 mm at the level of the foramina of Monro. The left lateral ventricle remains moderately dilated.   No focal calvarial lesion.   The visualized paranasal sinuses are clear. Punctate dense possible surgical material along the anterior margin of each globe again seen.       Redemonstration of large right hemispheric subdural hematoma with associated mass effect including approximately 5 mm of midline shift. No significant change from 5:44 a.m. the same day.   MACRO: None.   Signed by: Mavis Brantley 4/10/2024 1:14 PM Dictation workstation:   [id number]    XR femur right 2+ views    Result Date: 4/10/2024  STUDY: Femur Radiographs; 4/10/2024 6:12 AM INDICATION: Trauma. COMPARISON: None Available. ACCESSION NUMBER(S): [id number] ORDERING CLINICIAN: IRVING BECERRA TECHNIQUE:  Four view(s) of the right femur. FINDINGS:  Right-sided superior and inferior pubic rami and ischium normal. No fracture. The iliopectineal line on the right is normal. The femoral head is localized normally in the acetabular fossa. Femoral head appears to be normal. Femoral neck and intertrochanteric region appear to be normal. No fracture or trabecular line disruption. The femoral shaft appears to be normal. There are vessel calcifications identified. Small knee effusion suspected. Patellofemoral,  medial and lateral compartment severe degenerative changes are identified. No definite evidence of fracture involving the distal femoral shaft or the proximal tibial plateau or proximal fibula in their limited visualized extent.    No fracture or subluxation of the right femur. Severe tricompartmental degenerative changes of the right knee. Small knee effusion suspected. Signed by Godwin Riggs MD    XR pelvis 1-2 views    Result Date: 4/10/2024  STUDY: Pelvis Radiographs; 4/10/2024 6:12 AM INDICATION: Trauma. COMPARISON: None Available. ACCESSION NUMBER(S): [id number] ORDERING CLINICIAN: IRVING BECERRA TECHNIQUE:  One view(s) of the pelvis. FINDINGS:  Lower lumbar spine degenerative changes. Iliac wings normal with esthesiopathic changes. SI joint show degenerative changes. Bilateral hip DJD.  Superior and inferior pubic rami normal. Both hips appear to be localized normally in the acetabulum. Intertrochanteric region normal. Femoral head normal. Femoral neck appears normal. No distinct evidence of fracture identified trabecular lines appear to be normal. Proximal femoral shafts appear normal. Visualized portions of the pelvis remarkable for phleboliths.    Degenerative changes of the lower lumbar spine, hips  and SI joints. No evidence of fracture or subluxation. Signed by Godwin Riggs MD    XR chest 1 view    Result Date: 4/10/2024  STUDY: Chest Radiograph;  4/10/2024 6:12 AM INDICATION: Cough. COMPARISON: None Available. ACCESSION NUMBER(S): [id number] ORDERING CLINICIAN: IRVING BECERRA TECHNIQUE:  Frontal chest was obtained at 06:12 hours. FINDINGS: CARDIOMEDIASTINAL SILHOUETTE: Cardiomediastinal silhouette is normal in size and configuration. Calcified mediastinal lymph nodes. Airway normal.  LUNGS: Lungs are clear. Basilar atelectasis and scarring. No pneumothorax. No focal nodule  ABDOMEN: No remarkable upper abdominal findings. No free air.  BONES: No acute osseous changes. Degenerative changes  of both shoulders and thoracic spine. No focal rib abnormality is identified.    Normal size heart. Evidence of prior granulomatous disease. Basilar atelectatic changes. No focal infiltrate. Signed by Godwin Riggs MD    CT head wo IV contrast    Result Date: 4/10/2024  Interpreted By:  Arnulfo Moncada, STUDY: CT HEAD WO IV CONTRAST; CT CERVICAL SPINE WO IV CONTRAST; ; 4/10/2024 6:00 am   INDICATION: Signs/Symptoms:trauma.   COMPARISON: None.   ACCESSION NUMBER(S): [id number]; [id number]   ORDERING CLINICIAN: IRVING BECERRA   TECHNIQUE: Noncontrast CT exams of the head and cervical spine with multiplanar reformations.   FINDINGS: BRAIN PARENCHYMA: Gray-white matter interfaces are preserved. 5 mm of leftward midline shift at the level of foramen of Monro.   HEMORRHAGE: There is an acute on chronic right frontoparietal subdural hematoma measuring up to 2.8 cm in maximal thickness along the anterolateral frontal convexity. VENTRICLES and EXTRA-AXIAL SPACES: There is cerebral sulcal effacement. Asymmetric narrowing of the right lateral ventricle and narrowing of the 3rd ventricle. No definite hydrocephalus. EXTRACRANIAL SOFT TISSUES: Small right posterior scalp hematoma. PARANASAL SINUSES/MASTOIDS: The visualized paranasal sinuses and mastoid air cells are aerated. CALVARIUM: No depressed skull fracture. No destructive osseous lesion.   OTHER FINDINGS: None.   CERVICAL SPINE:   Mild reversal of the normal cervical lordosis could reflect positioning or muscle spasm. ALIGNMENT: Grade 1 degenerative anterolisthesis at C2-C3 and C3-C4. Trace degenerative anterolisthesis at C7-T1. Alignment is otherwise maintained. VERTEBRAE: No acute fracture is seen. Vertebral stature is maintained. Degenerative endplate osteophytosis and uncovertebral hypertrophy in conjunction with moderate to severe degenerative disc height loss, overall worst at C7-T1. Moderate to severe multilevel hypertrophic facet osteoarthropathy. SPINAL CANAL:  No critical spinal canal stenosis. PREVERTEBRAL SOFT TISSUES: No prevertebral soft tissue swelling. LUNG APICES: Imaged portion of the lung apices are within normal limits.   OTHER FINDINGS: None.       There is an acute on chronic right frontoparietal subdural hematoma measuring up to 2.8 cm in maximal thickness along the anterolateral frontal convexity. There is cerebral sulcal effacement, narrowing of the right lateral and 3rd ventricles and 5 mm of leftward midline shift. Neurosurgical evaluation recommended.   No acute cervical spine fracture or malalignment. Severe spondylosis.     MACRO: Arnulfo Moncada discussed the significance and urgency of this critical finding by epic secure chat with  IRVING BECERRA on 4/10/2024 at 6:25 am.  (**-RCF-**) Findings:  See findings.     Signed by: Arnulfo Moncada 4/10/2024 6:25 AM Dictation workstation:   [id number]    CT cervical spine wo IV contrast    Result Date: 4/10/2024  Interpreted By:  Arnulfo Moncada, STUDY: CT HEAD WO IV CONTRAST; CT CERVICAL SPINE WO IV CONTRAST; ; 4/10/2024 6:00 am   INDICATION: Signs/Symptoms:trauma.   COMPARISON: None.   ACCESSION NUMBER(S): [id number]; [id number]   ORDERING CLINICIAN: IRVING BECERRA   TECHNIQUE: Noncontrast CT exams of the head and cervical spine with multiplanar reformations.   FINDINGS: BRAIN PARENCHYMA: Gray-white matter interfaces are preserved. 5 mm of leftward midline shift at the level of foramen of Monro.   HEMORRHAGE: There is an acute on chronic right frontoparietal subdural hematoma measuring up to 2.8 cm in maximal thickness along the anterolateral frontal convexity. VENTRICLES and EXTRA-AXIAL SPACES: There is cerebral sulcal effacement. Asymmetric narrowing of the right lateral ventricle and narrowing of the 3rd ventricle. No definite hydrocephalus. EXTRACRANIAL SOFT TISSUES: Small right posterior scalp hematoma. PARANASAL SINUSES/MASTOIDS: The visualized paranasal sinuses and mastoid air cells are aerated. CALVARIUM:  No depressed skull fracture. No destructive osseous lesion.   OTHER FINDINGS: None.   CERVICAL SPINE:   Mild reversal of the normal cervical lordosis could reflect positioning or muscle spasm. ALIGNMENT: Grade 1 degenerative anterolisthesis at C2-C3 and C3-C4. Trace degenerative anterolisthesis at C7-T1. Alignment is otherwise maintained. VERTEBRAE: No acute fracture is seen. Vertebral stature is maintained. Degenerative endplate osteophytosis and uncovertebral hypertrophy in conjunction with moderate to severe degenerative disc height loss, overall worst at C7-T1. Moderate to severe multilevel hypertrophic facet osteoarthropathy. SPINAL CANAL: No critical spinal canal stenosis. PREVERTEBRAL SOFT TISSUES: No prevertebral soft tissue swelling. LUNG APICES: Imaged portion of the lung apices are within normal limits.   OTHER FINDINGS: None.       There is an acute on chronic right frontoparietal subdural hematoma measuring up to 2.8 cm in maximal thickness along the anterolateral frontal convexity. There is cerebral sulcal effacement, narrowing of the right lateral and 3rd ventricles and 5 mm of leftward midline shift. Neurosurgical evaluation recommended.   No acute cervical spine fracture or malalignment. Severe spondylosis.     MACRO: Arnulfo Moncada discussed the significance and urgency of this critical finding by epic secure chat with  IRVING BECERRA on 4/10/2024 at 6:25 am.  (**-RCF-**) Findings:  See findings.     Signed by: Arnulfo Moncada 4/10/2024 6:25 AM Dictation workstation:   [id number]        Assessment/Plan   IMP:  Roman Lakhani is a 89 y.o. male with past medical history of dementia, cardiomyopathy, COPD with chronic respiratory failure and oxygen dependency, CKD, diabetes mellitus, hypertension, hyperlipidemia, paranoid schizophrenia, GERD, BPH, and anxiety/depression is presenting from [facility] with unwitnessed fall.  Significant findings in the ED evidenced in acute on chronic right frontoparietal  subdural hematoma measuring up to 2.8 cm as well as cerebral sulcal effacement and narrowing of the right lateral and third ventricles, as well as a leftward midline shift.  Neurosurgery was consulted, and the patient was admitted under the trauma service with subdural hematoma.  Further evaluation by the neurosurgery team evidenced recommendation against surgical intervention due to the patient's age and underlying comorbidities, and so conservative measures were recommended.  Palliative care was consulted for symptom management and to discuss goals of care and advance care planning.    4/12/2024-   The patient did not appear to be in any obvious distress, and does seem to have improvement of his bilateral shoulder and neck discomfort with repositioning, and so no additional medications will be recommended per our service at this time.  The patient does have as needed Tylenol available, and I did discuss this with his nurse, recommending giving the patient a dose of Tylenol this morning to help alleviate his aches.  The patient does have active DNR CCA CODE STATUS, which is appropriate for his clinical status and age.  We will plan to speak with the patient's family today, and it does appear as though they are going to continue supportive care with placement to a new ECF; the patient would likely benefit from continued conservative measures and PT/OT for strengthening as appropriate.  We would recommend outpatient palliative care to follow for ongoing symptom management as well as ongoing goals of care discussions.  Palliative care will continue to follow.    Thank you for allowing us to participate in the care of this gentleman.  Should you have any further questions or concerns regarding his care, please do not hesitate to contact us via [employer] (Jess Valenzuela during weekdays work hours and Mariano Fuller during weekdays after hours and over the weekend)    (This note was generated with voice recognition  software and may contain errors including spelling, grammar, syntax and misrecognition of what was dictated, that are not fully corrected)        Patient/proxy preference for information  Prefers full information    Goals of Care  The patient is DNR CCA CODE STATUS.  Plan of care at this time is supportive.      I spent 60 minutes in the professional and overall care of this patient.      Jess Valenzuela, APRN-CNP

## 2024-04-12 NOTE — TRANSFER OF CARE
Patient has no further trauma surgical needs. SDH deemed medically futile, no further imaging indicated per NSGY. Discharge planning in process. Patient discussed with Dr. Miranda who has agreed to take over as primary pending discharge to SNF.    Trauma will sign off, please call with questions.

## 2024-04-12 NOTE — DISCHARGE SUMMARY
Roman Lakhani is a 89 y.o. male on day 2 of admission presenting with SDH (subdural hematoma) (Multi).      Subjective   Patient fully evaluated on April 12.  Clinically unchanged.  Subdural hematoma results noted and no surgical intervention planned for now.  Continue supportive care.       Objective     Last Recorded Vitals  /63   Pulse 56   Temp 36.3 °C (97.3 °F)   Resp 16   Wt 81.6 kg (180 lb)   SpO2 93%   Intake/Output last 3 Shifts:    Intake/Output Summary (Last 24 hours) at 4/12/2024 1527  Last data filed at 4/11/2024 1723  Gross per 24 hour   Intake --   Output 100 ml   Net -100 ml       Admission Weight  Weight: 81.6 kg (180 lb) (04/10/24 0452)    Daily Weight  04/10/24 : 81.6 kg (180 lb)    Image Results  XR chest 1 view  Narrative: Interpreted By:  Alfreda Yoon,   STUDY:  XR CHEST 1 VIEW;  4/12/2024 1:38 pm      INDICATION:  Signs/Symptoms:? aspiration.      COMPARISON:  04/10/2024      ACCESSION NUMBER(S):  [id number]      ORDERING CLINICIAN:  GERHARD DOWLING      FINDINGS:  CARDIOMEDIASTINAL SILHOUETTE:  Cardiomediastinal silhouette is normal in size and configuration.          LUNGS:  Lungs are clear.      ABDOMEN:  No remarkable upper abdominal findings.          BONES:  No acute osseous changes.      Impression: No acute cardiopulmonary process.      MACRO:  None      Signed by: Alfreda Yoon 4/12/2024 3:15 PM  Dictation workstation:   [id number]      Physical Exam  Physical Exam  Vitals and nursing note reviewed.   Constitutional:       General: He is not in acute distress.     Appearance: He is well-developed.   HENT:      Head: Normocephalic and atraumatic.   Eyes:      Conjunctiva/sclera: Conjunctivae normal.   Cardiovascular:      Rate and Rhythm: Normal rate and regular rhythm.      Heart sounds: No murmur heard.  Pulmonary:      Effort: Pulmonary effort is normal. No respiratory distress.      Breath sounds: Normal breath sounds.   Abdominal:      Palpations:  Abdomen is soft.      Tenderness: There is no abdominal tenderness.   Musculoskeletal:         General: No swelling.      Cervical back: Neck supple. No tenderness.      Comments: Tenderness palpation proximal femur.  Appears to have mild pain with movement of the right hip.  No right knee or ankle pain.  He examination including palpation and range of motion of 3 other extremities does not elicit any pain.   Skin:     General: Skin is warm and dry.      Capillary Refill: Capillary refill takes less than 2 seconds.   Neurological:      Mental Status: He is alert.   Psychiatric:         Mood and Affect: Mood normal.         Assessment/Plan   This patient currently has cardiac telemetry ordered; if you would like to modify or discontinue the telemetry order, click here to go to the orders activity to modify/discontinue the order.              Principal Problem:    SDH (subdural hematoma) (Multi)        Timo Miranda MD  Physician  Internal Medicine     Consults      Signed     Date of Service: 4/10/2024 12:58 PM     Signed       Expand All Collapse All    Consults     Reason For Consult  Patient fully evaluated on April 10.     History Of Present Illness  Roman Lakhani is a 89 y.o. male presenting with with fall at his skilled nursing with subdural hematoma..     Past Medical History  He has no past medical history on file.     Surgical History  He has no past surgical history on file.     Social History  He has no history on file for tobacco use, alcohol use, and drug use.     Family History  Family History   No family history on file.        Allergies  Patient has no known allergies.     Review of Systems        Physical Exam           Last Recorded Vitals  /74   Pulse 79   Temp 36.3 °C (97.3 °F)   Resp 18   Wt 81.6 kg (180 lb)   SpO2 94%      Relevant Results           Assessment/Plan            Date of Service: 4/10/2024  7:36 AM           Attested Addendum        Expand All Collapse  All    History Of Present Illness  Roman Lakhani is a 89 y.o. Sammarinese speaking male with past medical history significant for dementia, CKD, DM, COPD w/ chronic resp failure (O2 dependent), paranoid schizophrenia, cardiomyopathy, anxiety/depression, HTN, HLD, BPH, and GERD who presented to [facility] ED 4/10 from [facility] s/p unwitnessed fall. Majority of history collected from chart and staff d/t dementia and language barrier.      In the ED, patient hemodynamically stable, afebrile. Labs remarkable for WBC 12.3, Na 13, glucose 179. Patient pan scanned with the following significant results: acute on chronic right frontoparietal SDH measuring up to 2.8cm as well as cerebral sulcal effacement, narrowing of the right lateral and 3rd ventricles and 5mm leftward midline shift. ED physician discussed patient with NSGY with recommendations for nonop management, ok for patient to remain at [facility]. Patient admitted to trauma service with consult to NSGY for continued management.      A: Airway intact  B: Breathing spontaneously, breath sounds are bilateral and equal  C: Pulses 2+throughout and equal.    D: Pupils equal and reactive, GCS 14 (E4, V4, M6). Moving all 4 extremities  E: Patient exposed and additional injuries noted; Warm blankets placed on patient     Secondary Survey:  NEURO: GCS 14, CN II-XII intact, VERNON equally, muscle strength 5/5  HEAD: NC/AT, No lacerations or abrasions, no bony step offs, midface stable.  EENT: PERRL, EOMI. Pupils 4-2mm b/l. external ear without laceration. Nasal septum midline, no crepitus or septal hematoma. Oral mucosa and tongue without lacerations, teeth in place.   NECK: No cervical spine tenderness or step offs, no lacerations or abrasions, trachea midline. No JVD.  RESPIRATORY/CHEST: No abrasions, contusions, crepitus or tenderness to palpation. Non-labored, equal chest expansion, CTAB, no W/R/R.  CV: RRR. Pulses bilateral: 2+ radial, 2+DP. No TTP of  "chest  ABDOMEN: soft, nontender, mild diffuse tenderness on palpation. No scars, abrasions or lacerations.  PELVIS: Stable to compression.  : nml external genitalia, no blood at urethral meatus  RECTAL: rectal tone deferred.  BACK/SPINE: No thoracic midline tenderness, step-offs or deformities. No lumbar midline tenderness, step-offs, or deformities.  No abrasions, hematomas or lacerations noted.  EXTREMITIES: Mild BLE edema. Nml ROM. Pain on palpation right thigh without obvious outward signs of trauma. No deformities, lacerations or contusions.        Past Medical History  Medical History   No past medical history on file.         Surgical History  Surgical History   No past surgical history on file.         Social History  He has no history on file for tobacco use, alcohol use, and drug use.     Family History  Family History   No family history on file.         Allergies  Patient has no known allergies.     Review of Systems     Physical Exam     Last Recorded Vitals  Blood pressure 133/71, pulse 84, temperature 36.6 °C (97.9 °F), resp. rate 18, height 1.676 m (5' 6\"), weight 81.6 kg (180 lb), SpO2 98%.     Relevant Results                     Results for orders placed or performed during the hospital encounter of 04/10/24 (from the past 24 hour(s))   CBC and Auto Differential   Result Value Ref Range     WBC 12.3 (H) 4.4 - 11.3 x10*3/uL     nRBC 0.0 0.0 - 0.0 /100 WBCs     RBC 3.97 (L) 4.50 - 5.90 x10*6/uL     Hemoglobin 11.8 (L) 13.5 - 17.5 g/dL     Hematocrit 36.4 (L) 41.0 - 52.0 %     MCV 92 80 - 100 fL     MCH 29.7 26.0 - 34.0 pg     MCHC 32.4 32.0 - 36.0 g/dL     RDW 14.0 11.5 - 14.5 %     Platelets 270 150 - 450 x10*3/uL     Neutrophils % 80.6 40.0 - 80.0 %     Immature Granulocytes %, Automated 0.2 0.0 - 0.9 %     Lymphocytes % 12.0 13.0 - 44.0 %     Monocytes % 5.9 2.0 - 10.0 %     Eosinophils % 0.9 0.0 - 6.0 %     Basophils % 0.4 0.0 - 2.0 %     Neutrophils Absolute 9.88 (H) 1.60 - 5.50 x10*3/uL     " Immature Granulocytes Absolute, Automated 0.03 0.00 - 0.50 x10*3/uL     Lymphocytes Absolute 1.47 0.80 - 3.00 x10*3/uL     Monocytes Absolute 0.73 0.05 - 0.80 x10*3/uL     Eosinophils Absolute 0.11 0.00 - 0.40 x10*3/uL     Basophils Absolute 0.05 0.00 - 0.10 x10*3/uL   Comprehensive metabolic panel   Result Value Ref Range     Glucose 179 (H) 74 - 99 mg/dL     Sodium 133 (L) 136 - 145 mmol/L     Potassium 4.7 3.5 - 5.3 mmol/L     Chloride 98 98 - 107 mmol/L     Bicarbonate 23 21 - 32 mmol/L     Anion Gap 17 10 - 20 mmol/L     Urea Nitrogen 16 6 - 23 mg/dL     Creatinine 1.17 0.50 - 1.30 mg/dL     eGFR 58 (L) >60 mL/min/1.73m*2     Calcium 9.5 8.6 - 10.3 mg/dL     Albumin 4.3 3.4 - 5.0 g/dL     Alkaline Phosphatase 86 33 - 136 U/L     Total Protein 7.0 6.4 - 8.2 g/dL     AST 23 9 - 39 U/L     Bilirubin, Total 1.5 (H) 0.0 - 1.2 mg/dL     ALT 12 10 - 52 U/L   aPTT   Result Value Ref Range     aPTT 30 27 - 38 seconds   Protime-INR   Result Value Ref Range     Protime 11.7 9.8 - 12.8 seconds     INR 1.0 0.9 - 1.1   Type and Screen   Result Value Ref Range     ABO TYPE O       Rh TYPE POS       ANTIBODY SCREEN NEG        XR femur right 2+ views     Result Date: 4/10/2024  STUDY: Femur Radiographs; 4/10/2024 6:12 AM INDICATION: Trauma. COMPARISON: None Available. ACCESSION NUMBER(S): [id number] ORDERING CLINICIAN: IRVING BECERRA TECHNIQUE:  Four view(s) of the right femur. FINDINGS:  Right-sided superior and inferior pubic rami and ischium normal. No fracture. The iliopectineal line on the right is normal. The femoral head is localized normally in the acetabular fossa. Femoral head appears to be normal. Femoral neck and intertrochanteric region appear to be normal. No fracture or trabecular line disruption. The femoral shaft appears to be normal. There are vessel calcifications identified. Small knee effusion suspected. Patellofemoral, medial and lateral compartment severe degenerative changes are identified. No definite  evidence of fracture involving the distal femoral shaft or the proximal tibial plateau or proximal fibula in their limited visualized extent.     No fracture or subluxation of the right femur. Severe tricompartmental degenerative changes of the right knee. Small knee effusion suspected. Signed by Godwin Riggs MD     XR pelvis 1-2 views     Result Date: 4/10/2024  STUDY: Pelvis Radiographs; 4/10/2024 6:12 AM INDICATION: Trauma. COMPARISON: None Available. ACCESSION NUMBER(S): [id number] ORDERING CLINICIAN: IRVING BECERRA TECHNIQUE:  One view(s) of the pelvis. FINDINGS:  Lower lumbar spine degenerative changes. Iliac wings normal with esthesiopathic changes. SI joint show degenerative changes. Bilateral hip DJD.  Superior and inferior pubic rami normal. Both hips appear to be localized normally in the acetabulum. Intertrochanteric region normal. Femoral head normal. Femoral neck appears normal. No distinct evidence of fracture identified trabecular lines appear to be normal. Proximal femoral shafts appear normal. Visualized portions of the pelvis remarkable for phleboliths.     Degenerative changes of the lower lumbar spine, hips  and SI joints. No evidence of fracture or subluxation. Signed by Godwin Riggs MD     XR chest 1 view     Result Date: 4/10/2024  STUDY: Chest Radiograph;  4/10/2024 6:12 AM INDICATION: Cough. COMPARISON: None Available. ACCESSION NUMBER(S): [id number] ORDERING CLINICIAN: IRVING BECERRA TECHNIQUE:  Frontal chest was obtained at 06:12 hours. FINDINGS: CARDIOMEDIASTINAL SILHOUETTE: Cardiomediastinal silhouette is normal in size and configuration. Calcified mediastinal lymph nodes. Airway normal.  LUNGS: Lungs are clear. Basilar atelectasis and scarring. No pneumothorax. No focal nodule  ABDOMEN: No remarkable upper abdominal findings. No free air.  BONES: No acute osseous changes. Degenerative changes of both shoulders and thoracic spine. No focal rib abnormality is identified.      Normal size heart. Evidence of prior granulomatous disease. Basilar atelectatic changes. No focal infiltrate. Signed by Godwin Riggs MD     CT head wo IV contrast     Result Date: 4/10/2024  Interpreted By:  Arnulfo Moncada, STUDY: CT HEAD WO IV CONTRAST; CT CERVICAL SPINE WO IV CONTRAST; ; 4/10/2024 6:00 am   INDICATION: Signs/Symptoms:trauma.   COMPARISON: None.   ACCESSION NUMBER(S): [id number]; [id number]   ORDERING CLINICIAN: IRVING BECERRA   TECHNIQUE: Noncontrast CT exams of the head and cervical spine with multiplanar reformations.   FINDINGS: BRAIN PARENCHYMA: Gray-white matter interfaces are preserved. 5 mm of leftward midline shift at the level of foramen of Monro.   HEMORRHAGE: There is an acute on chronic right frontoparietal subdural hematoma measuring up to 2.8 cm in maximal thickness along the anterolateral frontal convexity. VENTRICLES and EXTRA-AXIAL SPACES: There is cerebral sulcal effacement. Asymmetric narrowing of the right lateral ventricle and narrowing of the 3rd ventricle. No definite hydrocephalus. EXTRACRANIAL SOFT TISSUES: Small right posterior scalp hematoma. PARANASAL SINUSES/MASTOIDS: The visualized paranasal sinuses and mastoid air cells are aerated. CALVARIUM: No depressed skull fracture. No destructive osseous lesion.   OTHER FINDINGS: None.   CERVICAL SPINE:   Mild reversal of the normal cervical lordosis could reflect positioning or muscle spasm. ALIGNMENT: Grade 1 degenerative anterolisthesis at C2-C3 and C3-C4. Trace degenerative anterolisthesis at C7-T1. Alignment is otherwise maintained. VERTEBRAE: No acute fracture is seen. Vertebral stature is maintained. Degenerative endplate osteophytosis and uncovertebral hypertrophy in conjunction with moderate to severe degenerative disc height loss, overall worst at C7-T1. Moderate to severe multilevel hypertrophic facet osteoarthropathy. SPINAL CANAL: No critical spinal canal stenosis. PREVERTEBRAL SOFT TISSUES: No  prevertebral soft tissue swelling. LUNG APICES: Imaged portion of the lung apices are within normal limits.   OTHER FINDINGS: None.        There is an acute on chronic right frontoparietal subdural hematoma measuring up to 2.8 cm in maximal thickness along the anterolateral frontal convexity. There is cerebral sulcal effacement, narrowing of the right lateral and 3rd ventricles and 5 mm of leftward midline shift. Neurosurgical evaluation recommended.   No acute cervical spine fracture or malalignment. Severe spondylosis.     MACRO: Arnulfo Moncada discussed the significance and urgency of this critical finding by epic secure chat with  IRVING BECERRA on 4/10/2024 at 6:25 am.  (**-RCF-**) Findings:  See findings.     Signed by: Arnulfo Moncada 4/10/2024 6:25 AM Dictation workstation:   [id number]     CT cervical spine wo IV contrast     Result Date: 4/10/2024  Interpreted By:  Arnulfo Moncada, STUDY: CT HEAD WO IV CONTRAST; CT CERVICAL SPINE WO IV CONTRAST; ; 4/10/2024 6:00 am   INDICATION: Signs/Symptoms:trauma.   COMPARISON: None.   ACCESSION NUMBER(S): [id number]; [id number]   ORDERING CLINICIAN: IRVING BECERRA   TECHNIQUE: Noncontrast CT exams of the head and cervical spine with multiplanar reformations.   FINDINGS: BRAIN PARENCHYMA: Gray-white matter interfaces are preserved. 5 mm of leftward midline shift at the level of foramen of Monro.   HEMORRHAGE: There is an acute on chronic right frontoparietal subdural hematoma measuring up to 2.8 cm in maximal thickness along the anterolateral frontal convexity. VENTRICLES and EXTRA-AXIAL SPACES: There is cerebral sulcal effacement. Asymmetric narrowing of the right lateral ventricle and narrowing of the 3rd ventricle. No definite hydrocephalus. EXTRACRANIAL SOFT TISSUES: Small right posterior scalp hematoma. PARANASAL SINUSES/MASTOIDS: The visualized paranasal sinuses and mastoid air cells are aerated. CALVARIUM: No depressed skull fracture. No destructive osseous lesion.    OTHER FINDINGS: None.   CERVICAL SPINE:   Mild reversal of the normal cervical lordosis could reflect positioning or muscle spasm. ALIGNMENT: Grade 1 degenerative anterolisthesis at C2-C3 and C3-C4. Trace degenerative anterolisthesis at C7-T1. Alignment is otherwise maintained. VERTEBRAE: No acute fracture is seen. Vertebral stature is maintained. Degenerative endplate osteophytosis and uncovertebral hypertrophy in conjunction with moderate to severe degenerative disc height loss, overall worst at C7-T1. Moderate to severe multilevel hypertrophic facet osteoarthropathy. SPINAL CANAL: No critical spinal canal stenosis. PREVERTEBRAL SOFT TISSUES: No prevertebral soft tissue swelling. LUNG APICES: Imaged portion of the lung apices are within normal limits.   OTHER FINDINGS: None.        There is an acute on chronic right frontoparietal subdural hematoma measuring up to 2.8 cm in maximal thickness along the anterolateral frontal convexity. There is cerebral sulcal effacement, narrowing of the right lateral and 3rd ventricles and 5 mm of leftward midline shift. Neurosurgical evaluation recommended.   No acute cervical spine fracture or malalignment. Severe spondylosis.     MACRO: Arnulfo Moncada discussed the significance and urgency of this critical finding by epic secure chat with  IRVING BECERRA on 4/10/2024 at 6:25 am.  (**-RCF-**) Findings:  See findings.     Signed by: Arnulfo Moncada 4/10/2024 6:25 AM Dictation workstation:   [id number]            Assessment/Plan   Principal Problem:    SDH (subdural hematoma) (CMS/[facility])     Roman Lakhani is a 89 y.o. Czech speaking male with past medical history significant for dementia, CKD, DM, COPD w/ chronic resp failure (O2 dependent), paranoid schizophrenia, cardiomyopathy, anxiety/depression, HTN, HLD, BPH, and GERD who presented to [facility] ED 4/10 from [facility] s/p unwitnessed fall. Patient pan scanned with the following significant results: acute on  chronic right frontoparietal SDH measuring up to 2.8cm as well as cerebral sulcal effacement, narrowing of the right lateral and 3rd ventricles and 5mm leftward midline shift. Patient admitted to trauma service with consult to NSGY for continued management.      List of clinically significant injuries/problems:  Acute on chronic 2.8 cm SDH with 5 mm leftward midline shift  Small right knee effusion with severe tricompartmental degenerative changes  Leukocytosis  Hyperglycemia  Bed bugs     Assessment/Plan:     # SDH  - NSGY consulted, appreciate input  - pending repeat head CT at 1230     -> NPO pending stability of repeat CT  - maintain HOB >30 degrees  - maintain SBP <160  - Q4H neurochecks  - avoid narcotics if possible given hx dementia with TBI d/t concern for development of delirium  - acetaminophen prn for pain  - PT/OT     # Leukocytosis  - likely reactive  - if uptrending will look for additional causes  - am labs     # Dementia  # DM  # CKD  # COPD  # Chronic resp failure (O2 dependent)  # HTN  # HLD  - medicine consulted  - ISS while inpatient  - restart home meds when med rec complete and medically appropriate      # Bed bugs  - contact precautions     # DVT ppx  - hold until cleared by NSGY  - SCD's     Plan not finalized until discussed with Dr. Pickens                 I spent 60 minutes in the professional and overall care of this patient.                                           Cosigned by:         Revision History    Patient fully evaluated on April 12.  Patient more awake and alert this afternoon was minimally responsive earlier.  Patient has a rash over his forearms consistent with eczema.  Questionable bedbug was found in the emergency room and awaiting pathologic evaluation.  I had fully examined the patient and found no further bedbugs at this time.  Recommend triamcinolone cream twice daily to areas of eczema.  Recheck labs in AM.  Further neurochecks overnight.                                         Patient Fully evaluated 4/12/2024. Patient was given IV fluids. Patient signed off by neurosurgery and ready for Discharge.  Continue to monitor neurologic examination.  Recheck labs in AM.                JOSIE BARAKAT

## 2024-04-13 LAB
ALBUMIN SERPL BCP-MCNC: 3.6 G/DL (ref 3.4–5)
ALP SERPL-CCNC: 73 U/L (ref 33–136)
ALT SERPL W P-5'-P-CCNC: 14 U/L (ref 10–52)
ANION GAP SERPL CALC-SCNC: 12 MMOL/L (ref 10–20)
AST SERPL W P-5'-P-CCNC: 32 U/L (ref 9–39)
BILIRUB SERPL-MCNC: 1 MG/DL (ref 0–1.2)
BUN SERPL-MCNC: 20 MG/DL (ref 6–23)
CALCIUM SERPL-MCNC: 8.8 MG/DL (ref 8.6–10.3)
CHLORIDE SERPL-SCNC: 100 MMOL/L (ref 98–107)
CO2 SERPL-SCNC: 27 MMOL/L (ref 21–32)
CREAT SERPL-MCNC: 1.25 MG/DL (ref 0.5–1.3)
EGFRCR SERPLBLD CKD-EPI 2021: 54 ML/MIN/1.73M*2
ERYTHROCYTE [DISTWIDTH] IN BLOOD BY AUTOMATED COUNT: 14.4 % (ref 11.5–14.5)
GLUCOSE BLD MANUAL STRIP-MCNC: 101 MG/DL (ref 74–99)
GLUCOSE BLD MANUAL STRIP-MCNC: 140 MG/DL (ref 74–99)
GLUCOSE BLD MANUAL STRIP-MCNC: 156 MG/DL (ref 74–99)
GLUCOSE BLD MANUAL STRIP-MCNC: 164 MG/DL (ref 74–99)
GLUCOSE SERPL-MCNC: 101 MG/DL (ref 74–99)
HCT VFR BLD AUTO: 34.4 % (ref 41–52)
HGB BLD-MCNC: 11.2 G/DL (ref 13.5–17.5)
MCH RBC QN AUTO: 29.6 PG (ref 26–34)
MCHC RBC AUTO-ENTMCNC: 32.6 G/DL (ref 32–36)
MCV RBC AUTO: 91 FL (ref 80–100)
NRBC BLD-RTO: 0 /100 WBCS (ref 0–0)
PLATELET # BLD AUTO: 285 X10*3/UL (ref 150–450)
POTASSIUM SERPL-SCNC: 3.9 MMOL/L (ref 3.5–5.3)
PROT SERPL-MCNC: 6.2 G/DL (ref 6.4–8.2)
RBC # BLD AUTO: 3.79 X10*6/UL (ref 4.5–5.9)
SODIUM SERPL-SCNC: 135 MMOL/L (ref 136–145)
WBC # BLD AUTO: 9.6 X10*3/UL (ref 4.4–11.3)

## 2024-04-13 PROCEDURE — 2500000002 HC RX 250 W HCPCS SELF ADMINISTERED DRUGS (ALT 637 FOR MEDICARE OP, ALT 636 FOR OP/ED): Performed by: NURSE PRACTITIONER

## 2024-04-13 PROCEDURE — 2060000001 HC INTERMEDIATE ICU ROOM DAILY

## 2024-04-13 PROCEDURE — 92526 ORAL FUNCTION THERAPY: CPT | Mod: GN

## 2024-04-13 PROCEDURE — 2500000004 HC RX 250 GENERAL PHARMACY W/ HCPCS (ALT 636 FOR OP/ED): Performed by: INTERNAL MEDICINE

## 2024-04-13 PROCEDURE — 80053 COMPREHEN METABOLIC PANEL: CPT | Performed by: INTERNAL MEDICINE

## 2024-04-13 PROCEDURE — 2500000004 HC RX 250 GENERAL PHARMACY W/ HCPCS (ALT 636 FOR OP/ED): Performed by: NURSE PRACTITIONER

## 2024-04-13 PROCEDURE — 36415 COLL VENOUS BLD VENIPUNCTURE: CPT | Performed by: INTERNAL MEDICINE

## 2024-04-13 PROCEDURE — 2500000001 HC RX 250 WO HCPCS SELF ADMINISTERED DRUGS (ALT 637 FOR MEDICARE OP): Performed by: NURSE PRACTITIONER

## 2024-04-13 PROCEDURE — 82947 ASSAY GLUCOSE BLOOD QUANT: CPT

## 2024-04-13 PROCEDURE — 85027 COMPLETE CBC AUTOMATED: CPT | Performed by: INTERNAL MEDICINE

## 2024-04-13 RX ORDER — DEXTROSE MONOHYDRATE AND SODIUM CHLORIDE 5; .45 G/100ML; G/100ML
50 INJECTION, SOLUTION INTRAVENOUS CONTINUOUS
Status: DISCONTINUED | OUTPATIENT
Start: 2024-04-13 | End: 2024-04-16

## 2024-04-13 RX ADMIN — FAMOTIDINE 20 MG: 20 TABLET ORAL at 09:07

## 2024-04-13 RX ADMIN — INSULIN GLARGINE 17 UNITS: 100 INJECTION, SOLUTION SUBCUTANEOUS at 18:30

## 2024-04-13 RX ADMIN — DONEPEZIL HYDROCHLORIDE 10 MG: 10 TABLET ORAL at 09:07

## 2024-04-13 RX ADMIN — LISINOPRIL 5 MG: 5 TABLET ORAL at 09:06

## 2024-04-13 RX ADMIN — POLYETHYLENE GLYCOL 3350 17 G: 17 POWDER, FOR SOLUTION ORAL at 09:06

## 2024-04-13 RX ADMIN — DEXTROSE AND SODIUM CHLORIDE 50 ML/HR: 5; 450 INJECTION, SOLUTION INTRAVENOUS at 16:00

## 2024-04-13 RX ADMIN — DONEPEZIL HYDROCHLORIDE 10 MG: 10 TABLET ORAL at 20:15

## 2024-04-13 RX ADMIN — ATORVASTATIN CALCIUM 40 MG: 40 TABLET, FILM COATED ORAL at 09:07

## 2024-04-13 RX ADMIN — TAMSULOSIN HYDROCHLORIDE 0.4 MG: 0.4 CAPSULE ORAL at 09:06

## 2024-04-13 RX ADMIN — ONDANSETRON 4 MG: 2 INJECTION INTRAMUSCULAR; INTRAVENOUS at 12:15

## 2024-04-13 RX ADMIN — SPIRONOLACTONE 25 MG: 25 TABLET, FILM COATED ORAL at 09:06

## 2024-04-13 RX ADMIN — TRIAMCINOLONE ACETONIDE: 1 OINTMENT TOPICAL at 09:07

## 2024-04-13 ASSESSMENT — COGNITIVE AND FUNCTIONAL STATUS - GENERAL
DAILY ACTIVITIY SCORE: 7
HELP NEEDED FOR BATHING: TOTAL
CLIMB 3 TO 5 STEPS WITH RAILING: A LOT
EATING MEALS: A LOT
WALKING IN HOSPITAL ROOM: A LOT
STANDING UP FROM CHAIR USING ARMS: A LOT
MOVING FROM LYING ON BACK TO SITTING ON SIDE OF FLAT BED WITH BEDRAILS: A LITTLE
TURNING FROM BACK TO SIDE WHILE IN FLAT BAD: A LOT
TOILETING: TOTAL
DRESSING REGULAR UPPER BODY CLOTHING: TOTAL
PERSONAL GROOMING: TOTAL
DRESSING REGULAR LOWER BODY CLOTHING: TOTAL
MOBILITY SCORE: 13
MOVING TO AND FROM BED TO CHAIR: A LOT

## 2024-04-13 ASSESSMENT — PAIN SCALES - WONG BAKER
WONGBAKER_NUMERICALRESPONSE: NO HURT
WONGBAKER_NUMERICALRESPONSE: NO HURT

## 2024-04-13 ASSESSMENT — PAIN SCALES - GENERAL: PAINLEVEL_OUTOF10: 0 - NO PAIN

## 2024-04-13 ASSESSMENT — PAIN - FUNCTIONAL ASSESSMENT
PAIN_FUNCTIONAL_ASSESSMENT: UNABLE TO SELF-REPORT
PAIN_FUNCTIONAL_ASSESSMENT: 0-10

## 2024-04-13 NOTE — PROGRESS NOTES
Roman Lakhani is a 89 y.o. male on day 3 of admission presenting with SDH (subdural hematoma) (Multi).      Subjective   Patient fully evaluated on April 13. 2024       Objective     Last Recorded Vitals  /76 (BP Location: Right arm, Patient Position: Lying)   Pulse 74   Temp 35.6 °C (96.1 °F) (Temporal)   Resp 20   Wt 81.6 kg (179 lb 14.3 oz)   SpO2 93%   Intake/Output last 3 Shifts:    Intake/Output Summary (Last 24 hours) at 4/13/2024 1450  Last data filed at 4/13/2024 0858  Gross per 24 hour   Intake 200 ml   Output 450 ml   Net -250 ml       Admission Weight  Weight: 81.6 kg (180 lb) (04/10/24 0452)    Daily Weight  04/12/24 : 81.6 kg (179 lb 14.3 oz)    Image Results  XR chest 1 view  Narrative: Interpreted By:  Alfreda Yoon,   STUDY:  XR CHEST 1 VIEW;  4/12/2024 1:38 pm      INDICATION:  Signs/Symptoms:? aspiration.      COMPARISON:  04/10/2024      ACCESSION NUMBER(S):  [id number]      ORDERING CLINICIAN:  GERHARD DOWLING      FINDINGS:  CARDIOMEDIASTINAL SILHOUETTE:  Cardiomediastinal silhouette is normal in size and configuration.          LUNGS:  Lungs are clear.      ABDOMEN:  No remarkable upper abdominal findings.          BONES:  No acute osseous changes.      Impression: No acute cardiopulmonary process.      MACRO:  None      Signed by: Alfreda Yoon 4/12/2024 3:15 PM  Dictation workstation:   [id number]      Physical Exam  Constitutional:       General: He is not in acute distress.     Appearance: He is normal weight. He is ill-appearing. He is not toxic-appearing or diaphoretic.      Comments: Awake, pleasant, confused   HENT:      Head: Normocephalic and atraumatic.      Nose: Nose normal.      Mouth/Throat:      Mouth: Mucous membranes are moist.      Comments: Slight food exudate from recent breakfast  Eyes:      Pupils: Pupils are equal, round, and reactive to light.   Neck:      Comments: The patient does describe slight pain with neck movement  Cardiovascular:       Rate and Rhythm: Normal rate and regular rhythm.      Pulses: Normal pulses.      Heart sounds: Normal heart sounds.   Pulmonary:      Effort: Pulmonary effort is normal. No respiratory distress.      Breath sounds: Normal breath sounds.   Abdominal:      General: Bowel sounds are normal. There is distension.      Palpations: Abdomen is soft.      Tenderness: There is no abdominal tenderness.      Comments: Mild distention due to body habitus   Genitourinary:     Comments: External urinary catheter in place  Musculoskeletal:         General: No deformity.      Cervical back: Neck supple. Tenderness present.      Right lower leg: Edema present.      Left lower leg: Edema present.      Comments: Mild bilateral lower extremity edema noted, and tenderness to palpation of bilateral lower extremities; the patient does describe tenderness to bilateral shoulders and neck area, more so with palpation, but relieved with repositioning   Skin:     General: Skin is warm and dry.      Coloration: Skin is pale.   Neurological:      Mental Status: He is alert. Mental status is at baseline. He is disoriented.      Motor: Weakness present.      Comments: A&O x 1-2.  Conversant with clear speech and able to participate in conversation.  Able to follow simple commands.  2/5 right grasp with 1/5 left grasp, and does not lift either arm but does move them laterally in bed. 2/5 bilateral lower extremity strength, mostly moving legs slightly laterally in bed.   Psychiatric:         Mood and Affect: Mood normal.         Behavior: Behavior normal.         Thought Content: Thought content normal.      Comments: Without restlessness   Relevant Results  Scheduled medications  atorvastatin, 40 mg, oral, Daily  donepezil, 10 mg, oral, BID  famotidine, 20 mg, oral, Daily  insulin glargine, 17 Units, subcutaneous, q24h  insulin regular, 0-10 Units, subcutaneous, TID with meals  levothyroxine, 150 mcg, oral, Daily before breakfast  lisinopril, 5 mg,  oral, Daily  pantoprazole, 20 mg, oral, Daily before breakfast  polyethylene glycol, 17 g, oral, Daily  spironolactone, 25 mg, oral, Daily  tamsulosin, 0.4 mg, oral, Daily  triamcinolone, , Topical, BID      Continuous medications     PRN medications  PRN medications: acetaminophen, dextrose, dextrose, glucagon, glucagon, hydrALAZINE, ipratropium-albuteroL, ondansetron   Results for orders placed or performed during the hospital encounter of 04/10/24 (from the past 24 hour(s))   POCT GLUCOSE   Result Value Ref Range    POCT Glucose 157 (H) 74 - 99 mg/dL   POCT GLUCOSE   Result Value Ref Range    POCT Glucose 195 (H) 74 - 99 mg/dL   CBC   Result Value Ref Range    WBC 9.6 4.4 - 11.3 x10*3/uL    nRBC 0.0 0.0 - 0.0 /100 WBCs    RBC 3.79 (L) 4.50 - 5.90 x10*6/uL    Hemoglobin 11.2 (L) 13.5 - 17.5 g/dL    Hematocrit 34.4 (L) 41.0 - 52.0 %    MCV 91 80 - 100 fL    MCH 29.6 26.0 - 34.0 pg    MCHC 32.6 32.0 - 36.0 g/dL    RDW 14.4 11.5 - 14.5 %    Platelets 285 150 - 450 x10*3/uL   Comprehensive Metabolic Panel   Result Value Ref Range    Glucose 101 (H) 74 - 99 mg/dL    Sodium 135 (L) 136 - 145 mmol/L    Potassium 3.9 3.5 - 5.3 mmol/L    Chloride 100 98 - 107 mmol/L    Bicarbonate 27 21 - 32 mmol/L    Anion Gap 12 10 - 20 mmol/L    Urea Nitrogen 20 6 - 23 mg/dL    Creatinine 1.25 0.50 - 1.30 mg/dL    eGFR 54 (L) >60 mL/min/1.73m*2    Calcium 8.8 8.6 - 10.3 mg/dL    Albumin 3.6 3.4 - 5.0 g/dL    Alkaline Phosphatase 73 33 - 136 U/L    Total Protein 6.2 (L) 6.4 - 8.2 g/dL    AST 32 9 - 39 U/L    Bilirubin, Total 1.0 0.0 - 1.2 mg/dL    ALT 14 10 - 52 U/L   POCT GLUCOSE   Result Value Ref Range    POCT Glucose 101 (H) 74 - 99 mg/dL   POCT GLUCOSE   Result Value Ref Range    POCT Glucose 140 (H) 74 - 99 mg/dL             Assessment/Plan   This patient currently has cardiac telemetry ordered; if you would like to modify or discontinue the telemetry order, click here to go to the orders activity to modify/discontinue the  order.              Principal Problem:    SDH (subdural hematoma) (Multi)    Patient fully evaluated on April 13, 2024. Family at bedside. Reviewed labs this morning . Will start IV fluids. Will continue to monitor inpatient. Repeat labs in the am.             MARÍA BRADEN

## 2024-04-13 NOTE — NURSING NOTE
Hx of     - C/w home remeron and fluoxetine  - seroquel BID     Patient offered pain medication and takes medications whole with liquid.  Patient swallows pills and then spits remaining apple juice on nurse.

## 2024-04-13 NOTE — NURSING NOTE
Patient remains agitated and restless this am with hygiene, bedside care and lab draw.  Attempts to communicate with patient are minimally successful.

## 2024-04-13 NOTE — CARE PLAN
The patient's goals for the shift include rest and comfort    The clinical goals for the shift include rest and comfort    Over the shift, the patient did not make progress toward the following goals. Barriers to progression include . Recommendations to address these barriers include .    Problem: Pain  Goal: My pain/discomfort is manageable  Outcome: Not Progressing     Problem: Safety  Goal: Patient will be injury free during hospitalization  Outcome: Not Progressing  Goal: I will remain free of falls  Outcome: Not Progressing     Problem: Daily Care  Goal: Daily care needs are met  Outcome: Not Progressing     Problem: Psychosocial Needs  Goal: Demonstrates ability to cope with hospitalization/illness  Outcome: Not Progressing  Goal: Collaborate with me, my family, and caregiver to identify my specific goals  Outcome: Not Progressing     Problem: Discharge Barriers  Goal: My discharge needs are met  Outcome: Not Progressing     Problem: Skin  Goal: Participates in plan/prevention/treatment measures  Outcome: Not Progressing  Goal: Prevent/manage excess moisture  Outcome: Not Progressing  Goal: Promote skin healing  Outcome: Not Progressing

## 2024-04-13 NOTE — PROGRESS NOTES
Speech-Language Pathology    SLP Adult Inpatient  Speech-Language Pathology Treatment     Patient Name: Roman Lakhani  MRN: [id number]  Today's Date: 4/13/2024  Time Calculation  Start Time: 0830  Stop Time: 0844  Time Calculation (min): 14 min     Current Problem:   1. SDH (subdural hematoma) (Multi)          ST Recommends:   Downgrade to a PUREE DIET with THIN LIQUIDS  1:1 assist due to ongoing confusion  Pills crushed in puree carrier. Single cup sips of liquid (pt could not utilize drinking straw during today's re-assessment)  Cue pt to complete swallow prior to verbalizing/vocalizing  Reduce distractions with p.o. intake    SLP Assessment:  SLP TX Intervention Outcome: Making Progress Towards Goals  SLP Assessment Results:  Patient appears safe to remain on an oral (yet further modified diet level) given 1:1 assist and strict adherence to swallow guidelines given ongoing AMS. Pt was restless and disrobing through out re-assessment of swallow, requiring frequent re-direction and reduction of distractions. Pt repeatedly chanting prayers in Bengali. Max cueing for pt to accept p.o. trials and not verbalize while p.o. remained within oral cavity. Pt unable to adequately obtain liquids via drinking straw, with reduced labial seal obtained around cup rim when drinking thin liquids. Puree trials tolerated without incident -timely bolus formation and A-P transfer. Soft and hard solids trialed, however incomplete initial bite of solids and further incomplete mastication observed, resulting in pt attempting to swallow larger pieces of solids which increases overall choking risk. Scattered oral residuals required numerous liquid washes following solids to eventually clear. No overt s/s of aspiration and vocal quality remained clear following all tested consistencies. Pt restless and refused to allow neck palpation.     Dysphagia Goals (established 4/12):    Patient will participate in re-assessment of swallowing with  SLP for possible diet advancement from NPO as indicated.  - Goal met 4/13.   Pt/caregiver education. - Goal ongoing 4/13. No family members present. Education provided to NSG (Amelia).   New Goal established 4/13:   3. Pt will tolerate LRD without overt s/s of aspiration on all p.o. intake given 1:1 assist and strict adherence to swallow controls    Prognosis: Fair  Treatment Provided: Yes.   Treatment Tolerance: Treatment limited secondary to AMS and restlessness.  Medical Staff Made Aware: Yes  Barriers: Cognition  Education Provided: Yes     Plan:  Inpatient/Swing Bed or Outpatient: Inpatient  Treatment/Interventions:  (Dysphagia tx)  SLP TX Plan: Continue Plan of Care  SLP Plan: Skilled SLP  SLP Discharge Recommendations:  (will continue to assess while in-house)  Discussed POC: Patient, Nursing (Amelia)  Discussed Risks/Benefits: Yes  Patient/Caregiver Agreeable: Yes    Subjective   Current Problem:  Despite NPO recommended following CSE on 4/12, active diet order of Easy to Chew/thin liquids remains. NSG confirms pt's alertness levels improved compared to yesterday, but ongoing confusion persists.     Most Recent Visit:  SLP Received On: 04/13/24    General Visit Information:   Patient Seen During This Visit: Yes  Prior to Session Communication: Bedside nurse (Amelia)    Pain Assessment:   Pain Assessment: Unable to self-report  Pain Score:  (no pain behaviors demonstrated, however pt restless)    Objective     Therapeutic Swallow:  Therapeutic Swallow Intervention : PO Trials, Caregiver Education, Compensatory Strategies  Solid Diet Recommendations: Pureed/extremely thick  (IDDSI Level 4)  Liquid Diet Recommendations: Thin (IDDSI Level 0)    Inpatient:  Education Comments  Results/recommendations (including Puree/Thin Liquid Diet level, swallow controls, aspiration precautions, POC) discussed with pt and NSG. Swallow guideline poster formulated and hung behind bed space.

## 2024-04-14 LAB
ALBUMIN SERPL BCP-MCNC: 3.5 G/DL (ref 3.4–5)
ALP SERPL-CCNC: 71 U/L (ref 33–136)
ALT SERPL W P-5'-P-CCNC: 14 U/L (ref 10–52)
ANION GAP SERPL CALC-SCNC: 11 MMOL/L (ref 10–20)
AST SERPL W P-5'-P-CCNC: 27 U/L (ref 9–39)
BILIRUB SERPL-MCNC: 1 MG/DL (ref 0–1.2)
BUN SERPL-MCNC: 16 MG/DL (ref 6–23)
CALCIUM SERPL-MCNC: 8.6 MG/DL (ref 8.6–10.3)
CHLORIDE SERPL-SCNC: 99 MMOL/L (ref 98–107)
CO2 SERPL-SCNC: 28 MMOL/L (ref 21–32)
CREAT SERPL-MCNC: 1.09 MG/DL (ref 0.5–1.3)
EGFRCR SERPLBLD CKD-EPI 2021: 64 ML/MIN/1.73M*2
ERYTHROCYTE [DISTWIDTH] IN BLOOD BY AUTOMATED COUNT: 14.1 % (ref 11.5–14.5)
GLUCOSE BLD MANUAL STRIP-MCNC: 130 MG/DL (ref 74–99)
GLUCOSE BLD MANUAL STRIP-MCNC: 174 MG/DL (ref 74–99)
GLUCOSE BLD MANUAL STRIP-MCNC: 194 MG/DL (ref 74–99)
GLUCOSE BLD MANUAL STRIP-MCNC: 234 MG/DL (ref 74–99)
GLUCOSE SERPL-MCNC: 129 MG/DL (ref 74–99)
HCT VFR BLD AUTO: 34.8 % (ref 41–52)
HGB BLD-MCNC: 11.3 G/DL (ref 13.5–17.5)
MCH RBC QN AUTO: 29.6 PG (ref 26–34)
MCHC RBC AUTO-ENTMCNC: 32.5 G/DL (ref 32–36)
MCV RBC AUTO: 91 FL (ref 80–100)
NRBC BLD-RTO: 0 /100 WBCS (ref 0–0)
PLATELET # BLD AUTO: 281 X10*3/UL (ref 150–450)
POTASSIUM SERPL-SCNC: 4.3 MMOL/L (ref 3.5–5.3)
PROT SERPL-MCNC: 6.1 G/DL (ref 6.4–8.2)
RBC # BLD AUTO: 3.82 X10*6/UL (ref 4.5–5.9)
SODIUM SERPL-SCNC: 134 MMOL/L (ref 136–145)
WBC # BLD AUTO: 9.5 X10*3/UL (ref 4.4–11.3)

## 2024-04-14 PROCEDURE — 2500000002 HC RX 250 W HCPCS SELF ADMINISTERED DRUGS (ALT 637 FOR MEDICARE OP, ALT 636 FOR OP/ED): Performed by: NURSE PRACTITIONER

## 2024-04-14 PROCEDURE — 82947 ASSAY GLUCOSE BLOOD QUANT: CPT

## 2024-04-14 PROCEDURE — 80053 COMPREHEN METABOLIC PANEL: CPT | Performed by: INTERNAL MEDICINE

## 2024-04-14 PROCEDURE — 2060000001 HC INTERMEDIATE ICU ROOM DAILY

## 2024-04-14 PROCEDURE — 2500000001 HC RX 250 WO HCPCS SELF ADMINISTERED DRUGS (ALT 637 FOR MEDICARE OP): Performed by: INTERNAL MEDICINE

## 2024-04-14 PROCEDURE — 2500000001 HC RX 250 WO HCPCS SELF ADMINISTERED DRUGS (ALT 637 FOR MEDICARE OP): Performed by: NURSE PRACTITIONER

## 2024-04-14 PROCEDURE — 85027 COMPLETE CBC AUTOMATED: CPT | Performed by: INTERNAL MEDICINE

## 2024-04-14 PROCEDURE — 36415 COLL VENOUS BLD VENIPUNCTURE: CPT | Performed by: INTERNAL MEDICINE

## 2024-04-14 PROCEDURE — 2500000004 HC RX 250 GENERAL PHARMACY W/ HCPCS (ALT 636 FOR OP/ED): Performed by: NURSE PRACTITIONER

## 2024-04-14 RX ADMIN — POLYETHYLENE GLYCOL 3350 17 G: 17 POWDER, FOR SOLUTION ORAL at 09:31

## 2024-04-14 RX ADMIN — ACETAMINOPHEN 650 MG: 325 TABLET ORAL at 11:21

## 2024-04-14 RX ADMIN — ATORVASTATIN CALCIUM 40 MG: 40 TABLET, FILM COATED ORAL at 09:30

## 2024-04-14 RX ADMIN — LEVOTHYROXINE SODIUM 150 MCG: 150 TABLET ORAL at 06:00

## 2024-04-14 RX ADMIN — TAMSULOSIN HYDROCHLORIDE 0.4 MG: 0.4 CAPSULE ORAL at 09:31

## 2024-04-14 RX ADMIN — DONEPEZIL HYDROCHLORIDE 10 MG: 10 TABLET ORAL at 20:52

## 2024-04-14 RX ADMIN — TRIAMCINOLONE ACETONIDE: 1 OINTMENT TOPICAL at 09:31

## 2024-04-14 RX ADMIN — FAMOTIDINE 20 MG: 20 TABLET ORAL at 09:30

## 2024-04-14 RX ADMIN — PANTOPRAZOLE SODIUM 20 MG: 20 TABLET, DELAYED RELEASE ORAL at 07:00

## 2024-04-14 RX ADMIN — TRIAMCINOLONE ACETONIDE: 1 OINTMENT TOPICAL at 20:58

## 2024-04-14 RX ADMIN — SPIRONOLACTONE 25 MG: 25 TABLET, FILM COATED ORAL at 09:31

## 2024-04-14 RX ADMIN — INSULIN HUMAN 2 UNITS: 100 INJECTION, SOLUTION PARENTERAL at 12:32

## 2024-04-14 RX ADMIN — DONEPEZIL HYDROCHLORIDE 10 MG: 10 TABLET ORAL at 09:31

## 2024-04-14 RX ADMIN — INSULIN GLARGINE 17 UNITS: 100 INJECTION, SOLUTION SUBCUTANEOUS at 17:26

## 2024-04-14 RX ADMIN — INSULIN HUMAN 4 UNITS: 100 INJECTION, SOLUTION PARENTERAL at 16:49

## 2024-04-14 RX ADMIN — LISINOPRIL 5 MG: 5 TABLET ORAL at 09:31

## 2024-04-14 ASSESSMENT — COGNITIVE AND FUNCTIONAL STATUS - GENERAL
MOBILITY SCORE: 7
MOVING FROM LYING ON BACK TO SITTING ON SIDE OF FLAT BED WITH BEDRAILS: A LOT
HELP NEEDED FOR BATHING: TOTAL
DRESSING REGULAR UPPER BODY CLOTHING: TOTAL
DAILY ACTIVITIY SCORE: 6
WALKING IN HOSPITAL ROOM: TOTAL
TOILETING: TOTAL
TURNING FROM BACK TO SIDE WHILE IN FLAT BAD: TOTAL
DAILY ACTIVITIY SCORE: 6
MOBILITY SCORE: 7
DRESSING REGULAR UPPER BODY CLOTHING: TOTAL
PERSONAL GROOMING: TOTAL
WALKING IN HOSPITAL ROOM: TOTAL
STANDING UP FROM CHAIR USING ARMS: TOTAL
MOVING TO AND FROM BED TO CHAIR: TOTAL
PERSONAL GROOMING: TOTAL
DRESSING REGULAR LOWER BODY CLOTHING: TOTAL
MOVING TO AND FROM BED TO CHAIR: TOTAL
TOILETING: TOTAL
EATING MEALS: TOTAL
HELP NEEDED FOR BATHING: TOTAL
TURNING FROM BACK TO SIDE WHILE IN FLAT BAD: TOTAL
MOVING FROM LYING ON BACK TO SITTING ON SIDE OF FLAT BED WITH BEDRAILS: A LOT
CLIMB 3 TO 5 STEPS WITH RAILING: TOTAL
DRESSING REGULAR LOWER BODY CLOTHING: TOTAL
CLIMB 3 TO 5 STEPS WITH RAILING: TOTAL
EATING MEALS: TOTAL
STANDING UP FROM CHAIR USING ARMS: TOTAL

## 2024-04-14 ASSESSMENT — PAIN SCALES - WONG BAKER: WONGBAKER_NUMERICALRESPONSE: NO HURT

## 2024-04-14 ASSESSMENT — PAIN - FUNCTIONAL ASSESSMENT
PAIN_FUNCTIONAL_ASSESSMENT: 0-10
PAIN_FUNCTIONAL_ASSESSMENT: 0-10

## 2024-04-14 ASSESSMENT — PAIN SCALES - GENERAL
PAINLEVEL_OUTOF10: 0 - NO PAIN
PAINLEVEL_OUTOF10: 2

## 2024-04-14 NOTE — CARE PLAN
The patient's goals for the shift include safety and comfort    The clinical goals for the shift include safety and comfort

## 2024-04-14 NOTE — PROGRESS NOTES
Roman Lakhani is a 89 y.o. male on day 4 of admission presenting with SDH (subdural hematoma) (Multi).      Subjective   Patient fully evaluated on April 13. 2024       Objective     Last Recorded Vitals  /55   Pulse 71   Temp 36.1 °C (97 °F) (Temporal)   Resp 20   Wt 81.6 kg (179 lb 14.3 oz)   SpO2 94%   Intake/Output last 3 Shifts:    Intake/Output Summary (Last 24 hours) at 4/14/2024 1528  Last data filed at 4/14/2024 1341  Gross per 24 hour   Intake 1084.17 ml   Output 500 ml   Net 584.17 ml       Admission Weight  Weight: 81.6 kg (180 lb) (04/10/24 0452)    Daily Weight  04/12/24 : 81.6 kg (179 lb 14.3 oz)    Image Results  XR chest 1 view  Narrative: Interpreted By:  Alfreda Yoon,   STUDY:  XR CHEST 1 VIEW;  4/12/2024 1:38 pm      INDICATION:  Signs/Symptoms:? aspiration.      COMPARISON:  04/10/2024      ACCESSION NUMBER(S):  [id number]      ORDERING CLINICIAN:  GERHARD DOWLING      FINDINGS:  CARDIOMEDIASTINAL SILHOUETTE:  Cardiomediastinal silhouette is normal in size and configuration.          LUNGS:  Lungs are clear.      ABDOMEN:  No remarkable upper abdominal findings.          BONES:  No acute osseous changes.      Impression: No acute cardiopulmonary process.      MACRO:  None      Signed by: Alfreda Yoon 4/12/2024 3:15 PM  Dictation workstation:   [id number]      Physical Exam  Constitutional:       General: He is not in acute distress.     Appearance: He is normal weight. He is ill-appearing. He is not toxic-appearing or diaphoretic.      Comments: Awake, pleasant, confused   HENT:      Head: Normocephalic and atraumatic.      Nose: Nose normal.      Mouth/Throat:      Mouth: Mucous membranes are moist.      Comments: Slight food exudate from recent breakfast  Eyes:      Pupils: Pupils are equal, round, and reactive to light.   Neck:      Comments: The patient does describe slight pain with neck movement  Cardiovascular:      Rate and Rhythm: Normal rate and regular  rhythm.      Pulses: Normal pulses.      Heart sounds: Normal heart sounds.   Pulmonary:      Effort: Pulmonary effort is normal. No respiratory distress.      Breath sounds: Normal breath sounds.   Abdominal:      General: Bowel sounds are normal. There is distension.      Palpations: Abdomen is soft.      Tenderness: There is no abdominal tenderness.      Comments: Mild distention due to body habitus   Genitourinary:     Comments: External urinary catheter in place  Musculoskeletal:         General: No deformity.      Cervical back: Neck supple. Tenderness present.      Right lower leg: Edema present.      Left lower leg: Edema present.      Comments: Mild bilateral lower extremity edema noted, and tenderness to palpation of bilateral lower extremities; the patient does describe tenderness to bilateral shoulders and neck area, more so with palpation, but relieved with repositioning   Skin:     General: Skin is warm and dry.      Coloration: Skin is pale.   Neurological:      Mental Status: He is alert. Mental status is at baseline. He is disoriented.      Motor: Weakness present.      Comments: A&O x 1-2.  Conversant with clear speech and able to participate in conversation.  Able to follow simple commands.  2/5 right grasp with 1/5 left grasp, and does not lift either arm but does move them laterally in bed. 2/5 bilateral lower extremity strength, mostly moving legs slightly laterally in bed.   Psychiatric:         Mood and Affect: Mood normal.         Behavior: Behavior normal.         Thought Content: Thought content normal.      Comments: Without restlessness   Relevant Results  Scheduled medications  atorvastatin, 40 mg, oral, Daily  donepezil, 10 mg, oral, BID  famotidine, 20 mg, oral, Daily  insulin glargine, 17 Units, subcutaneous, q24h  insulin regular, 0-10 Units, subcutaneous, TID with meals  levothyroxine, 150 mcg, oral, Daily before breakfast  lisinopril, 5 mg, oral, Daily  pantoprazole, 20 mg, oral,  Daily before breakfast  polyethylene glycol, 17 g, oral, Daily  spironolactone, 25 mg, oral, Daily  tamsulosin, 0.4 mg, oral, Daily  triamcinolone, , Topical, BID      Continuous medications  dextrose 5%-0.45 % sodium chloride, 50 mL/hr, Last Rate: 50 mL/hr (04/14/24 1341)      PRN medications  PRN medications: acetaminophen, dextrose, dextrose, glucagon, glucagon, hydrALAZINE, ipratropium-albuteroL, ondansetron   Results for orders placed or performed during the hospital encounter of 04/10/24 (from the past 24 hour(s))   POCT GLUCOSE   Result Value Ref Range    POCT Glucose 156 (H) 74 - 99 mg/dL   POCT GLUCOSE   Result Value Ref Range    POCT Glucose 164 (H) 74 - 99 mg/dL   CBC   Result Value Ref Range    WBC 9.5 4.4 - 11.3 x10*3/uL    nRBC 0.0 0.0 - 0.0 /100 WBCs    RBC 3.82 (L) 4.50 - 5.90 x10*6/uL    Hemoglobin 11.3 (L) 13.5 - 17.5 g/dL    Hematocrit 34.8 (L) 41.0 - 52.0 %    MCV 91 80 - 100 fL    MCH 29.6 26.0 - 34.0 pg    MCHC 32.5 32.0 - 36.0 g/dL    RDW 14.1 11.5 - 14.5 %    Platelets 281 150 - 450 x10*3/uL   Comprehensive Metabolic Panel   Result Value Ref Range    Glucose 129 (H) 74 - 99 mg/dL    Sodium 134 (L) 136 - 145 mmol/L    Potassium 4.3 3.5 - 5.3 mmol/L    Chloride 99 98 - 107 mmol/L    Bicarbonate 28 21 - 32 mmol/L    Anion Gap 11 10 - 20 mmol/L    Urea Nitrogen 16 6 - 23 mg/dL    Creatinine 1.09 0.50 - 1.30 mg/dL    eGFR 64 >60 mL/min/1.73m*2    Calcium 8.6 8.6 - 10.3 mg/dL    Albumin 3.5 3.4 - 5.0 g/dL    Alkaline Phosphatase 71 33 - 136 U/L    Total Protein 6.1 (L) 6.4 - 8.2 g/dL    AST 27 9 - 39 U/L    Bilirubin, Total 1.0 0.0 - 1.2 mg/dL    ALT 14 10 - 52 U/L   POCT GLUCOSE   Result Value Ref Range    POCT Glucose 130 (H) 74 - 99 mg/dL   POCT GLUCOSE   Result Value Ref Range    POCT Glucose 194 (H) 74 - 99 mg/dL             Assessment/Plan   This patient currently has cardiac telemetry ordered; if you would like to modify or discontinue the telemetry order, click here to go to the orders  activity to modify/discontinue the order.              Principal Problem:    SDH (subdural hematoma) (Multi)    Patient fully evaluated on April 13, 2024. Family at bedside. Reviewed labs this morning . Will start IV fluids. Will continue to monitor inpatient. Repeat labs in the am.    Patient fully evaluated on April 14.  More animated today.  Lab work stable.  Speech therapy to reevaluate tomorrow.  Recheck labs in AM.         Timo Miranda MD

## 2024-04-14 NOTE — CARE PLAN
The patient's goals for the shift include     Problem: Pain  Goal: My pain/discomfort is manageable  Outcome: Progressing     Problem: Safety  Goal: Patient will be injury free during hospitalization  Outcome: Progressing     Problem: Daily Care  Goal: Daily care needs are met  Outcome: Progressing     Problem: Skin  Goal: Prevent/manage excess moisture  Outcome: Progressing  Flowsheets (Taken 4/13/2024 2231)  Prevent/manage excess moisture:   Cleanse incontinence/protect with barrier cream   Moisturize dry skin  Goal: Promote skin healing  Outcome: Progressing  Flowsheets (Taken 4/13/2024 2231)  Promote skin healing:   Turn/reposition every 2 hours/use positioning/transfer devices   Protective dressings over bony prominences

## 2024-04-15 LAB
ALBUMIN SERPL BCP-MCNC: 3.4 G/DL (ref 3.4–5)
ALP SERPL-CCNC: 68 U/L (ref 33–136)
ALT SERPL W P-5'-P-CCNC: 12 U/L (ref 10–52)
ANION GAP SERPL CALC-SCNC: 9 MMOL/L (ref 10–20)
AST SERPL W P-5'-P-CCNC: 21 U/L (ref 9–39)
BILIRUB SERPL-MCNC: 0.9 MG/DL (ref 0–1.2)
BUN SERPL-MCNC: 13 MG/DL (ref 6–23)
CALCIUM SERPL-MCNC: 8.6 MG/DL (ref 8.6–10.3)
CHLORIDE SERPL-SCNC: 99 MMOL/L (ref 98–107)
CO2 SERPL-SCNC: 30 MMOL/L (ref 21–32)
CREAT SERPL-MCNC: 0.96 MG/DL (ref 0.5–1.3)
EGFRCR SERPLBLD CKD-EPI 2021: 74 ML/MIN/1.73M*2
ERYTHROCYTE [DISTWIDTH] IN BLOOD BY AUTOMATED COUNT: 14 % (ref 11.5–14.5)
GLUCOSE BLD MANUAL STRIP-MCNC: 155 MG/DL (ref 74–99)
GLUCOSE BLD MANUAL STRIP-MCNC: 227 MG/DL (ref 74–99)
GLUCOSE BLD MANUAL STRIP-MCNC: 229 MG/DL (ref 74–99)
GLUCOSE SERPL-MCNC: 87 MG/DL (ref 74–99)
HCT VFR BLD AUTO: 34.3 % (ref 41–52)
HGB BLD-MCNC: 11 G/DL (ref 13.5–17.5)
HOLD SPECIMEN: NORMAL
MCH RBC QN AUTO: 29.3 PG (ref 26–34)
MCHC RBC AUTO-ENTMCNC: 32.1 G/DL (ref 32–36)
MCV RBC AUTO: 92 FL (ref 80–100)
NRBC BLD-RTO: 0 /100 WBCS (ref 0–0)
PLATELET # BLD AUTO: 273 X10*3/UL (ref 150–450)
POTASSIUM SERPL-SCNC: 4.1 MMOL/L (ref 3.5–5.3)
PROT SERPL-MCNC: 6 G/DL (ref 6.4–8.2)
RBC # BLD AUTO: 3.75 X10*6/UL (ref 4.5–5.9)
SODIUM SERPL-SCNC: 134 MMOL/L (ref 136–145)
WBC # BLD AUTO: 7.8 X10*3/UL (ref 4.4–11.3)

## 2024-04-15 PROCEDURE — 92526 ORAL FUNCTION THERAPY: CPT | Mod: GN | Performed by: IN HOME SUPPORTIVE CARE

## 2024-04-15 PROCEDURE — 2060000001 HC INTERMEDIATE ICU ROOM DAILY

## 2024-04-15 PROCEDURE — 2500000002 HC RX 250 W HCPCS SELF ADMINISTERED DRUGS (ALT 637 FOR MEDICARE OP, ALT 636 FOR OP/ED): Performed by: NURSE PRACTITIONER

## 2024-04-15 PROCEDURE — 2500000004 HC RX 250 GENERAL PHARMACY W/ HCPCS (ALT 636 FOR OP/ED): Performed by: INTERNAL MEDICINE

## 2024-04-15 PROCEDURE — 2500000004 HC RX 250 GENERAL PHARMACY W/ HCPCS (ALT 636 FOR OP/ED): Performed by: NURSE PRACTITIONER

## 2024-04-15 PROCEDURE — 85027 COMPLETE CBC AUTOMATED: CPT | Performed by: INTERNAL MEDICINE

## 2024-04-15 PROCEDURE — 82947 ASSAY GLUCOSE BLOOD QUANT: CPT

## 2024-04-15 PROCEDURE — 80053 COMPREHEN METABOLIC PANEL: CPT | Performed by: INTERNAL MEDICINE

## 2024-04-15 PROCEDURE — 2500000001 HC RX 250 WO HCPCS SELF ADMINISTERED DRUGS (ALT 637 FOR MEDICARE OP): Performed by: NURSE PRACTITIONER

## 2024-04-15 PROCEDURE — 36415 COLL VENOUS BLD VENIPUNCTURE: CPT | Performed by: INTERNAL MEDICINE

## 2024-04-15 RX ORDER — TRIAMCINOLONE ACETONIDE 1 MG/G
OINTMENT TOPICAL 2 TIMES DAILY
Start: 2024-04-15

## 2024-04-15 RX ADMIN — LEVOTHYROXINE SODIUM 150 MCG: 150 TABLET ORAL at 09:08

## 2024-04-15 RX ADMIN — DEXTROSE AND SODIUM CHLORIDE 50 ML/HR: 5; 450 INJECTION, SOLUTION INTRAVENOUS at 20:21

## 2024-04-15 RX ADMIN — SPIRONOLACTONE 25 MG: 25 TABLET, FILM COATED ORAL at 09:02

## 2024-04-15 RX ADMIN — DONEPEZIL HYDROCHLORIDE 10 MG: 10 TABLET ORAL at 09:02

## 2024-04-15 RX ADMIN — INSULIN GLARGINE 17 UNITS: 100 INJECTION, SOLUTION SUBCUTANEOUS at 18:00

## 2024-04-15 RX ADMIN — FAMOTIDINE 20 MG: 20 TABLET ORAL at 09:02

## 2024-04-15 RX ADMIN — INSULIN HUMAN 2 UNITS: 100 INJECTION, SOLUTION PARENTERAL at 17:42

## 2024-04-15 RX ADMIN — TRIAMCINOLONE ACETONIDE: 1 OINTMENT TOPICAL at 20:21

## 2024-04-15 RX ADMIN — TAMSULOSIN HYDROCHLORIDE 0.4 MG: 0.4 CAPSULE ORAL at 09:02

## 2024-04-15 RX ADMIN — DEXTROSE AND SODIUM CHLORIDE 50 ML/HR: 5; 450 INJECTION, SOLUTION INTRAVENOUS at 03:48

## 2024-04-15 RX ADMIN — PANTOPRAZOLE SODIUM 20 MG: 20 TABLET, DELAYED RELEASE ORAL at 09:08

## 2024-04-15 RX ADMIN — TRIAMCINOLONE ACETONIDE: 1 OINTMENT TOPICAL at 09:00

## 2024-04-15 RX ADMIN — POLYETHYLENE GLYCOL 3350 17 G: 17 POWDER, FOR SOLUTION ORAL at 09:02

## 2024-04-15 RX ADMIN — INSULIN HUMAN 4 UNITS: 100 INJECTION, SOLUTION PARENTERAL at 12:00

## 2024-04-15 RX ADMIN — ATORVASTATIN CALCIUM 40 MG: 40 TABLET, FILM COATED ORAL at 09:02

## 2024-04-15 RX ADMIN — DONEPEZIL HYDROCHLORIDE 10 MG: 10 TABLET ORAL at 20:21

## 2024-04-15 RX ADMIN — LISINOPRIL 5 MG: 5 TABLET ORAL at 09:02

## 2024-04-15 ASSESSMENT — PAIN SCALES - GENERAL
PAINLEVEL_OUTOF10: 0 - NO PAIN
PAINLEVEL_OUTOF10: 0 - NO PAIN

## 2024-04-15 ASSESSMENT — PAIN - FUNCTIONAL ASSESSMENT
PAIN_FUNCTIONAL_ASSESSMENT: 0-10
PAIN_FUNCTIONAL_ASSESSMENT: 0-10

## 2024-04-15 NOTE — PROGRESS NOTES
"Speech-Language Pathology    SLP Adult Inpatient  Speech-Language Pathology Treatment     Patient Name: Roman Lakhani  MRN: [id number]  Today's Date: 4/15/2024  Time Calculation  Start Time: 1410  Stop Time: 1425  Time Calculation (min): 15 min         Current Problem:   1. SDH (subdural hematoma) (Multi)  insulin regular (HumuLIN R,NovoLIN R) 100 unit/mL injection    triamcinolone (Kenalog) 0.1 % ointment    CBC    Comprehensive metabolic panel        SLP Assessment:  SLP TX Intervention Outcome: Making Progress Towards Goals  Prognosis: Fair  Medical Staff Made Aware: Yes  Strengths: Family/Caregiver Suppport  Barriers: Cognition  Oral stage dysphagia present with delayed onset of swallow.  Benefits from cues to swallow food. Tendency to \"swish\" liquids in mouth prior to initiating swallow response.  Family educated on watching for laryngeal rise prior to presenting next bolus.  Trialed soft solid with adequate mastication; however, high risk for choking d/t talking/praying with food in mouth and transferring bolus posteriorly in oral cavity but not consistently triggering a swallow.   Puree diet remains the most appropriate diet; will continue to trial advanced consistencies as appropriate.    Dysphagia Goals (established 4/12):    Patient will participate in re-assessment of swallowing with SLP for possible diet advancement from NPO as indicated.  - Goal met 4/13.   Pt/caregiver education. - Goal ongoing 4/15; multiple family members present at bedside. Education provided to NSG (Maggie) and family members.   3. Pt will tolerate LRD without overt s/s of aspiration on all p.o. intake given 1:1 assist and strict adherence to swallow controls; GOAL ONGOING      Plan:  Inpatient/Swing Bed or Outpatient: Inpatient  SLP Plan: Skilled SLP  SLP Discharge Recommendations: Continue skilled SLP services at the next level of care  Discussed POC: Patient, Caregiver/family, Nursing  SLP - OK to Discharge: Yes    Subjective " "  Current Problem:  Dysphagia    Most Recent Visit:  SLP Received On: 04/15/24    General Visit Information:   Prior to Session Communication: Bedside nurse  At baseline on a regular diet per family; eats a lot of rice.    Pain Assessment:   Pain Assessment: 0-10  Pain Score: 0 - No pain    Objective     Therapeutic Swallow:  Therapeutic Swallow Intervention : Compensatory Strategies, Caregiver Education  Solid Diet Recommendations: Pureed/extremely thick  (IDDSI Level 4)  Liquid Diet Recommendations: Thin (IDDSI Level 0)  Soft solids with good mastication; however, holding bolus in mouth with praying and talking. Benefited from cues to swallow bolus.   Takes thin liquids via straw sips with good oral control given swish prior to swallow.   No overt or subtle s/s of penetration and/or aspiration.    Inpatient:  Education Documentation    Education:  Learner patient; family; nursing   Barriers to Learning None for family or staff; for patient acuteness of illness barrier; cognitive limitations barrier   Method verbal; written- (Written \"Swallowing Guidelines\" posted at patient's bedside); demonstration   Education - Topic ST provided patient education regarding role of ST, purpose of assessment, clinical impressions, goals of treatment, and plan of care. Education will be reinforced. ST further coordinated with RN regarding recommendations and precautions per this assessment, with RN verbalizing understanding.     Outcome    Verbalized understanding and agreement;  teach back/return demonstration; meets goals/outcomes                        "

## 2024-04-15 NOTE — CARE PLAN
The patient's goals for the shift include safety and comfort    The clinical goals for the shift include safety and comfort      Problem: Pain  Goal: My pain/discomfort is manageable  Outcome: Progressing     Problem: Safety  Goal: Patient will be injury free during hospitalization  Outcome: Progressing  Goal: I will remain free of falls  Outcome: Progressing     Problem: Daily Care  Goal: Daily care needs are met  Outcome: Progressing     Problem: Psychosocial Needs  Goal: Demonstrates ability to cope with hospitalization/illness  Outcome: Progressing  Goal: Collaborate with me, my family, and caregiver to identify my specific goals  Outcome: Progressing     Problem: Discharge Barriers  Goal: My discharge needs are met  Outcome: Progressing     Problem: Skin  Goal: Participates in plan/prevention/treatment measures  Outcome: Progressing  Flowsheets (Taken 4/15/2024 0046)  Participates in plan/prevention/treatment measures: Elevate heels  Goal: Prevent/manage excess moisture  Outcome: Progressing  Flowsheets (Taken 4/15/2024 0046)  Prevent/manage excess moisture: Moisturize dry skin  Goal: Promote skin healing  Outcome: Progressing  Flowsheets (Taken 4/15/2024 0046)  Promote skin healing: Protective dressings over bony prominences

## 2024-04-15 NOTE — PROGRESS NOTES
Met with family at bedside informed them that [place] had not beds available at this time.  Family gave 3 other choices. #1 [facility], #2 [facility], #3 [facility]. Request sent to [facility] to sent over referrals in [facility]. Care coordinators will follow for discharge planning.         Addendum 4/15 [id number]  Spoke with family at bedside. [facility] came and met with patient today. [facility] and [facility] have no beds at this time. Family states they would like to talk with the rest of the family before making the decision to go ahead with [facility]. Family will be in tomorrow morning 4/16. Care coordinators will continue to follow for discharge planning.

## 2024-04-15 NOTE — DISCHARGE SUMMARY
Roman Lakhani is a 89 y.o. male on day 5 of admission presenting with SDH (subdural hematoma) (Multi).      Subjective   Patient fully evaluated on April 15.  Clinically unchanged.  Subdural hematoma results noted and no surgical intervention needed.        Objective     Last Recorded Vitals  /60   Pulse 85   Temp 36.2 °C (97.2 °F) (Temporal)   Resp 18   Wt 81.6 kg (179 lb 14.3 oz)   SpO2 91%   Intake/Output last 3 Shifts:    Intake/Output Summary (Last 24 hours) at 4/15/2024 1439  Last data filed at 4/15/2024 0247  Gross per 24 hour   Intake --   Output 1150 ml   Net -1150 ml       Admission Weight  Weight: 81.6 kg (180 lb) (04/10/24 0452)    Daily Weight  04/12/24 : 81.6 kg (179 lb 14.3 oz)    Image Results  XR chest 1 view  Narrative: Interpreted By:  Alfreda Yoon,   STUDY:  XR CHEST 1 VIEW;  4/12/2024 1:38 pm      INDICATION:  Signs/Symptoms:? aspiration.      COMPARISON:  04/10/2024      ACCESSION NUMBER(S):  [id number]      ORDERING CLINICIAN:  GERHARD DOWLING      FINDINGS:  CARDIOMEDIASTINAL SILHOUETTE:  Cardiomediastinal silhouette is normal in size and configuration.          LUNGS:  Lungs are clear.      ABDOMEN:  No remarkable upper abdominal findings.          BONES:  No acute osseous changes.      Impression: No acute cardiopulmonary process.      MACRO:  None      Signed by: Alfreda Yoon 4/12/2024 3:15 PM  Dictation workstation:   [id number]      Physical Exam  Physical Exam  Vitals and nursing note reviewed.   Constitutional:       General: He is not in acute distress.     Appearance: He is well-developed.   HENT:      Head: Normocephalic and atraumatic.   Eyes:      Conjunctiva/sclera: Conjunctivae normal.   Cardiovascular:      Rate and Rhythm: Normal rate and regular rhythm.      Heart sounds: No murmur heard.  Pulmonary:      Effort: Pulmonary effort is normal. No respiratory distress.      Breath sounds: Normal breath sounds.   Abdominal:      Palpations: Abdomen is  soft.      Tenderness: There is no abdominal tenderness.   Musculoskeletal:         General: No swelling.      Cervical back: Neck supple. No tenderness.      Comments: Tenderness palpation proximal femur.  Appears to have mild pain with movement of the right hip.  No right knee or ankle pain.  He examination including palpation and range of motion of 3 other extremities does not elicit any pain.   Skin:     General: Skin is warm and dry.      Capillary Refill: Capillary refill takes less than 2 seconds.   Neurological:      Mental Status: He is alert.   Psychiatric:         Mood and Affect: Mood normal.         Assessment/Plan   This patient currently has cardiac telemetry ordered; if you would like to modify or discontinue the telemetry order, click here to go to the orders activity to modify/discontinue the order.              Principal Problem:    SDH (subdural hematoma) (Multi)        Timo Miranda MD  Physician  Internal Medicine     Consults      Signed     Date of Service: 4/10/2024 12:58 PM     Signed       Expand All Collapse All    Consults     Reason For Consult  Patient fully evaluated on April 10.     History Of Present Illness  Roman Lakhani is a 89 y.o. male presenting with with fall at his skilled nursing with subdural hematoma..     Past Medical History  He has no past medical history on file.     Surgical History  He has no past surgical history on file.     Social History  He has no history on file for tobacco use, alcohol use, and drug use.     Family History  Family History   No family history on file.        Allergies  Patient has no known allergies.     Review of Systems        Physical Exam           Last Recorded Vitals  /74   Pulse 79   Temp 36.3 °C (97.3 °F)   Resp 18   Wt 81.6 kg (180 lb)   SpO2 94%      Relevant Results           Assessment/Plan            Date of Service: 4/10/2024  7:36 AM           Attested Addendum        Expand All Collapse All    History Of  Present Illness  Roman Lakhani is a 89 y.o. Armenian speaking male with past medical history significant for dementia, CKD, DM, COPD w/ chronic resp failure (O2 dependent), paranoid schizophrenia, cardiomyopathy, anxiety/depression, HTN, HLD, BPH, and GERD who presented to [facility] ED 4/10 from [facility] s/p unwitnessed fall. Majority of history collected from chart and staff d/t dementia and language barrier.      In the ED, patient hemodynamically stable, afebrile. Labs remarkable for WBC 12.3, Na 13, glucose 179. Patient pan scanned with the following significant results: acute on chronic right frontoparietal SDH measuring up to 2.8cm as well as cerebral sulcal effacement, narrowing of the right lateral and 3rd ventricles and 5mm leftward midline shift. ED physician discussed patient with NSGY with recommendations for nonop management, ok for patient to remain at [place]. Patient admitted to trauma service with consult to NSGY for continued management.      A: Airway intact  B: Breathing spontaneously, breath sounds are bilateral and equal  C: Pulses 2+throughout and equal.    D: Pupils equal and reactive, GCS 14 (E4, V4, M6). Moving all 4 extremities  E: Patient exposed and additional injuries noted; Warm blankets placed on patient     Secondary Survey:  NEURO: GCS 14, CN II-XII intact, VERNON equally, muscle strength 5/5  HEAD: NC/AT, No lacerations or abrasions, no bony step offs, midface stable.  EENT: PERRL, EOMI. Pupils 4-2mm b/l. external ear without laceration. Nasal septum midline, no crepitus or septal hematoma. Oral mucosa and tongue without lacerations, teeth in place.   NECK: No cervical spine tenderness or step offs, no lacerations or abrasions, trachea midline. No JVD.  RESPIRATORY/CHEST: No abrasions, contusions, crepitus or tenderness to palpation. Non-labored, equal chest expansion, CTAB, no W/R/R.  CV: RRR. Pulses bilateral: 2+ radial, 2+DP. No TTP of chest  ABDOMEN: soft,  "nontender, mild diffuse tenderness on palpation. No scars, abrasions or lacerations.  PELVIS: Stable to compression.  : nml external genitalia, no blood at urethral meatus  RECTAL: rectal tone deferred.  BACK/SPINE: No thoracic midline tenderness, step-offs or deformities. No lumbar midline tenderness, step-offs, or deformities.  No abrasions, hematomas or lacerations noted.  EXTREMITIES: Mild BLE edema. Nml ROM. Pain on palpation right thigh without obvious outward signs of trauma. No deformities, lacerations or contusions.        Past Medical History  Medical History   No past medical history on file.         Surgical History  Surgical History   No past surgical history on file.         Social History  He has no history on file for tobacco use, alcohol use, and drug use.     Family History  Family History   No family history on file.         Allergies  Patient has no known allergies.     Review of Systems     Physical Exam     Last Recorded Vitals  Blood pressure 133/71, pulse 84, temperature 36.6 °C (97.9 °F), resp. rate 18, height 1.676 m (5' 6\"), weight 81.6 kg (180 lb), SpO2 98%.     Relevant Results                     Results for orders placed or performed during the hospital encounter of 04/10/24 (from the past 24 hour(s))   CBC and Auto Differential   Result Value Ref Range     WBC 12.3 (H) 4.4 - 11.3 x10*3/uL     nRBC 0.0 0.0 - 0.0 /100 WBCs     RBC 3.97 (L) 4.50 - 5.90 x10*6/uL     Hemoglobin 11.8 (L) 13.5 - 17.5 g/dL     Hematocrit 36.4 (L) 41.0 - 52.0 %     MCV 92 80 - 100 fL     MCH 29.7 26.0 - 34.0 pg     MCHC 32.4 32.0 - 36.0 g/dL     RDW 14.0 11.5 - 14.5 %     Platelets 270 150 - 450 x10*3/uL     Neutrophils % 80.6 40.0 - 80.0 %     Immature Granulocytes %, Automated 0.2 0.0 - 0.9 %     Lymphocytes % 12.0 13.0 - 44.0 %     Monocytes % 5.9 2.0 - 10.0 %     Eosinophils % 0.9 0.0 - 6.0 %     Basophils % 0.4 0.0 - 2.0 %     Neutrophils Absolute 9.88 (H) 1.60 - 5.50 x10*3/uL     Immature Granulocytes " Absolute, Automated 0.03 0.00 - 0.50 x10*3/uL     Lymphocytes Absolute 1.47 0.80 - 3.00 x10*3/uL     Monocytes Absolute 0.73 0.05 - 0.80 x10*3/uL     Eosinophils Absolute 0.11 0.00 - 0.40 x10*3/uL     Basophils Absolute 0.05 0.00 - 0.10 x10*3/uL   Comprehensive metabolic panel   Result Value Ref Range     Glucose 179 (H) 74 - 99 mg/dL     Sodium 133 (L) 136 - 145 mmol/L     Potassium 4.7 3.5 - 5.3 mmol/L     Chloride 98 98 - 107 mmol/L     Bicarbonate 23 21 - 32 mmol/L     Anion Gap 17 10 - 20 mmol/L     Urea Nitrogen 16 6 - 23 mg/dL     Creatinine 1.17 0.50 - 1.30 mg/dL     eGFR 58 (L) >60 mL/min/1.73m*2     Calcium 9.5 8.6 - 10.3 mg/dL     Albumin 4.3 3.4 - 5.0 g/dL     Alkaline Phosphatase 86 33 - 136 U/L     Total Protein 7.0 6.4 - 8.2 g/dL     AST 23 9 - 39 U/L     Bilirubin, Total 1.5 (H) 0.0 - 1.2 mg/dL     ALT 12 10 - 52 U/L   aPTT   Result Value Ref Range     aPTT 30 27 - 38 seconds   Protime-INR   Result Value Ref Range     Protime 11.7 9.8 - 12.8 seconds     INR 1.0 0.9 - 1.1   Type and Screen   Result Value Ref Range     ABO TYPE O       Rh TYPE POS       ANTIBODY SCREEN NEG        XR femur right 2+ views     Result Date: 4/10/2024  STUDY: Femur Radiographs; 4/10/2024 6:12 AM INDICATION: Trauma. COMPARISON: None Available. ACCESSION NUMBER(S): [id number] ORDERING CLINICIAN: IRVING BECERRA TECHNIQUE:  Four view(s) of the right femur. FINDINGS:  Right-sided superior and inferior pubic rami and ischium normal. No fracture. The iliopectineal line on the right is normal. The femoral head is localized normally in the acetabular fossa. Femoral head appears to be normal. Femoral neck and intertrochanteric region appear to be normal. No fracture or trabecular line disruption. The femoral shaft appears to be normal. There are vessel calcifications identified. Small knee effusion suspected. Patellofemoral, medial and lateral compartment severe degenerative changes are identified. No definite evidence of fracture  involving the distal femoral shaft or the proximal tibial plateau or proximal fibula in their limited visualized extent.     No fracture or subluxation of the right femur. Severe tricompartmental degenerative changes of the right knee. Small knee effusion suspected. Signed by Godwin Riggs MD     XR pelvis 1-2 views     Result Date: 4/10/2024  STUDY: Pelvis Radiographs; 4/10/2024 6:12 AM INDICATION: Trauma. COMPARISON: None Available. ACCESSION NUMBER(S): [id number] ORDERING CLINICIAN: IRVING BECERRA TECHNIQUE:  One view(s) of the pelvis. FINDINGS:  Lower lumbar spine degenerative changes. Iliac wings normal with esthesiopathic changes. SI joint show degenerative changes. Bilateral hip DJD.  Superior and inferior pubic rami normal. Both hips appear to be localized normally in the acetabulum. Intertrochanteric region normal. Femoral head normal. Femoral neck appears normal. No distinct evidence of fracture identified trabecular lines appear to be normal. Proximal femoral shafts appear normal. Visualized portions of the pelvis remarkable for phleboliths.     Degenerative changes of the lower lumbar spine, hips  and SI joints. No evidence of fracture or subluxation. Signed by Godwin Riggs MD     XR chest 1 view     Result Date: 4/10/2024  STUDY: Chest Radiograph;  4/10/2024 6:12 AM INDICATION: Cough. COMPARISON: None Available. ACCESSION NUMBER(S): [id number] ORDERING CLINICIAN: IRVING BECERRA TECHNIQUE:  Frontal chest was obtained at 06:12 hours. FINDINGS: CARDIOMEDIASTINAL SILHOUETTE: Cardiomediastinal silhouette is normal in size and configuration. Calcified mediastinal lymph nodes. Airway normal.  LUNGS: Lungs are clear. Basilar atelectasis and scarring. No pneumothorax. No focal nodule  ABDOMEN: No remarkable upper abdominal findings. No free air.  BONES: No acute osseous changes. Degenerative changes of both shoulders and thoracic spine. No focal rib abnormality is identified.     Normal size heart.  Evidence of prior granulomatous disease. Basilar atelectatic changes. No focal infiltrate. Signed by Godwin Riggs MD     CT head wo IV contrast     Result Date: 4/10/2024  Interpreted By:  Arnulfo Moncada, STUDY: CT HEAD WO IV CONTRAST; CT CERVICAL SPINE WO IV CONTRAST; ; 4/10/2024 6:00 am   INDICATION: Signs/Symptoms:trauma.   COMPARISON: None.   ACCESSION NUMBER(S): [id number]; [id number]   ORDERING CLINICIAN: IRVING BECERRA   TECHNIQUE: Noncontrast CT exams of the head and cervical spine with multiplanar reformations.   FINDINGS: BRAIN PARENCHYMA: Gray-white matter interfaces are preserved. 5 mm of leftward midline shift at the level of foramen of Monro.   HEMORRHAGE: There is an acute on chronic right frontoparietal subdural hematoma measuring up to 2.8 cm in maximal thickness along the anterolateral frontal convexity. VENTRICLES and EXTRA-AXIAL SPACES: There is cerebral sulcal effacement. Asymmetric narrowing of the right lateral ventricle and narrowing of the 3rd ventricle. No definite hydrocephalus. EXTRACRANIAL SOFT TISSUES: Small right posterior scalp hematoma. PARANASAL SINUSES/MASTOIDS: The visualized paranasal sinuses and mastoid air cells are aerated. CALVARIUM: No depressed skull fracture. No destructive osseous lesion.   OTHER FINDINGS: None.   CERVICAL SPINE:   Mild reversal of the normal cervical lordosis could reflect positioning or muscle spasm. ALIGNMENT: Grade 1 degenerative anterolisthesis at C2-C3 and C3-C4. Trace degenerative anterolisthesis at C7-T1. Alignment is otherwise maintained. VERTEBRAE: No acute fracture is seen. Vertebral stature is maintained. Degenerative endplate osteophytosis and uncovertebral hypertrophy in conjunction with moderate to severe degenerative disc height loss, overall worst at C7-T1. Moderate to severe multilevel hypertrophic facet osteoarthropathy. SPINAL CANAL: No critical spinal canal stenosis. PREVERTEBRAL SOFT TISSUES: No prevertebral soft tissue  swelling. LUNG APICES: Imaged portion of the lung apices are within normal limits.   OTHER FINDINGS: None.        There is an acute on chronic right frontoparietal subdural hematoma measuring up to 2.8 cm in maximal thickness along the anterolateral frontal convexity. There is cerebral sulcal effacement, narrowing of the right lateral and 3rd ventricles and 5 mm of leftward midline shift. Neurosurgical evaluation recommended.   No acute cervical spine fracture or malalignment. Severe spondylosis.     MACRO: Arnulfo Moncada discussed the significance and urgency of this critical finding by epic secure chat with  IRVING BECERRA on 4/10/2024 at 6:25 am.  (**-RCF-**) Findings:  See findings.     Signed by: Arnulfo Moncada 4/10/2024 6:25 AM Dictation workstation:   [id number]     CT cervical spine wo IV contrast     Result Date: 4/10/2024  Interpreted By:  Arnulfo Moncada, STUDY: CT HEAD WO IV CONTRAST; CT CERVICAL SPINE WO IV CONTRAST; ; 4/10/2024 6:00 am   INDICATION: Signs/Symptoms:trauma.   COMPARISON: None.   ACCESSION NUMBER(S): [id number]; [id number]   ORDERING CLINICIAN: IRVING BECERRA   TECHNIQUE: Noncontrast CT exams of the head and cervical spine with multiplanar reformations.   FINDINGS: BRAIN PARENCHYMA: Gray-white matter interfaces are preserved. 5 mm of leftward midline shift at the level of foramen of Monro.   HEMORRHAGE: There is an acute on chronic right frontoparietal subdural hematoma measuring up to 2.8 cm in maximal thickness along the anterolateral frontal convexity. VENTRICLES and EXTRA-AXIAL SPACES: There is cerebral sulcal effacement. Asymmetric narrowing of the right lateral ventricle and narrowing of the 3rd ventricle. No definite hydrocephalus. EXTRACRANIAL SOFT TISSUES: Small right posterior scalp hematoma. PARANASAL SINUSES/MASTOIDS: The visualized paranasal sinuses and mastoid air cells are aerated. CALVARIUM: No depressed skull fracture. No destructive osseous lesion.   OTHER FINDINGS: None.    CERVICAL SPINE:   Mild reversal of the normal cervical lordosis could reflect positioning or muscle spasm. ALIGNMENT: Grade 1 degenerative anterolisthesis at C2-C3 and C3-C4. Trace degenerative anterolisthesis at C7-T1. Alignment is otherwise maintained. VERTEBRAE: No acute fracture is seen. Vertebral stature is maintained. Degenerative endplate osteophytosis and uncovertebral hypertrophy in conjunction with moderate to severe degenerative disc height loss, overall worst at C7-T1. Moderate to severe multilevel hypertrophic facet osteoarthropathy. SPINAL CANAL: No critical spinal canal stenosis. PREVERTEBRAL SOFT TISSUES: No prevertebral soft tissue swelling. LUNG APICES: Imaged portion of the lung apices are within normal limits.   OTHER FINDINGS: None.        There is an acute on chronic right frontoparietal subdural hematoma measuring up to 2.8 cm in maximal thickness along the anterolateral frontal convexity. There is cerebral sulcal effacement, narrowing of the right lateral and 3rd ventricles and 5 mm of leftward midline shift. Neurosurgical evaluation recommended.   No acute cervical spine fracture or malalignment. Severe spondylosis.     MACRO: Arnulfo Moncada discussed the significance and urgency of this critical finding by epic secure chat with  IRVING BECERRA on 4/10/2024 at 6:25 am.  (**-RCF-**) Findings:  See findings.     Signed by: Arnulfo Moncada 4/10/2024 6:25 AM Dictation workstation:   [id number]            Assessment/Plan   Principal Problem:    SDH (subdural hematoma) (CMS/[facility])     Roman Lakhani is a 89 y.o. Surinamese speaking male with past medical history significant for dementia, CKD, DM, COPD w/ chronic resp failure (O2 dependent), paranoid schizophrenia, cardiomyopathy, anxiety/depression, HTN, HLD, BPH, and GERD who presented to [facility] ED 4/10 from [facility] s/p unwitnessed fall. Patient pan scanned with the following significant results: acute on chronic right frontoparietal  SDH measuring up to 2.8cm as well as cerebral sulcal effacement, narrowing of the right lateral and 3rd ventricles and 5mm leftward midline shift. Patient admitted to trauma service with consult to NSGY for continued management.      List of clinically significant injuries/problems:  Acute on chronic 2.8 cm SDH with 5 mm leftward midline shift  Small right knee effusion with severe tricompartmental degenerative changes  Leukocytosis  Hyperglycemia  Bed bugs     Assessment/Plan:     # SDH  - NSGY consulted, appreciate input  - pending repeat head CT at 1230     -> NPO pending stability of repeat CT  - maintain HOB >30 degrees  - maintain SBP <160  - Q4H neurochecks  - avoid narcotics if possible given hx dementia with TBI d/t concern for development of delirium  - acetaminophen prn for pain  - PT/OT     # Leukocytosis  - likely reactive  - if uptrending will look for additional causes  - am labs     # Dementia  # DM  # CKD  # COPD  # Chronic resp failure (O2 dependent)  # HTN  # HLD  - medicine consulted  - ISS while inpatient  - restart home meds when med rec complete and medically appropriate      # Bed bugs  - contact precautions     # DVT ppx  - hold until cleared by NSGY  - SCD's     Plan not finalized until discussed with Dr. Pickens                 I spent 60 minutes in the professional and overall care of this patient.                                           Cosigned by:         Revision History    Patient fully evaluated on April 12.  Patient more awake and alert this afternoon was minimally responsive earlier.  Patient has a rash over his forearms consistent with eczema.  Questionable bedbug was found in the emergency room and awaiting pathologic evaluation.  I had fully examined the patient and found no further bedbugs at this time.  Recommend triamcinolone cream twice daily to areas of eczema.  Recheck labs in AM.  Further neurochecks overnight.                                        Patient Fully  evaluated 4/15/2024. Patient signed off by neurosurgery and ready for Discharge. Continue to monitor neurologic examination.  Recheck labs in AM.            Physical Exam  Vitals and nursing note reviewed.   Constitutional:       General: He is not in acute distress.     Appearance: He is well-developed.   HENT:      Head: Normocephalic and atraumatic.   Eyes:      Conjunctiva/sclera: Conjunctivae normal.   Cardiovascular:      Rate and Rhythm: Normal rate and regular rhythm.      Heart sounds: No murmur heard.  Pulmonary:      Effort: Pulmonary effort is normal. No respiratory distress.      Breath sounds: Normal breath sounds.   Abdominal:      Palpations: Abdomen is soft.      Tenderness: There is no abdominal tenderness.   Musculoskeletal:         General: No swelling.      Cervical back: Neck supple. No tenderness.      Comments: Tenderness palpation proximal femur.  Appears to have mild pain with movement of the right hip.  No right knee or ankle pain.  He examination including palpation and range of motion of 3 other extremities does not elicit any pain.   Skin:     General: Skin is warm and dry.      Capillary Refill: Capillary refill takes less than 2 seconds.   Neurological:      Mental Status: He is alert.   Psychiatric:         Mood and Affect: Mood normal.     JOSIE BARAKAT

## 2024-04-16 LAB
GLUCOSE BLD MANUAL STRIP-MCNC: 118 MG/DL (ref 74–99)
GLUCOSE BLD MANUAL STRIP-MCNC: 163 MG/DL (ref 74–99)
GLUCOSE BLD MANUAL STRIP-MCNC: 215 MG/DL (ref 74–99)
GLUCOSE BLD MANUAL STRIP-MCNC: 232 MG/DL (ref 74–99)

## 2024-04-16 PROCEDURE — 92526 ORAL FUNCTION THERAPY: CPT | Mod: GN | Performed by: IN HOME SUPPORTIVE CARE

## 2024-04-16 PROCEDURE — 2500000004 HC RX 250 GENERAL PHARMACY W/ HCPCS (ALT 636 FOR OP/ED): Performed by: NURSE PRACTITIONER

## 2024-04-16 PROCEDURE — 2500000002 HC RX 250 W HCPCS SELF ADMINISTERED DRUGS (ALT 637 FOR MEDICARE OP, ALT 636 FOR OP/ED): Mod: MUE | Performed by: NURSE PRACTITIONER

## 2024-04-16 PROCEDURE — 97535 SELF CARE MNGMENT TRAINING: CPT | Mod: GP,CQ

## 2024-04-16 PROCEDURE — 82947 ASSAY GLUCOSE BLOOD QUANT: CPT

## 2024-04-16 PROCEDURE — 2500000002 HC RX 250 W HCPCS SELF ADMINISTERED DRUGS (ALT 637 FOR MEDICARE OP, ALT 636 FOR OP/ED): Performed by: NURSE PRACTITIONER

## 2024-04-16 PROCEDURE — 2500000001 HC RX 250 WO HCPCS SELF ADMINISTERED DRUGS (ALT 637 FOR MEDICARE OP): Performed by: NURSE PRACTITIONER

## 2024-04-16 PROCEDURE — 2060000001 HC INTERMEDIATE ICU ROOM DAILY

## 2024-04-16 PROCEDURE — 97535 SELF CARE MNGMENT TRAINING: CPT | Mod: CO,GO

## 2024-04-16 RX ADMIN — TRIAMCINOLONE ACETONIDE: 1 OINTMENT TOPICAL at 20:39

## 2024-04-16 RX ADMIN — INSULIN GLARGINE 17 UNITS: 100 INJECTION, SOLUTION SUBCUTANEOUS at 20:34

## 2024-04-16 RX ADMIN — LEVOTHYROXINE SODIUM 150 MCG: 150 TABLET ORAL at 08:30

## 2024-04-16 RX ADMIN — DONEPEZIL HYDROCHLORIDE 10 MG: 10 TABLET ORAL at 20:34

## 2024-04-16 RX ADMIN — ATORVASTATIN CALCIUM 40 MG: 40 TABLET, FILM COATED ORAL at 08:30

## 2024-04-16 RX ADMIN — TRIAMCINOLONE ACETONIDE: 1 OINTMENT TOPICAL at 08:31

## 2024-04-16 RX ADMIN — INSULIN HUMAN 4 UNITS: 100 INJECTION, SOLUTION PARENTERAL at 17:16

## 2024-04-16 RX ADMIN — LISINOPRIL 5 MG: 5 TABLET ORAL at 08:30

## 2024-04-16 RX ADMIN — SPIRONOLACTONE 25 MG: 25 TABLET, FILM COATED ORAL at 08:30

## 2024-04-16 RX ADMIN — HYDRALAZINE HYDROCHLORIDE 5 MG: 20 INJECTION INTRAMUSCULAR; INTRAVENOUS at 00:12

## 2024-04-16 RX ADMIN — FAMOTIDINE 20 MG: 20 TABLET ORAL at 08:30

## 2024-04-16 RX ADMIN — POLYETHYLENE GLYCOL 3350 17 G: 17 POWDER, FOR SOLUTION ORAL at 08:31

## 2024-04-16 RX ADMIN — DONEPEZIL HYDROCHLORIDE 10 MG: 10 TABLET ORAL at 08:30

## 2024-04-16 RX ADMIN — PANTOPRAZOLE SODIUM 20 MG: 20 TABLET, DELAYED RELEASE ORAL at 08:30

## 2024-04-16 RX ADMIN — TAMSULOSIN HYDROCHLORIDE 0.4 MG: 0.4 CAPSULE ORAL at 08:30

## 2024-04-16 ASSESSMENT — COGNITIVE AND FUNCTIONAL STATUS - GENERAL
MOBILITY SCORE: 6
HELP NEEDED FOR BATHING: TOTAL
CLIMB 3 TO 5 STEPS WITH RAILING: TOTAL
TOILETING: TOTAL
MOVING FROM LYING ON BACK TO SITTING ON SIDE OF FLAT BED WITH BEDRAILS: TOTAL
CLIMB 3 TO 5 STEPS WITH RAILING: TOTAL
MOVING TO AND FROM BED TO CHAIR: TOTAL
MOBILITY SCORE: 6
EATING MEALS: A LOT
DRESSING REGULAR UPPER BODY CLOTHING: A LOT
EATING MEALS: A LOT
PERSONAL GROOMING: A LOT
MOVING TO AND FROM BED TO CHAIR: TOTAL
HELP NEEDED FOR BATHING: TOTAL
DRESSING REGULAR UPPER BODY CLOTHING: A LOT
TURNING FROM BACK TO SIDE WHILE IN FLAT BAD: TOTAL
DRESSING REGULAR LOWER BODY CLOTHING: TOTAL
STANDING UP FROM CHAIR USING ARMS: TOTAL
DAILY ACTIVITIY SCORE: 9
DRESSING REGULAR LOWER BODY CLOTHING: TOTAL
TURNING FROM BACK TO SIDE WHILE IN FLAT BAD: TOTAL
MOVING FROM LYING ON BACK TO SITTING ON SIDE OF FLAT BED WITH BEDRAILS: TOTAL
PERSONAL GROOMING: A LOT
DAILY ACTIVITIY SCORE: 9
TOILETING: TOTAL
WALKING IN HOSPITAL ROOM: TOTAL
STANDING UP FROM CHAIR USING ARMS: TOTAL
WALKING IN HOSPITAL ROOM: TOTAL

## 2024-04-16 ASSESSMENT — PAIN SCALES - GENERAL
PAINLEVEL_OUTOF10: 0 - NO PAIN

## 2024-04-16 ASSESSMENT — PAIN - FUNCTIONAL ASSESSMENT: PAIN_FUNCTIONAL_ASSESSMENT: 0-10

## 2024-04-16 ASSESSMENT — ACTIVITIES OF DAILY LIVING (ADL): HOME_MANAGEMENT_TIME_ENTRY: 15

## 2024-04-16 NOTE — PROGRESS NOTES
Physical Therapy    Physical Therapy Treatment    Patient Name: Roman Lakhani  MRN: [id number]  Today's Date: 4/16/2024  Time Calculation  Start Time: 1008  Stop Time: 1034  Time Calculation (min): 26 min       Assessment/Plan   PT Assessment  End of Session Communication: Bedside nurse  End of Session Patient Position: Bed, 2 rail up, Alarm on     PT Plan  Treatment/Interventions: Bed mobility, Transfer training, Gait training, Balance training, Range of motion, Therapeutic exercise, Therapeutic activity  PT Plan: Skilled PT  PT Frequency: 3 times per week  PT Discharge Recommendations: Moderate intensity level of continued care  PT Recommended Transfer Status: Total assist  PT - OK to Discharge: Yes (to next level of care when cleared by medical team)      General Visit Information:   PT  Visit  PT Received On: 04/16/24  General  Family/Caregiver Present:  (multiple family members at bedside throughout tx session)  Co-Treatment: co-tx with OT required due to pt's decreased activity tolerance, ensuring safe therapeutic tx to facilitate maximum participation with skilled intervention  Prior to Session Communication: Bedside nurse  Patient Position Received: Bed, 2 rail up, Alarm on  General Comment:  (pt lethargic though pleasant and agreeable to participate in therapy session.  pt continually rubbing R eye; communicated with RN.)    Subjective   Precautions:  Precautions  Precautions Comment: fall, alarm, language-Libyan, elevate hob 30deg     Objective      Treatments:  Therapeutic Exercise  Therapeutic Exercise Performed:  (seated BLE PROM a few reps ea.; limited by chronic R knee pain.  pt actively able to wiggle toes.)    Bed Mobility  Bed Mobility:  (sup <> sit with dep A x 2.  pt able to sit EOB >/=10 min..  tending to lean posteriorly and to L-side.  brief bouts of up to SBA/CGA however largely mod/max A required to maintain static sitting balance.  pt also then beginning to lean anteriorly upon  fatiguing.   upon returning back supine, pt then able to perform rolling R/L with dep A x 2 using bedrails to assist.)    Transfers  Transfer:  (x2 attempts for sit <> stand with max A x 2-3 however pt unable to clear bed.)    Outcome Measures:  [facility] Basic Mobility  Turning from your back to your side while in a flat bed without using bedrails: Total  Moving from lying on your back to sitting on the side of a flat bed without using bedrails: Total  Moving to and from bed to chair (including a wheelchair): Total  Standing up from a chair using your arms (e.g. wheelchair or bedside chair): Total  To walk in hospital room: Total  Climbing 3-5 steps with railing: Total  Basic Mobility - Total Score: 6    Education Documentation  Mobility Training, taught by Jackie Rivero PTA at 4/16/2024 11:34 AM.  Learner: Patient  Readiness: Acceptance  Method: Explanation, Demonstration  Response: Needs Reinforcement    Mobility Training, taught by Jackie Rivero PTA at 4/16/2024 11:34 AM.  Learner: Patient  Readiness: Acceptance  Method: Explanation  Response: Verbalizes Understanding, Needs Reinforcement    Education Comments  No comments found.        EDUCATION:       Encounter Problems       Encounter Problems (Active)       PT Problem       bed mobility  (Progressing)       Start:  04/12/24    Expected End:  04/20/24       Mod assist x1 supine<>sit         transfers  (Not Progressing)       Start:  04/12/24    Expected End:  04/20/24       Mod assist x1 sit<>stand w/ use of appropriate assistive device         gait   (Not Progressing)       Start:  04/12/24    Expected End:  04/20/24       Mod assist x2 >=10ftx2 w/ use of appropriate assistive device          balance   (Progressing)       Start:  04/12/24    Expected End:  04/20/24       Cga maintaining sitting supported balance while performing >=10reps x2 sets ble therex to facilitate inc fxl mobility& gait stability

## 2024-04-16 NOTE — CARE PLAN
Problem: Pain  Goal: My pain/discomfort is manageable  Outcome: Progressing   The patient's goals for the shift include safety and comfort    The clinical goals for the shift include rest

## 2024-04-16 NOTE — CARE PLAN
The patient's goals for the shift include safety and comfort    The clinical goals for the shift include comfort

## 2024-04-16 NOTE — PROGRESS NOTES
Occupational Therapy    OT Treatment    Patient Name: Roman Lakhani  MRN: [id number]  Today's Date: 4/16/2024  Time Calculation  Start Time: 1007  Stop Time: 1031  Time Calculation (min): 24 min         Assessment:  End of Session Communication: Bedside nurse  End of Session Patient Position: Bed, 2 rail up, Alarm on     Plan:  Treatment Interventions: ADL retraining, Functional transfer training, UE strengthening/ROM, Endurance training, Equipment evaluation/education, Patient/family training, Cognitive reorientation, Compensatory technique education  OT Frequency: 3 times per week  OT Discharge Recommendations: Moderate intensity level of continued care  OT - OK to Discharge: Yes (once medically appropriate to next level of care)  Treatment Interventions: ADL retraining, Functional transfer training, UE strengthening/ROM, Endurance training, Equipment evaluation/education, Patient/family training, Cognitive reorientation, Compensatory technique education    Subjective   Previous Visit Info:  OT Last Visit  OT Received On: 04/16/24  General:  General  Family/Caregiver Present: Yes (family members present and translated throughout tx session)  Co-Treatment: PT  Co-Treatment Reason: maximize pt safety & mobility  Prior to Session Communication: Bedside nurse  Patient Position Received: Bed, 2 rail up, Alarm on  General Comment: pt lethgaric but in agreement to tx session  Precautions:  Precautions Comment: fall, alarm, language-Surinamese, elevate hob 30deg         Objective         Activities of Daily Living: Grooming  Grooming Level of Assistance: Minimum assistance  Grooming Where Assessed: Bed level  Grooming Comments: to wash face, vc given by family for thoroughness but still required Min A  Functional Standing Tolerance:     Bed Mobility/Transfers: Bed Mobility  Bed Mobility: Yes  Bed Mobility 1  Bed Mobility 1: Supine to sitting, Sitting to supine  Level of Assistance 1: Dependent  (x2)    Transfers  Transfer: No (attempted to complete sit to stand from EOB x2 trials but unable to complete, not clearing bed with Max A x2-3)    Sitting Balance:  Static Sitting Balance  Static Sitting-Balance Support: Bilateral upper extremity supported  Static Sitting-Level of Assistance:  (pt leaning backwards and pushing with R  UE with excessive leaning to L side and unable to self correct without assistance. pt required Max A for 90% of sitting with occasional CGA when pt placed UE's on knees and forward flexion in trunk)      Outcome Measures:[facility] Daily Activity  Putting on and taking off regular lower body clothing: Total  Bathing (including washing, rinsing, drying): Total  Putting on and taking off regular upper body clothing: A lot  Toileting, which includes using toilet, bedpan or urinal: Total  Taking care of personal grooming such as brushing teeth: A lot  Eating Meals: A lot  Daily Activity - Total Score: 9        Education Documentation  Body Mechanics, taught by CARLITOS Jackson at 4/16/2024  2:39 PM.  Learner: Family, Patient  Readiness: Acceptance  Method: Explanation  Response: Verbalizes Understanding    Education Comments  No comments found.            Goals:  Encounter Problems       Encounter Problems (Active)       OT Goals       STG- patient will complete bed mobility (supine <-->sit ) with MOD A with use of bed rails in preparation for ADLs and functional transfers/mobility  (Progressing)       Start:  04/12/24    Expected End:  04/20/24            STG- patient will complete simple mobility/side steps with MOD A x 2 with use of ae/ad/dme prn (Progressing)       Start:  04/12/24    Expected End:  04/20/24            STG- patient will complete transfers to/from bed, chair, commode at MOD A x 2 with use of ae/ad/dme prn (Progressing)       Start:  04/12/24    Expected End:  04/20/24            STG- patient will complete grooming tasks with MIN A with use of ae/ad/dme prn  (Progressing)       Start:  04/12/24    Expected End:  04/20/24            STG- patient will complete UB dressing with MIN A with use of ae/ad/dme prn (Progressing)       Start:  04/12/24    Expected End:  04/20/24

## 2024-04-16 NOTE — PROGRESS NOTES
Speech-Language Pathology    SLP Adult Inpatient  Speech-Language Pathology Treatment     Patient Name: Roman Lakhani  MRN: [id number]  Today's Date: 4/16/2024  Time Calculation  Start Time: 1215  Stop Time: 1230  Time Calculation (min): 15 min         Current Problem:   1. SDH (subdural hematoma) (Multi)  insulin regular (HumuLIN R,NovoLIN R) 100 unit/mL injection    triamcinolone (Kenalog) 0.1 % ointment    CBC    Comprehensive metabolic panel          SLP Assessment:  SLP TX Intervention Outcome: Making Progress Towards Goals  Prognosis: Fair  Medical Staff Made Aware: Yes  Strengths: Family/Caregiver Suppport  Barriers: Cognition  Patient sleeping soundly.  Daughter present at bedside.  Reviewed education from yesterday; demonstrates good understanding of safer swallow strategies.  Reports good tolerance of lunch meal this date with family providing all p.o.    Dysphagia Goals (established 4/12):    Patient will participate in re-assessment of swallowing with SLP for possible diet advancement from NPO as indicated.  - Goal met 4/13.   Pt/caregiver education. - Goal met 4/16; multiple family members present at bedside. Education provided to NSG (Maggie) and family members.   3. Pt will tolerate LRD without overt s/s of aspiration on all p.o. intake given 1:1 assist and strict adherence to swallow controls; GOAL MET      Plan:  Inpatient/Swing Bed or Outpatient: Inpatient  SLP Plan: Skilled SLP  SLP Discharge Recommendations: Continue skilled SLP services at the next level of care  Discussed POC: Caregiver/family  SLP - OK to Discharge: Yes  Planned for discharge to SNF; will discharge at this time with recommended f/up by SNF SLP.    Subjective   Current Problem:  Oral dysphagia    Most Recent Visit:  SLP Received On: 04/16/24    General Visit Information:   Prior to Session Communication: Bedside nurse    Pain Assessment:   Pain Assessment: 0-10  Pain Score: 0 - No pain      Objective     Therapeutic  Swallow:  Therapeutic Swallow Intervention : Caregiver Education  Solid Diet Recommendations: Pureed/extremely thick  (IDDSI Level 4)  Liquid Diet Recommendations: Thin (IDDSI Level 0)

## 2024-04-16 NOTE — DISCHARGE SUMMARY
Roman Lakhani is a 89 y.o. male on day 6 of admission presenting with SDH (subdural hematoma) (Multi).      Subjective   Patient fully evaluated on April 16.  Clinically unchanged.  Subdural hematoma results noted and no surgical intervention needed.        Objective     Last Recorded Vitals  BP (!) 179/99 (BP Location: Left arm, Patient Position: Lying)   Pulse 77   Temp 36.1 °C (97 °F) (Temporal)   Resp 20   Wt 81.6 kg (179 lb 14.3 oz)   SpO2 92%   Intake/Output last 3 Shifts:    Intake/Output Summary (Last 24 hours) at 4/16/2024 1005  Last data filed at 4/16/2024 0852  Gross per 24 hour   Intake 2159.17 ml   Output 600 ml   Net 1559.17 ml       Admission Weight  Weight: 81.6 kg (180 lb) (04/10/24 0452)    Daily Weight  04/15/24 : 81.6 kg (179 lb 14.3 oz)    Image Results  XR chest 1 view  Narrative: Interpreted By:  Alfreda Yoon,   STUDY:  XR CHEST 1 VIEW;  4/12/2024 1:38 pm      INDICATION:  Signs/Symptoms:? aspiration.      COMPARISON:  04/10/2024      ACCESSION NUMBER(S):  [id number]      ORDERING CLINICIAN:  GERHARD DOWLING      FINDINGS:  CARDIOMEDIASTINAL SILHOUETTE:  Cardiomediastinal silhouette is normal in size and configuration.          LUNGS:  Lungs are clear.      ABDOMEN:  No remarkable upper abdominal findings.          BONES:  No acute osseous changes.      Impression: No acute cardiopulmonary process.      MACRO:  None      Signed by: Alfreda Yoon 4/12/2024 3:15 PM  Dictation workstation:   [id number]      Physical Exam  Physical Exam  Vitals and nursing note reviewed.   Constitutional:       General: He is not in acute distress.     Appearance: He is well-developed.   HENT:      Head: Normocephalic and atraumatic.   Eyes:      Conjunctiva/sclera: Conjunctivae normal.   Cardiovascular:      Rate and Rhythm: Normal rate and regular rhythm.      Heart sounds: No murmur heard.  Pulmonary:      Effort: Pulmonary effort is normal. No respiratory distress.      Breath sounds:  Normal breath sounds.   Abdominal:      Palpations: Abdomen is soft.      Tenderness: There is no abdominal tenderness.   Musculoskeletal:         General: No swelling.      Cervical back: Neck supple. No tenderness.      Comments: Tenderness palpation proximal femur.  Appears to have mild pain with movement of the right hip.  No right knee or ankle pain.  He examination including palpation and range of motion of 3 other extremities does not elicit any pain.   Skin:     General: Skin is warm and dry.      Capillary Refill: Capillary refill takes less than 2 seconds.   Neurological:      Mental Status: He is alert.   Psychiatric:         Mood and Affect: Mood normal.         Assessment/Plan   This patient currently has cardiac telemetry ordered; if you would like to modify or discontinue the telemetry order, click here to go to the orders activity to modify/discontinue the order.              Principal Problem:    SDH (subdural hematoma) (Multi)        Timo Miranda MD  Physician  Internal Medicine     Consults      Signed     Date of Service: 4/10/2024 12:58 PM     Signed       Expand All Collapse All    Consults     Reason For Consult  Patient fully evaluated on April 10.     History Of Present Illness  Roman Lakhani is a 89 y.o. male presenting with with fall at his skilled nursing with subdural hematoma..     Past Medical History  He has no past medical history on file.     Surgical History  He has no past surgical history on file.     Social History  He has no history on file for tobacco use, alcohol use, and drug use.     Family History  Family History   No family history on file.        Allergies  Patient has no known allergies.     Review of Systems        Physical Exam           Last Recorded Vitals  /74   Pulse 79   Temp 36.3 °C (97.3 °F)   Resp 18   Wt 81.6 kg (180 lb)   SpO2 94%      Relevant Results           Assessment/Plan            Date of Service: 4/10/2024  7:36 AM            Attested Addendum        Expand All Collapse All    History Of Present Illness  Roman Lakhani is a 89 y.o. Kinyarw[place] speaking male with past medical history significant for dementia, CKD, DM, COPD w/ chronic resp failure (O2 dependent), paranoid schizophrenia, cardiomyopathy, anxiety/depression, HTN, HLD, BPH, and GERD who presented to [facility] ED 4/10 from [facility] s/p unwitnessed fall. Majority of history collected from chart and staff d/t dementia and language barrier.      In the ED, patient hemodynamically stable, afebrile. Labs remarkable for WBC 12.3, Na 13, glucose 179. Patient pan scanned with the following significant results: acute on chronic right frontoparietal SDH measuring up to 2.8cm as well as cerebral sulcal effacement, narrowing of the right lateral and 3rd ventricles and 5mm leftward midline shift. ED physician discussed patient with NSGY with recommendations for nonop management, ok for patient to remain at [facility]. Patient admitted to trauma service with consult to NSGY for continued management.      A: Airway intact  B: Breathing spontaneously, breath sounds are bilateral and equal  C: Pulses 2+throughout and equal.    D: Pupils equal and reactive, GCS 14 (E4, V4, M6). Moving all 4 extremities  E: Patient exposed and additional injuries noted; Warm blankets placed on patient     Secondary Survey:  NEURO: GCS 14, CN II-XII intact, VERNON equally, muscle strength 5/5  HEAD: NC/AT, No lacerations or abrasions, no bony step offs, midface stable.  EENT: PERRL, EOMI. Pupils 4-2mm b/l. external ear without laceration. Nasal septum midline, no crepitus or septal hematoma. Oral mucosa and tongue without lacerations, teeth in place.   NECK: No cervical spine tenderness or step offs, no lacerations or abrasions, trachea midline. No JVD.  RESPIRATORY/CHEST: No abrasions, contusions, crepitus or tenderness to palpation. Non-labored, equal chest expansion, CTAB, no W/R/R.  CV: RRR. Pulses  "bilateral: 2+ radial, 2+DP. No TTP of chest  ABDOMEN: soft, nontender, mild diffuse tenderness on palpation. No scars, abrasions or lacerations.  PELVIS: Stable to compression.  : nml external genitalia, no blood at urethral meatus  RECTAL: rectal tone deferred.  BACK/SPINE: No thoracic midline tenderness, step-offs or deformities. No lumbar midline tenderness, step-offs, or deformities.  No abrasions, hematomas or lacerations noted.  EXTREMITIES: Mild BLE edema. Nml ROM. Pain on palpation right thigh without obvious outward signs of trauma. No deformities, lacerations or contusions.        Past Medical History  Medical History   No past medical history on file.         Surgical History  Surgical History   No past surgical history on file.         Social History  He has no history on file for tobacco use, alcohol use, and drug use.     Family History  Family History   No family history on file.         Allergies  Patient has no known allergies.     Review of Systems     Physical Exam     Last Recorded Vitals  Blood pressure 133/71, pulse 84, temperature 36.6 °C (97.9 °F), resp. rate 18, height 1.676 m (5' 6\"), weight 81.6 kg (180 lb), SpO2 98%.     Relevant Results                     Results for orders placed or performed during the hospital encounter of 04/10/24 (from the past 24 hour(s))   CBC and Auto Differential   Result Value Ref Range     WBC 12.3 (H) 4.4 - 11.3 x10*3/uL     nRBC 0.0 0.0 - 0.0 /100 WBCs     RBC 3.97 (L) 4.50 - 5.90 x10*6/uL     Hemoglobin 11.8 (L) 13.5 - 17.5 g/dL     Hematocrit 36.4 (L) 41.0 - 52.0 %     MCV 92 80 - 100 fL     MCH 29.7 26.0 - 34.0 pg     MCHC 32.4 32.0 - 36.0 g/dL     RDW 14.0 11.5 - 14.5 %     Platelets 270 150 - 450 x10*3/uL     Neutrophils % 80.6 40.0 - 80.0 %     Immature Granulocytes %, Automated 0.2 0.0 - 0.9 %     Lymphocytes % 12.0 13.0 - 44.0 %     Monocytes % 5.9 2.0 - 10.0 %     Eosinophils % 0.9 0.0 - 6.0 %     Basophils % 0.4 0.0 - 2.0 %     Neutrophils " Absolute 9.88 (H) 1.60 - 5.50 x10*3/uL     Immature Granulocytes Absolute, Automated 0.03 0.00 - 0.50 x10*3/uL     Lymphocytes Absolute 1.47 0.80 - 3.00 x10*3/uL     Monocytes Absolute 0.73 0.05 - 0.80 x10*3/uL     Eosinophils Absolute 0.11 0.00 - 0.40 x10*3/uL     Basophils Absolute 0.05 0.00 - 0.10 x10*3/uL   Comprehensive metabolic panel   Result Value Ref Range     Glucose 179 (H) 74 - 99 mg/dL     Sodium 133 (L) 136 - 145 mmol/L     Potassium 4.7 3.5 - 5.3 mmol/L     Chloride 98 98 - 107 mmol/L     Bicarbonate 23 21 - 32 mmol/L     Anion Gap 17 10 - 20 mmol/L     Urea Nitrogen 16 6 - 23 mg/dL     Creatinine 1.17 0.50 - 1.30 mg/dL     eGFR 58 (L) >60 mL/min/1.73m*2     Calcium 9.5 8.6 - 10.3 mg/dL     Albumin 4.3 3.4 - 5.0 g/dL     Alkaline Phosphatase 86 33 - 136 U/L     Total Protein 7.0 6.4 - 8.2 g/dL     AST 23 9 - 39 U/L     Bilirubin, Total 1.5 (H) 0.0 - 1.2 mg/dL     ALT 12 10 - 52 U/L   aPTT   Result Value Ref Range     aPTT 30 27 - 38 seconds   Protime-INR   Result Value Ref Range     Protime 11.7 9.8 - 12.8 seconds     INR 1.0 0.9 - 1.1   Type and Screen   Result Value Ref Range     ABO TYPE O       Rh TYPE POS       ANTIBODY SCREEN NEG        XR femur right 2+ views     Result Date: 4/10/2024  STUDY: Femur Radiographs; 4/10/2024 6:12 AM INDICATION: Trauma. COMPARISON: None Available. ACCESSION NUMBER(S): [id number] ORDERING CLINICIAN: IRVING BECERRA TECHNIQUE:  Four view(s) of the right femur. FINDINGS:  Right-sided superior and inferior pubic rami and ischium normal. No fracture. The iliopectineal line on the right is normal. The femoral head is localized normally in the acetabular fossa. Femoral head appears to be normal. Femoral neck and intertrochanteric region appear to be normal. No fracture or trabecular line disruption. The femoral shaft appears to be normal. There are vessel calcifications identified. Small knee effusion suspected. Patellofemoral, medial and lateral compartment severe  degenerative changes are identified. No definite evidence of fracture involving the distal femoral shaft or the proximal tibial plateau or proximal fibula in their limited visualized extent.     No fracture or subluxation of the right femur. Severe tricompartmental degenerative changes of the right knee. Small knee effusion suspected. Signed by Godwin Riggs MD     XR pelvis 1-2 views     Result Date: 4/10/2024  STUDY: Pelvis Radiographs; 4/10/2024 6:12 AM INDICATION: Trauma. COMPARISON: None Available. ACCESSION NUMBER(S): [id number] ORDERING CLINICIAN: IRVING BECERRA TECHNIQUE:  One view(s) of the pelvis. FINDINGS:  Lower lumbar spine degenerative changes. Iliac wings normal with esthesiopathic changes. SI joint show degenerative changes. Bilateral hip DJD.  Superior and inferior pubic rami normal. Both hips appear to be localized normally in the acetabulum. Intertrochanteric region normal. Femoral head normal. Femoral neck appears normal. No distinct evidence of fracture identified trabecular lines appear to be normal. Proximal femoral shafts appear normal. Visualized portions of the pelvis remarkable for phleboliths.     Degenerative changes of the lower lumbar spine, hips  and SI joints. No evidence of fracture or subluxation. Signed by Godwin Riggs MD     XR chest 1 view     Result Date: 4/10/2024  STUDY: Chest Radiograph;  4/10/2024 6:12 AM INDICATION: Cough. COMPARISON: None Available. ACCESSION NUMBER(S): [id number] ORDERING CLINICIAN: IRVING BECERRA TECHNIQUE:  Frontal chest was obtained at 06:12 hours. FINDINGS: CARDIOMEDIASTINAL SILHOUETTE: Cardiomediastinal silhouette is normal in size and configuration. Calcified mediastinal lymph nodes. Airway normal.  LUNGS: Lungs are clear. Basilar atelectasis and scarring. No pneumothorax. No focal nodule  ABDOMEN: No remarkable upper abdominal findings. No free air.  BONES: No acute osseous changes. Degenerative changes of both shoulders and thoracic  spine. No focal rib abnormality is identified.     Normal size heart. Evidence of prior granulomatous disease. Basilar atelectatic changes. No focal infiltrate. Signed by Godwin Riggs MD     CT head wo IV contrast     Result Date: 4/10/2024  Interpreted By:  Arnulfo Moncada, STUDY: CT HEAD WO IV CONTRAST; CT CERVICAL SPINE WO IV CONTRAST; ; 4/10/2024 6:00 am   INDICATION: Signs/Symptoms:trauma.   COMPARISON: None.   ACCESSION NUMBER(S): [id number]; [id number]   ORDERING CLINICIAN: IRVING BECERRA   TECHNIQUE: Noncontrast CT exams of the head and cervical spine with multiplanar reformations.   FINDINGS: BRAIN PARENCHYMA: Gray-white matter interfaces are preserved. 5 mm of leftward midline shift at the level of foramen of Monro.   HEMORRHAGE: There is an acute on chronic right frontoparietal subdural hematoma measuring up to 2.8 cm in maximal thickness along the anterolateral frontal convexity. VENTRICLES and EXTRA-AXIAL SPACES: There is cerebral sulcal effacement. Asymmetric narrowing of the right lateral ventricle and narrowing of the 3rd ventricle. No definite hydrocephalus. EXTRACRANIAL SOFT TISSUES: Small right posterior scalp hematoma. PARANASAL SINUSES/MASTOIDS: The visualized paranasal sinuses and mastoid air cells are aerated. CALVARIUM: No depressed skull fracture. No destructive osseous lesion.   OTHER FINDINGS: None.   CERVICAL SPINE:   Mild reversal of the normal cervical lordosis could reflect positioning or muscle spasm. ALIGNMENT: Grade 1 degenerative anterolisthesis at C2-C3 and C3-C4. Trace degenerative anterolisthesis at C7-T1. Alignment is otherwise maintained. VERTEBRAE: No acute fracture is seen. Vertebral stature is maintained. Degenerative endplate osteophytosis and uncovertebral hypertrophy in conjunction with moderate to severe degenerative disc height loss, overall worst at C7-T1. Moderate to severe multilevel hypertrophic facet osteoarthropathy. SPINAL CANAL: No critical spinal canal  stenosis. PREVERTEBRAL SOFT TISSUES: No prevertebral soft tissue swelling. LUNG APICES: Imaged portion of the lung apices are within normal limits.   OTHER FINDINGS: None.        There is an acute on chronic right frontoparietal subdural hematoma measuring up to 2.8 cm in maximal thickness along the anterolateral frontal convexity. There is cerebral sulcal effacement, narrowing of the right lateral and 3rd ventricles and 5 mm of leftward midline shift. Neurosurgical evaluation recommended.   No acute cervical spine fracture or malalignment. Severe spondylosis.     MACRO: Arnulfo Moncada discussed the significance and urgency of this critical finding by epic secure chat with  IRVING BECERRA on 4/10/2024 at 6:25 am.  (**-RCF-**) Findings:  See findings.     Signed by: Arnulfo Moncada 4/10/2024 6:25 AM Dictation workstation:   [id number]     CT cervical spine wo IV contrast     Result Date: 4/10/2024  Interpreted By:  Arnulfo Moncada, STUDY: CT HEAD WO IV CONTRAST; CT CERVICAL SPINE WO IV CONTRAST; ; 4/10/2024 6:00 am   INDICATION: Signs/Symptoms:trauma.   COMPARISON: None.   ACCESSION NUMBER(S): [id number]; [id number]   ORDERING CLINICIAN: IRVING BECERRA   TECHNIQUE: Noncontrast CT exams of the head and cervical spine with multiplanar reformations.   FINDINGS: BRAIN PARENCHYMA: Gray-white matter interfaces are preserved. 5 mm of leftward midline shift at the level of foramen of Monro.   HEMORRHAGE: There is an acute on chronic right frontoparietal subdural hematoma measuring up to 2.8 cm in maximal thickness along the anterolateral frontal convexity. VENTRICLES and EXTRA-AXIAL SPACES: There is cerebral sulcal effacement. Asymmetric narrowing of the right lateral ventricle and narrowing of the 3rd ventricle. No definite hydrocephalus. EXTRACRANIAL SOFT TISSUES: Small right posterior scalp hematoma. PARANASAL SINUSES/MASTOIDS: The visualized paranasal sinuses and mastoid air cells are aerated. CALVARIUM: No depressed skull  fracture. No destructive osseous lesion.   OTHER FINDINGS: None.   CERVICAL SPINE:   Mild reversal of the normal cervical lordosis could reflect positioning or muscle spasm. ALIGNMENT: Grade 1 degenerative anterolisthesis at C2-C3 and C3-C4. Trace degenerative anterolisthesis at C7-T1. Alignment is otherwise maintained. VERTEBRAE: No acute fracture is seen. Vertebral stature is maintained. Degenerative endplate osteophytosis and uncovertebral hypertrophy in conjunction with moderate to severe degenerative disc height loss, overall worst at C7-T1. Moderate to severe multilevel hypertrophic facet osteoarthropathy. SPINAL CANAL: No critical spinal canal stenosis. PREVERTEBRAL SOFT TISSUES: No prevertebral soft tissue swelling. LUNG APICES: Imaged portion of the lung apices are within normal limits.   OTHER FINDINGS: None.        There is an acute on chronic right frontoparietal subdural hematoma measuring up to 2.8 cm in maximal thickness along the anterolateral frontal convexity. There is cerebral sulcal effacement, narrowing of the right lateral and 3rd ventricles and 5 mm of leftward midline shift. Neurosurgical evaluation recommended.   No acute cervical spine fracture or malalignment. Severe spondylosis.     MACRO: Arnulfo Moncada discussed the significance and urgency of this critical finding by epic secure chat with  IRVING BECERRA on 4/10/2024 at 6:25 am.  (**-RCF-**) Findings:  See findings.     Signed by: Arnulfo Moncada 4/10/2024 6:25 AM Dictation workstation:   [id number]            Assessment/Plan   Principal Problem:    SDH (subdural hematoma) (CMS/HCC)     Roman Lakhani is a 89 y.o. [place] speaking male with past medical history significant for dementia, CKD, DM, COPD w/ chronic resp failure (O2 dependent), paranoid schizophrenia, cardiomyopathy, anxiety/depression, HTN, HLD, BPH, and GERD who presented to [facility] ED 4/10 from [facility] s/p unwitnessed fall. Patient pan scanned with the  following significant results: acute on chronic right frontoparietal SDH measuring up to 2.8cm as well as cerebral sulcal effacement, narrowing of the right lateral and 3rd ventricles and 5mm leftward midline shift. Patient admitted to trauma service with consult to NSGY for continued management.      List of clinically significant injuries/problems:  Acute on chronic 2.8 cm SDH with 5 mm leftward midline shift  Small right knee effusion with severe tricompartmental degenerative changes  Leukocytosis  Hyperglycemia  Bed bugs     Assessment/Plan:     # SDH  - NSGY consulted, appreciate input  - pending repeat head CT at 1230     -> NPO pending stability of repeat CT  - maintain HOB >30 degrees  - maintain SBP <160  - Q4H neurochecks  - avoid narcotics if possible given hx dementia with TBI d/t concern for development of delirium  - acetaminophen prn for pain  - PT/OT     # Leukocytosis  - likely reactive  - if uptrending will look for additional causes  - am labs     # Dementia  # DM  # CKD  # COPD  # Chronic resp failure (O2 dependent)  # HTN  # HLD  - medicine consulted  - ISS while inpatient  - restart home meds when med rec complete and medically appropriate      # Bed bugs  - contact precautions     # DVT ppx  - hold until cleared by NSGY  - SCD's     Plan not finalized until discussed with Dr. Pickens                 I spent 60 minutes in the professional and overall care of this patient.                                           Cosigned by:         Revision History    Patient fully evaluated on April 12.  Patient more awake and alert this afternoon was minimally responsive earlier.  Patient has a rash over his forearms consistent with eczema.  Questionable bedbug was found in the emergency room and awaiting pathologic evaluation.  I had fully examined the patient and found no further bedbugs at this time.  Recommend triamcinolone cream twice daily to areas of eczema.  Recheck labs in AM.  Further neurochecks  overnight.                                        Patient Fully evaluated 4/15/2024. Patient signed off by neurosurgery and ready for Discharge. Continue to monitor neurologic examination.  Recheck labs in AM.     Patient fully evaluated 4/16. He is ready to discontinue fluids now that he is consistently eating and drinking with the aid of family members. Ready for DC.          Physical Exam  Vitals and nursing note reviewed.   Constitutional:       General: He is not in acute distress.     Appearance: He is well-developed.   HENT:      Head: Normocephalic and atraumatic.   Eyes:      Conjunctiva/sclera: Conjunctivae normal.   Cardiovascular:      Rate and Rhythm: Normal rate and regular rhythm.      Heart sounds: No murmur heard.  Pulmonary:      Effort: Pulmonary effort is normal. No respiratory distress.      Breath sounds: Normal breath sounds.   Abdominal:      Palpations: Abdomen is soft.      Tenderness: There is no abdominal tenderness.   Musculoskeletal:         General: No swelling.      Cervical back: Neck supple. No tenderness.      Comments: Tenderness palpation proximal femur.  Appears to have mild pain with movement of the right hip.  No right knee or ankle pain.  He examination including palpation and range of motion of 3 other extremities does not elicit any pain.   Skin:     General: Skin is warm and dry.      Capillary Refill: Capillary refill takes less than 2 seconds.   Neurological:      Mental Status: He is alert.   Psychiatric:         Mood and Affect: Mood normal.     JOSIE BARAKAT

## 2024-04-16 NOTE — CONSULTS
"Nutrition Follow Up Assessment:   Nutrition Assessment         Patient is a 89 y.o. male presenting with subdural hematoma       Nutrition History:  Food and Nutrient History: Pt was started on a pureed diet yesterday per SLP.  He ate all of his breakfast and lunch today.  Food Allergies/Intolerances:  None  GI Symptoms: None  Oral Problems: Swallowing difficulty       Anthropometrics:  Height: 167.6 cm (5' 5.98\")   Weight: 81.6 kg (179 lb 14.3 oz)   BMI (Calculated): 29.05  IBW/kg (Dietitian Calculated): 64 kg  Percent of IBW: 122 %       Weight History:     Weight Change %:       Nutrition Focused Physical Exam Findings:    Subcutaneous Fat Loss:      Muscle Wasting:     Edema:     Physical Findings:       Nutrition Significant Labs:  BMP Trend:   Results from last 7 days   Lab Units 04/15/24  0626 04/14/24  0608 04/13/24  0544 04/12/24  0604   GLUCOSE mg/dL 87 129* 101* 104*   CALCIUM mg/dL 8.6 8.6 8.8 8.9   SODIUM mmol/L 134* 134* 135* 135*   POTASSIUM mmol/L 4.1 4.3 3.9 4.0   CO2 mmol/L 30 28 27 27   CHLORIDE mmol/L 99 99 100 101   BUN mg/dL 13 16 20 15   CREATININE mg/dL 0.96 1.09 1.25 1.08        Nutrition Specific Medications:  Aricept; pepcid; lantus; hum r    I/O:   Last BM Date: 04/11/24;      Dietary Orders (From admission, onward)       Start     Ordered    04/13/24 0952  Adult diet Carb Controlled; 60 gram carb/meal, 30 gram Carb evening snack; Pureed 4; Thin 0; 1:1 Feeding, No straw  Diet effective now        Comments: Pills crushed in puree carrier   Question Answer Comment   Diet type Carb Controlled    Carb diet selection: 60 gram carb/meal, 30 gram Carb evening snack    Texture Pureed 4    Fluid consistency Thin 0    Select tray type: 1:1 Feeding    Select tray type: No straw        04/13/24 0952                     Estimated Needs:      Method for Estimating Needs: 0789-4327  26-30 gm kg of IBW     Method for Estimating Needs: 64-77   1-1.2 gm kg of IBW     Method for Estimating Needs: 7882-2603  "  20-30 ml kg of IBW        Nutrition Diagnosis        Nutrition Diagnosis  Patient has Nutrition Diagnosis: Yes  Diagnosis Status (1): Ongoing  Nutrition Diagnosis 1: Swallowing difficulity  Related to (1): motor causes  As Evidenced by (1): pt was started on a pureed diet       Nutrition Interventions/Recommendations         Nutrition Prescription:  Individualized Nutrition Prescription Provided for : Continue diet texture per pt need,  continue 60 gm carb consistent as long as pt eats well,  sending glucerna at breakfast and magic cup at lunch to help meet nutritional needs          Nutrition Education:          Nutrition Monitoring and Evaluation   Food/Nutrient Related History Monitoring  Monitoring and Evaluation Plan: Energy intake, Fluid intake, Amount of food         Biochemical Data, Medical Tests and Procedures  Monitoring and Evaluation Plan: Electrolyte/renal panel, Glucose/endocrine profile            Time Spent/Follow-up Reminder:   Time Spent (min): 30 minutes  Last Date of Nutrition Visit: 04/15/24  Nutrition Follow-Up Needed?: 3-5 days  Follow up Comment: 4/18  MZ

## 2024-04-16 NOTE — CARE PLAN
The patient's goals for the shift include     Problem: Pain  Goal: My pain/discomfort is manageable  Outcome: Progressing     Problem: Safety  Goal: Patient will be injury free during hospitalization  Outcome: Progressing     Problem: Daily Care  Goal: Daily care needs are met  Outcome: Progressing     Problem: Psychosocial Needs  Goal: Demonstrates ability to cope with hospitalization/illness  Outcome: Progressing     Problem: Skin  Goal: Prevent/manage excess moisture  Outcome: Progressing  Flowsheets (Taken 4/16/2024 0146)  Prevent/manage excess moisture:   Cleanse incontinence/protect with barrier cream   Moisturize dry skin

## 2024-04-16 NOTE — PROGRESS NOTES
FOC is [facility]. Awaiting response from [facility] if patient is going SNF to LTC.

## 2024-04-17 ENCOUNTER — APPOINTMENT (OUTPATIENT)
Dept: RADIOLOGY | Facility: HOSPITAL | Age: 89
DRG: 082 | End: 2024-04-17
Payer: COMMERCIAL

## 2024-04-17 LAB
ALBUMIN SERPL BCP-MCNC: 3.4 G/DL (ref 3.4–5)
ALP SERPL-CCNC: 74 U/L (ref 33–136)
ALT SERPL W P-5'-P-CCNC: 10 U/L (ref 10–52)
ANION GAP SERPL CALC-SCNC: 12 MMOL/L (ref 10–20)
AST SERPL W P-5'-P-CCNC: 13 U/L (ref 9–39)
BILIRUB SERPL-MCNC: 1.1 MG/DL (ref 0–1.2)
BUN SERPL-MCNC: 12 MG/DL (ref 6–23)
CALCIUM SERPL-MCNC: 8.6 MG/DL (ref 8.6–10.3)
CHLORIDE SERPL-SCNC: 98 MMOL/L (ref 98–107)
CO2 SERPL-SCNC: 29 MMOL/L (ref 21–32)
CREAT SERPL-MCNC: 0.95 MG/DL (ref 0.5–1.3)
EGFRCR SERPLBLD CKD-EPI 2021: 75 ML/MIN/1.73M*2
ERYTHROCYTE [DISTWIDTH] IN BLOOD BY AUTOMATED COUNT: 13.7 % (ref 11.5–14.5)
GLUCOSE BLD MANUAL STRIP-MCNC: 127 MG/DL (ref 74–99)
GLUCOSE BLD MANUAL STRIP-MCNC: 139 MG/DL (ref 74–99)
GLUCOSE BLD MANUAL STRIP-MCNC: 238 MG/DL (ref 74–99)
GLUCOSE SERPL-MCNC: 120 MG/DL (ref 74–99)
HCT VFR BLD AUTO: 33.2 % (ref 41–52)
HGB BLD-MCNC: 11 G/DL (ref 13.5–17.5)
MCH RBC QN AUTO: 29.6 PG (ref 26–34)
MCHC RBC AUTO-ENTMCNC: 33.1 G/DL (ref 32–36)
MCV RBC AUTO: 89 FL (ref 80–100)
NRBC BLD-RTO: 0 /100 WBCS (ref 0–0)
PLATELET # BLD AUTO: 263 X10*3/UL (ref 150–450)
POTASSIUM SERPL-SCNC: 4.5 MMOL/L (ref 3.5–5.3)
PROT SERPL-MCNC: 5.5 G/DL (ref 6.4–8.2)
RBC # BLD AUTO: 3.72 X10*6/UL (ref 4.5–5.9)
SODIUM SERPL-SCNC: 134 MMOL/L (ref 136–145)
WBC # BLD AUTO: 8.7 X10*3/UL (ref 4.4–11.3)

## 2024-04-17 PROCEDURE — 36415 COLL VENOUS BLD VENIPUNCTURE: CPT | Performed by: INTERNAL MEDICINE

## 2024-04-17 PROCEDURE — 82947 ASSAY GLUCOSE BLOOD QUANT: CPT

## 2024-04-17 PROCEDURE — 2500000001 HC RX 250 WO HCPCS SELF ADMINISTERED DRUGS (ALT 637 FOR MEDICARE OP): Performed by: NURSE PRACTITIONER

## 2024-04-17 PROCEDURE — 70450 CT HEAD/BRAIN W/O DYE: CPT

## 2024-04-17 PROCEDURE — 2500000006 HC RX 250 W HCPCS SELF ADMINISTERED DRUGS (ALT 637 FOR ALL PAYERS): Performed by: NURSE PRACTITIONER

## 2024-04-17 PROCEDURE — 2500000004 HC RX 250 GENERAL PHARMACY W/ HCPCS (ALT 636 FOR OP/ED): Performed by: INTERNAL MEDICINE

## 2024-04-17 PROCEDURE — 2500000004 HC RX 250 GENERAL PHARMACY W/ HCPCS (ALT 636 FOR OP/ED): Performed by: NURSE PRACTITIONER

## 2024-04-17 PROCEDURE — 70450 CT HEAD/BRAIN W/O DYE: CPT | Performed by: RADIOLOGY

## 2024-04-17 PROCEDURE — 85027 COMPLETE CBC AUTOMATED: CPT | Performed by: INTERNAL MEDICINE

## 2024-04-17 PROCEDURE — 80053 COMPREHEN METABOLIC PANEL: CPT | Performed by: INTERNAL MEDICINE

## 2024-04-17 PROCEDURE — 2060000001 HC INTERMEDIATE ICU ROOM DAILY

## 2024-04-17 PROCEDURE — 2500000006 HC RX 250 W HCPCS SELF ADMINISTERED DRUGS (ALT 637 FOR ALL PAYERS): Mod: MUE | Performed by: NURSE PRACTITIONER

## 2024-04-17 RX ORDER — DEXTROSE MONOHYDRATE AND SODIUM CHLORIDE 5; .45 G/100ML; G/100ML
60 INJECTION, SOLUTION INTRAVENOUS CONTINUOUS
Status: DISCONTINUED | OUTPATIENT
Start: 2024-04-17 | End: 2024-04-20 | Stop reason: HOSPADM

## 2024-04-17 RX ADMIN — HYDRALAZINE HYDROCHLORIDE 5 MG: 20 INJECTION INTRAMUSCULAR; INTRAVENOUS at 22:40

## 2024-04-17 RX ADMIN — PANTOPRAZOLE SODIUM 20 MG: 20 TABLET, DELAYED RELEASE ORAL at 06:06

## 2024-04-17 RX ADMIN — TRIAMCINOLONE ACETONIDE: 1 OINTMENT TOPICAL at 20:24

## 2024-04-17 RX ADMIN — DEXTROSE AND SODIUM CHLORIDE 60 ML/HR: 5; 450 INJECTION, SOLUTION INTRAVENOUS at 16:01

## 2024-04-17 RX ADMIN — DONEPEZIL HYDROCHLORIDE 10 MG: 10 TABLET ORAL at 20:23

## 2024-04-17 RX ADMIN — LEVOTHYROXINE SODIUM 150 MCG: 150 TABLET ORAL at 06:06

## 2024-04-17 ASSESSMENT — COGNITIVE AND FUNCTIONAL STATUS - GENERAL
DAILY ACTIVITIY SCORE: 9
PERSONAL GROOMING: A LOT
MOVING TO AND FROM BED TO CHAIR: TOTAL
STANDING UP FROM CHAIR USING ARMS: TOTAL
TOILETING: TOTAL
DRESSING REGULAR UPPER BODY CLOTHING: A LOT
HELP NEEDED FOR BATHING: TOTAL
CLIMB 3 TO 5 STEPS WITH RAILING: TOTAL
EATING MEALS: A LOT
MOBILITY SCORE: 6
WALKING IN HOSPITAL ROOM: TOTAL
TURNING FROM BACK TO SIDE WHILE IN FLAT BAD: TOTAL
DRESSING REGULAR LOWER BODY CLOTHING: TOTAL
MOVING FROM LYING ON BACK TO SITTING ON SIDE OF FLAT BED WITH BEDRAILS: TOTAL

## 2024-04-17 ASSESSMENT — PAIN SCALES - GENERAL: PAINLEVEL_OUTOF10: 0 - NO PAIN

## 2024-04-17 ASSESSMENT — PAIN - FUNCTIONAL ASSESSMENT: PAIN_FUNCTIONAL_ASSESSMENT: FLACC (FACE, LEGS, ACTIVITY, CRY, CONSOLABILITY)

## 2024-04-17 NOTE — PROGRESS NOTES
Roman Lakhani is a 89 y.o. male on day 7 of admission presenting with SDH (subdural hematoma) (Multi).      Subjective   Patient fully evaluated on April 17, 2024.        Objective     Last Recorded Vitals  /79 (BP Location: Left arm, Patient Position: Lying)   Pulse 85   Temp 36.2 °C (97.2 °F) (Temporal)   Resp 20   Wt 81.6 kg (179 lb 14.3 oz)   SpO2 93%   Intake/Output last 3 Shifts:    Intake/Output Summary (Last 24 hours) at 4/17/2024 1738  Last data filed at 4/16/2024 2018  Gross per 24 hour   Intake --   Output 900 ml   Net -900 ml       Admission Weight  Weight: 81.6 kg (180 lb) (04/10/24 0452)    Daily Weight  04/15/24 : 81.6 kg (179 lb 14.3 oz)    Image Results  CT head wo IV contrast  Narrative: Interpreted By:  Roberto Ruelas,   STUDY:  CT HEAD WO IV CONTRAST;  4/17/2024 11:41 am      INDICATION:  Signs/Symptoms:lethargy, recent subural hematoma.      COMPARISON:  04/11/2024      ACCESSION NUMBER(S):  [id number]      ORDERING CLINICIAN:  IRVING ADAM      TECHNIQUE:  Noncontrast axial CT scan of head was performed. Angled reformats in  brain and bone windows were generated. The images were reviewed in  bone, brain, blood and soft tissue windows.          FINDINGS:  Right-sided subdural hematoma again seen. It measures about 3.1 cm in  greatest thickness with slight increase in midline shift to the left  now measuring 1.1 cm. Increasing mass effect on the right lateral  ventricle with obliteration. High attenuation seen layering  dependently compatible with a acute on chronic hemorrhage in the  right subdural space..      Global volume loss detected in the brain. Pattern can be seen the  setting of dementia with prominence of the temporal horns. No  intraventricular hemorrhage. No intraparenchymal hemorrhage. Evidence  of acute skull fracture. Paranasal sinuses and mastoids otherwise  clear.              Impression: Worsening mass effect with midline shift to the left with a acute  on  chronic subdural hematoma now slightly increased in size. Persistent  subfalcine herniation.      Findings global volume loss with features associated with dementia      MACRO:  Critical Finding:  See findings. Notification was initiated on  4/17/2024 at 12:47 pm by  Roberto Ruleas.  (**-OCF-**) Instructions:      Signed by: Roberto Ruelas 4/17/2024 12:47 PM  Dictation workstation:   [id number]      Physical Exam  Physical Exam  Vitals and nursing note reviewed.   Constitutional:       General: He is not in acute distress. More lethargic compared to yesterday.      Appearance: He is well-developed.   HENT:      Head: Normocephalic and atraumatic.   Eyes:      Conjunctiva/sclera: Conjunctivae normal.   Cardiovascular:      Rate and Rhythm: Normal rate and regular rhythm.      Heart sounds: No murmur heard.  Pulmonary:      Effort: Pulmonary effort is normal. No respiratory distress.      Breath sounds: Normal breath sounds.   Abdominal:      Palpations: Abdomen is soft.      Tenderness: There is no abdominal tenderness.   Musculoskeletal:         General: No swelling.      Cervical back: Neck supple. No tenderness.      Comments: Tenderness palpation proximal femur.  Appears to have mild pain with movement of the right hip.  No right knee or ankle pain.  He examination including palpation and range of motion of 3 other extremities does not elicit any pain.   Skin:     General: Skin is warm and dry.      Capillary Refill: Capillary refill takes less than 2 seconds.   Neurological:      Mental Status: He is more lethargic.    Psychiatric:         Mood and Affect: Mood normal.     Relevant Results  Scheduled medications  atorvastatin, 40 mg, oral, Daily  donepezil, 10 mg, oral, BID  famotidine, 20 mg, oral, Daily  insulin glargine, 17 Units, subcutaneous, q24h  insulin regular, 0-10 Units, subcutaneous, TID with meals  levothyroxine, 150 mcg, oral, Daily before breakfast  lisinopril, 5 mg, oral,  Daily  pantoprazole, 20 mg, oral, Daily before breakfast  polyethylene glycol, 17 g, oral, Daily  spironolactone, 25 mg, oral, Daily  tamsulosin, 0.4 mg, oral, Daily  triamcinolone, , Topical, BID      Continuous medications  dextrose 5%-0.45 % sodium chloride, 60 mL/hr, Last Rate: 60 mL/hr (04/17/24 1601)      PRN medications  PRN medications: acetaminophen, dextrose, dextrose, glucagon, glucagon, hydrALAZINE, ipratropium-albuteroL, ondansetron    Results for orders placed or performed during the hospital encounter of 04/10/24 (from the past 24 hour(s))   POCT GLUCOSE   Result Value Ref Range    POCT Glucose 232 (H) 74 - 99 mg/dL   CBC   Result Value Ref Range    WBC 8.7 4.4 - 11.3 x10*3/uL    nRBC 0.0 0.0 - 0.0 /100 WBCs    RBC 3.72 (L) 4.50 - 5.90 x10*6/uL    Hemoglobin 11.0 (L) 13.5 - 17.5 g/dL    Hematocrit 33.2 (L) 41.0 - 52.0 %    MCV 89 80 - 100 fL    MCH 29.6 26.0 - 34.0 pg    MCHC 33.1 32.0 - 36.0 g/dL    RDW 13.7 11.5 - 14.5 %    Platelets 263 150 - 450 x10*3/uL   Comprehensive Metabolic Panel   Result Value Ref Range    Glucose 120 (H) 74 - 99 mg/dL    Sodium 134 (L) 136 - 145 mmol/L    Potassium 4.5 3.5 - 5.3 mmol/L    Chloride 98 98 - 107 mmol/L    Bicarbonate 29 21 - 32 mmol/L    Anion Gap 12 10 - 20 mmol/L    Urea Nitrogen 12 6 - 23 mg/dL    Creatinine 0.95 0.50 - 1.30 mg/dL    eGFR 75 >60 mL/min/1.73m*2    Calcium 8.6 8.6 - 10.3 mg/dL    Albumin 3.4 3.4 - 5.0 g/dL    Alkaline Phosphatase 74 33 - 136 U/L    Total Protein 5.5 (L) 6.4 - 8.2 g/dL    AST 13 9 - 39 U/L    Bilirubin, Total 1.1 0.0 - 1.2 mg/dL    ALT 10 10 - 52 U/L   POCT GLUCOSE   Result Value Ref Range    POCT Glucose 127 (H) 74 - 99 mg/dL   POCT GLUCOSE   Result Value Ref Range    POCT Glucose 139 (H) 74 - 99 mg/dL       Assessment/Plan   This patient currently has cardiac telemetry ordered; if you would like to modify or discontinue the telemetry order, click here to go to the orders activity to modify/discontinue the  order.    Principal Problem:    SDH (subdural hematoma) (Multi)    # SDH  # Leukocytosis  # Dementia  # DM  # CKD  # COPD  # Chronic resp failure (O2 dependent)  # HTN  # HLD  # Bed bugs    Patient fully evaluated on April 10. Patient more awake and alert this afternoon was minimally responsive earlier. Patient has a rash over his forearms consistent with eczema. Questionable bedbug was found in the emergency room and awaiting pathologic evaluation. I had fully examined the patient and found no further bedbugs at this time. Recommend triamcinolone cream twice daily to areas of eczema. Recheck labs in AM. Further neurochecks overnight.     Patient fully evaluated on April 11.  Clinically unchanged.  Subdural hematoma results noted and no surgical intervention planned for now.  Continue supportive care.     Patient Fully evaluated 4/12/2024. Patient was given IV fluids. Patient signed off by neurosurgery and ready for Discharge.  Continue to monitor neurologic examination.  Recheck labs in AM.    Patient fully evaluated on April 13, 2024. Family at bedside. Reviewed labs this morning . Will start IV fluids. Will continue to monitor inpatient. Repeat labs in the am.    Patient fully evaluated on April 14.  More animated today.  Lab work stable.  Speech therapy to reevaluate tomorrow.  Recheck labs in AM.    Patient Fully evaluated 4/15/2024. Patient signed off by neurosurgery and ready for Discharge. Continue to monitor neurologic examination.  Recheck labs in AM.                Patient fully evaluated 4/16. He is ready to discontinue fluids now that he is consistently eating and drinking with the aid of family members. Ready for DC.    Patient fully evaluated on April 17, 2024. Patient with worsening mass effect with midline shift and increased SDH compared to CT on April 11. Patient more lethargic and less responsive today. Neurology consulted. Family at bedside, discussed probable consult to palliative pending  neurology recommendations. D5 fluids started due to decrease in oral intake. Patient to remain on floor. Recheck labs in AM.            SARKIS VILLALOBOS

## 2024-04-17 NOTE — PROGRESS NOTES
Requested precert be started by precert team. Received message from them stating they are unable to start precert and the facility will have to start. Request sent to facility via careport for them to start auth.

## 2024-04-18 LAB
ALBUMIN SERPL BCP-MCNC: 3.5 G/DL (ref 3.4–5)
ALP SERPL-CCNC: 76 U/L (ref 33–136)
ALT SERPL W P-5'-P-CCNC: 9 U/L (ref 10–52)
ANION GAP SERPL CALC-SCNC: 13 MMOL/L (ref 10–20)
AST SERPL W P-5'-P-CCNC: 11 U/L (ref 9–39)
BILIRUB SERPL-MCNC: 1.1 MG/DL (ref 0–1.2)
BUN SERPL-MCNC: 15 MG/DL (ref 6–23)
CALCIUM SERPL-MCNC: 8.9 MG/DL (ref 8.6–10.3)
CHLORIDE SERPL-SCNC: 97 MMOL/L (ref 98–107)
CO2 SERPL-SCNC: 26 MMOL/L (ref 21–32)
CREAT SERPL-MCNC: 0.96 MG/DL (ref 0.5–1.3)
EGFRCR SERPLBLD CKD-EPI 2021: 74 ML/MIN/1.73M*2
ERYTHROCYTE [DISTWIDTH] IN BLOOD BY AUTOMATED COUNT: 13.6 % (ref 11.5–14.5)
GLUCOSE BLD MANUAL STRIP-MCNC: 210 MG/DL (ref 74–99)
GLUCOSE BLD MANUAL STRIP-MCNC: 233 MG/DL (ref 74–99)
GLUCOSE BLD MANUAL STRIP-MCNC: 311 MG/DL (ref 74–99)
GLUCOSE SERPL-MCNC: 223 MG/DL (ref 74–99)
HCT VFR BLD AUTO: 36.1 % (ref 41–52)
HGB BLD-MCNC: 12 G/DL (ref 13.5–17.5)
MCH RBC QN AUTO: 29.6 PG (ref 26–34)
MCHC RBC AUTO-ENTMCNC: 33.2 G/DL (ref 32–36)
MCV RBC AUTO: 89 FL (ref 80–100)
NRBC BLD-RTO: 0 /100 WBCS (ref 0–0)
PLATELET # BLD AUTO: 260 X10*3/UL (ref 150–450)
POTASSIUM SERPL-SCNC: 4.3 MMOL/L (ref 3.5–5.3)
PROT SERPL-MCNC: 5.8 G/DL (ref 6.4–8.2)
RBC # BLD AUTO: 4.06 X10*6/UL (ref 4.5–5.9)
SODIUM SERPL-SCNC: 132 MMOL/L (ref 136–145)
WBC # BLD AUTO: 8.6 X10*3/UL (ref 4.4–11.3)

## 2024-04-18 PROCEDURE — 2500000006 HC RX 250 W HCPCS SELF ADMINISTERED DRUGS (ALT 637 FOR ALL PAYERS): Performed by: NURSE PRACTITIONER

## 2024-04-18 PROCEDURE — 99232 SBSQ HOSP IP/OBS MODERATE 35: CPT | Performed by: NURSE PRACTITIONER

## 2024-04-18 PROCEDURE — 36415 COLL VENOUS BLD VENIPUNCTURE: CPT | Performed by: INTERNAL MEDICINE

## 2024-04-18 PROCEDURE — 2500000002 HC RX 250 W HCPCS SELF ADMINISTERED DRUGS (ALT 637 FOR MEDICARE OP, ALT 636 FOR OP/ED): Performed by: NURSE PRACTITIONER

## 2024-04-18 PROCEDURE — 85027 COMPLETE CBC AUTOMATED: CPT | Performed by: INTERNAL MEDICINE

## 2024-04-18 PROCEDURE — 2500000006 HC RX 250 W HCPCS SELF ADMINISTERED DRUGS (ALT 637 FOR ALL PAYERS): Mod: MUE | Performed by: NURSE PRACTITIONER

## 2024-04-18 PROCEDURE — 82947 ASSAY GLUCOSE BLOOD QUANT: CPT

## 2024-04-18 PROCEDURE — 2500000001 HC RX 250 WO HCPCS SELF ADMINISTERED DRUGS (ALT 637 FOR MEDICARE OP): Performed by: NURSE PRACTITIONER

## 2024-04-18 PROCEDURE — 2060000001 HC INTERMEDIATE ICU ROOM DAILY

## 2024-04-18 PROCEDURE — 84075 ASSAY ALKALINE PHOSPHATASE: CPT | Performed by: INTERNAL MEDICINE

## 2024-04-18 RX ADMIN — INSULIN HUMAN 8 UNITS: 100 INJECTION, SOLUTION PARENTERAL at 13:39

## 2024-04-18 RX ADMIN — FAMOTIDINE 20 MG: 20 TABLET ORAL at 09:51

## 2024-04-18 RX ADMIN — LEVOTHYROXINE SODIUM 150 MCG: 150 TABLET ORAL at 06:03

## 2024-04-18 RX ADMIN — DONEPEZIL HYDROCHLORIDE 10 MG: 10 TABLET ORAL at 09:51

## 2024-04-18 RX ADMIN — ACETAMINOPHEN 650 MG: 325 TABLET ORAL at 09:51

## 2024-04-18 RX ADMIN — SPIRONOLACTONE 25 MG: 25 TABLET, FILM COATED ORAL at 09:51

## 2024-04-18 RX ADMIN — INSULIN GLARGINE 17 UNITS: 100 INJECTION, SOLUTION SUBCUTANEOUS at 18:22

## 2024-04-18 RX ADMIN — ATORVASTATIN CALCIUM 40 MG: 40 TABLET, FILM COATED ORAL at 09:51

## 2024-04-18 RX ADMIN — INSULIN HUMAN 4 UNITS: 100 INJECTION, SOLUTION PARENTERAL at 09:51

## 2024-04-18 RX ADMIN — INSULIN HUMAN 4 UNITS: 100 INJECTION, SOLUTION PARENTERAL at 18:24

## 2024-04-18 RX ADMIN — TRIAMCINOLONE ACETONIDE: 1 OINTMENT TOPICAL at 09:55

## 2024-04-18 RX ADMIN — PANTOPRAZOLE SODIUM 20 MG: 20 TABLET, DELAYED RELEASE ORAL at 06:04

## 2024-04-18 RX ADMIN — LISINOPRIL 5 MG: 5 TABLET ORAL at 09:51

## 2024-04-18 RX ADMIN — TAMSULOSIN HYDROCHLORIDE 0.4 MG: 0.4 CAPSULE ORAL at 09:50

## 2024-04-18 ASSESSMENT — COGNITIVE AND FUNCTIONAL STATUS - GENERAL
DRESSING REGULAR UPPER BODY CLOTHING: A LOT
DRESSING REGULAR LOWER BODY CLOTHING: TOTAL
HELP NEEDED FOR BATHING: TOTAL
DAILY ACTIVITIY SCORE: 8
PERSONAL GROOMING: A LOT
EATING MEALS: TOTAL
TOILETING: TOTAL

## 2024-04-18 ASSESSMENT — PAIN SCALES - WONG BAKER: WONGBAKER_NUMERICALRESPONSE: NO HURT

## 2024-04-18 ASSESSMENT — PAIN SCALES - GENERAL
PAINLEVEL_OUTOF10: 0 - NO PAIN
PAINLEVEL_OUTOF10: 3

## 2024-04-18 NOTE — NURSING NOTE
With rounds, patient resting in bed without distress.  Does not respond to voice.  Per report patient has had increased lethargy.  Family given update by previous nurse.

## 2024-04-18 NOTE — PROGRESS NOTES
"Roman Lakhani is a 89 y.o. male on day 8 of admission presenting with SDH (subdural hematoma) (Multi). Neurosurgery is re-consulted for progression of right SDH with increasing midline shift. Patient became less responsive on 04/17/2024. CT Head images reviewed and compared with prior CT Head on 04/11/2024: there is enlargement of right SDH collection with midline shift > 10 mm; near complete sulcal effacement.     As per prior neurosurgical recommendation: [facility] Neurosurgery team - extreme high risk surgical patient given PMH (HTN / HPL, cardiomyopathy, CHF, GERD, DM2, BPH, anxiety / depression, schizophrenia, dementia) and age; comfort care is indicated (he is currently DNR).     Subjective   No adverse events overnight / no improvement     Objective     Physical Exam  Well nourished, well developed male, resting in bed. Does not follow commands  Does not open eyes. LUE is flaccid (stable from prior exam)  Left facial droop. PERRL. Does not speak. Moans with light noxious stimulation  Skin is warm / dry  Thorax is midline; chest expansion is symmetric  Moves RUE spontaneously  Abdomen is not distended  Urine - urinary diversion in place    Last Recorded Vitals  Blood pressure 132/60, pulse 89, temperature 37.2 °C (99 °F), resp. rate 18, height 1.676 m (5' 5.98\"), weight 81.6 kg (179 lb 14.3 oz), SpO2 94%.  Intake/Output last 3 Shifts:  I/O last 3 completed shifts:  In: 2278.2 (27.9 mL/kg) [I.V.:2278.2 (27.9 mL/kg)]  Out: 900 (11 mL/kg) [Urine:900 (0.3 mL/kg/hr)]  Weight: 81.6 kg     Relevant Results  Results for orders placed or performed during the hospital encounter of 04/10/24 (from the past 24 hour(s))   CBC   Result Value Ref Range    WBC 8.7 4.4 - 11.3 x10*3/uL    nRBC 0.0 0.0 - 0.0 /100 WBCs    RBC 3.72 (L) 4.50 - 5.90 x10*6/uL    Hemoglobin 11.0 (L) 13.5 - 17.5 g/dL    Hematocrit 33.2 (L) 41.0 - 52.0 %    MCV 89 80 - 100 fL    MCH 29.6 26.0 - 34.0 pg    MCHC 33.1 32.0 - 36.0 g/dL    RDW 13.7 11.5 - " 14.5 %    Platelets 263 150 - 450 x10*3/uL   Comprehensive Metabolic Panel   Result Value Ref Range    Glucose 120 (H) 74 - 99 mg/dL    Sodium 134 (L) 136 - 145 mmol/L    Potassium 4.5 3.5 - 5.3 mmol/L    Chloride 98 98 - 107 mmol/L    Bicarbonate 29 21 - 32 mmol/L    Anion Gap 12 10 - 20 mmol/L    Urea Nitrogen 12 6 - 23 mg/dL    Creatinine 0.95 0.50 - 1.30 mg/dL    eGFR 75 >60 mL/min/1.73m*2    Calcium 8.6 8.6 - 10.3 mg/dL    Albumin 3.4 3.4 - 5.0 g/dL    Alkaline Phosphatase 74 33 - 136 U/L    Total Protein 5.5 (L) 6.4 - 8.2 g/dL    AST 13 9 - 39 U/L    Bilirubin, Total 1.1 0.0 - 1.2 mg/dL    ALT 10 10 - 52 U/L   POCT GLUCOSE   Result Value Ref Range    POCT Glucose 127 (H) 74 - 99 mg/dL   POCT GLUCOSE   Result Value Ref Range    POCT Glucose 139 (H) 74 - 99 mg/dL   POCT GLUCOSE   Result Value Ref Range    POCT Glucose 238 (H) 74 - 99 mg/dL     CT HEAD on 04/17/2024: images reviewed (see HPI)  IMPRESSION:  Worsening mass effect with midline shift to the left with a acute on  chronic subdural hematoma now slightly increased in size. Persistent  subfalcine herniation.      Findings global volume loss with features associated with dementia  Signed by: Roberto Ruelas 4/17/2024    Assessment/Plan   Principal Problem:    SDH (subdural hematoma) (Multi)  Right SDH with progression. High Risk surgical candidate (age, co-morbidities)  - Recommend Hospice / Comfort Care     I spent > 25 minutes in the professional and overall care of this patient.      Claudine Dillard, APRN-CNP

## 2024-04-18 NOTE — PROGRESS NOTES
Occupational Therapy                 Therapy Communication Note    Patient Name: Roman Lakhani  MRN: [id number]  Today's Date: 4/18/2024     Discipline: Occupational Therapy    Missed Visit Reason: Missed Visit Reason:  (per medical chart, hospice/comfort care is recommended for this patient at this time. will continue to follow/attempt as appropriate pending patient/family decision)

## 2024-04-18 NOTE — PROGRESS NOTES
Physical Therapy                 Therapy Communication Note    Patient Name: Roman Lakhani  MRN: [id number]  Today's Date: 4/18/2024     Discipline: Physical Therapy    Missed Visit Reason: Missed Visit Reason:  (per 4/18/24 neurosx note:Right SDH with progression. High Risk surgical candidate (age, co-morbidities)  - Recommend Hospice / Comfort Care; PT treatment deferred, await pt/family decision for plan of care.)

## 2024-04-19 LAB
GLUCOSE BLD MANUAL STRIP-MCNC: 138 MG/DL (ref 74–99)
GLUCOSE BLD MANUAL STRIP-MCNC: 154 MG/DL (ref 74–99)
GLUCOSE BLD MANUAL STRIP-MCNC: 190 MG/DL (ref 74–99)
GLUCOSE BLD MANUAL STRIP-MCNC: 190 MG/DL (ref 74–99)
GLUCOSE BLD MANUAL STRIP-MCNC: 197 MG/DL (ref 74–99)

## 2024-04-19 PROCEDURE — 92526 ORAL FUNCTION THERAPY: CPT | Mod: GN

## 2024-04-19 PROCEDURE — 82947 ASSAY GLUCOSE BLOOD QUANT: CPT

## 2024-04-19 PROCEDURE — 92610 EVALUATE SWALLOWING FUNCTION: CPT | Mod: GN

## 2024-04-19 PROCEDURE — 2500000006 HC RX 250 W HCPCS SELF ADMINISTERED DRUGS (ALT 637 FOR ALL PAYERS): Performed by: NURSE PRACTITIONER

## 2024-04-19 PROCEDURE — 2500000001 HC RX 250 WO HCPCS SELF ADMINISTERED DRUGS (ALT 637 FOR MEDICARE OP): Performed by: NURSE PRACTITIONER

## 2024-04-19 PROCEDURE — 2500000004 HC RX 250 GENERAL PHARMACY W/ HCPCS (ALT 636 FOR OP/ED): Performed by: INTERNAL MEDICINE

## 2024-04-19 PROCEDURE — 2500000006 HC RX 250 W HCPCS SELF ADMINISTERED DRUGS (ALT 637 FOR ALL PAYERS): Mod: MUE | Performed by: NURSE PRACTITIONER

## 2024-04-19 PROCEDURE — 2500000002 HC RX 250 W HCPCS SELF ADMINISTERED DRUGS (ALT 637 FOR MEDICARE OP, ALT 636 FOR OP/ED): Performed by: NURSE PRACTITIONER

## 2024-04-19 PROCEDURE — 2060000001 HC INTERMEDIATE ICU ROOM DAILY

## 2024-04-19 RX ADMIN — INSULIN HUMAN 2 UNITS: 100 INJECTION, SOLUTION PARENTERAL at 17:00

## 2024-04-19 RX ADMIN — DONEPEZIL HYDROCHLORIDE 10 MG: 10 TABLET ORAL at 11:51

## 2024-04-19 RX ADMIN — ATORVASTATIN CALCIUM 40 MG: 40 TABLET, FILM COATED ORAL at 11:50

## 2024-04-19 RX ADMIN — DEXTROSE AND SODIUM CHLORIDE 60 ML/HR: 5; 450 INJECTION, SOLUTION INTRAVENOUS at 17:04

## 2024-04-19 RX ADMIN — SPIRONOLACTONE 25 MG: 25 TABLET, FILM COATED ORAL at 11:51

## 2024-04-19 RX ADMIN — INSULIN HUMAN 2 UNITS: 100 INJECTION, SOLUTION PARENTERAL at 11:52

## 2024-04-19 RX ADMIN — DEXTROSE AND SODIUM CHLORIDE 60 ML/HR: 5; 450 INJECTION, SOLUTION INTRAVENOUS at 00:37

## 2024-04-19 RX ADMIN — INSULIN HUMAN 2 UNITS: 100 INJECTION, SOLUTION PARENTERAL at 09:26

## 2024-04-19 RX ADMIN — FAMOTIDINE 20 MG: 20 TABLET ORAL at 11:51

## 2024-04-19 RX ADMIN — TRIAMCINOLONE ACETONIDE: 1 OINTMENT TOPICAL at 09:28

## 2024-04-19 RX ADMIN — INSULIN GLARGINE 17 UNITS: 100 INJECTION, SOLUTION SUBCUTANEOUS at 17:00

## 2024-04-19 RX ADMIN — LISINOPRIL 5 MG: 5 TABLET ORAL at 11:51

## 2024-04-19 ASSESSMENT — PAIN SCALES - WONG BAKER: WONGBAKER_NUMERICALRESPONSE: NO HURT

## 2024-04-19 ASSESSMENT — PAIN SCALES - GENERAL: PAINLEVEL_OUTOF10: 0 - NO PAIN

## 2024-04-19 NOTE — CONSULTS
"Nutrition Follow Up Assessment:   Nutrition Assessment         Patient is a 89 y.o. male presenting with subdural hematoma       Nutrition History:  Food and Nutrient History: Pt is more lethargic with a worsening mass effect.  IV fluids initiated due to decreased intake.  Hospice was consulted today  Food Allergies/Intolerances:  None  GI Symptoms: None  Oral Problems: Swallowing difficulty       Anthropometrics:  Height: 167.6 cm (5' 5.98\")   Weight: 81.6 kg (179 lb 14.3 oz)   BMI (Calculated): 29.05  IBW/kg (Dietitian Calculated): 64 kg  Percent of IBW: 122 %       Weight History:     Weight Change %:       Nutrition Focused Physical Exam Findings:    Subcutaneous Fat Loss:      Muscle Wasting:     Edema:     Physical Findings:       Nutrition Significant Labs:  BMP Trend:   Results from last 7 days   Lab Units 04/18/24  0536 04/17/24  0547 04/15/24  0626 04/14/24  0608   GLUCOSE mg/dL 223* 120* 87 129*   CALCIUM mg/dL 8.9 8.6 8.6 8.6   SODIUM mmol/L 132* 134* 134* 134*   POTASSIUM mmol/L 4.3 4.5 4.1 4.3   CO2 mmol/L 26 29 30 28   CHLORIDE mmol/L 97* 98 99 99   BUN mg/dL 15 12 13 16   CREATININE mg/dL 0.96 0.95 0.96 1.09        Nutrition Specific Medications:  Aricept; pepcid; lantus; hum r; IV fluid     I/O:   Last BM Date: 04/15/24; Stool Appearance: Soft (04/18/24 1555)    Dietary Orders (From admission, onward)       Start     Ordered    04/13/24 0952  Adult diet Carb Controlled; 60 gram carb/meal, 30 gram Carb evening snack; Pureed 4; Thin 0; 1:1 Feeding, No straw  Diet effective now        Comments: Pills crushed in puree carrier   Question Answer Comment   Diet type Carb Controlled    Carb diet selection: 60 gram carb/meal, 30 gram Carb evening snack    Texture Pureed 4    Fluid consistency Thin 0    Select tray type: 1:1 Feeding    Select tray type: No straw        04/13/24 0952                     Estimated Needs:      Method for Estimating Needs: 8897-5999  26-30 gm kg of IBW     Method for Estimating " Needs: 64-77   1-1.2 gm kg of IBW     Method for Estimating Needs: 3621-7817   20-30 ml kg of IBW        Nutrition Diagnosis        Nutrition Diagnosis  Patient has Nutrition Diagnosis: Yes  Diagnosis Status (1): Ongoing  Nutrition Diagnosis 1: Swallowing difficulity  Related to (1): motor causes  As Evidenced by (1): pt was started on a pureed diet       Nutrition Interventions/Recommendations         Nutrition Prescription:  Individualized Nutrition Prescription Provided for : Hospice was consulted today,  Will follow clinical course for any possible nutrition intervention needs          Nutrition Education:          Nutrition Monitoring and Evaluation   Food/Nutrient Related History Monitoring  Monitoring and Evaluation Plan: Energy intake, Fluid intake, Amount of food         Biochemical Data, Medical Tests and Procedures  Monitoring and Evaluation Plan: Electrolyte/renal panel, Glucose/endocrine profile            Time Spent/Follow-up Reminder:   Time Spent (min): 30 minutes  Last Date of Nutrition Visit: 04/18/24  Nutrition Follow-Up Needed?: 3-5 days  Follow up Comment: 4/22  MZ

## 2024-04-19 NOTE — NURSING NOTE
With rounds, patient resting in bed with no acute distress noted.  Visiting with family at shift change.

## 2024-04-19 NOTE — PROGRESS NOTES
Speech-Language Pathology    SLP Adult Inpatient Speech-Language Pathology Clinical Swallow Evaluation    Patient Name: Roman Lakhani  MRN: [id number]  Today's Date: 4/19/2024   Time Calculation  Start Time: 1015  Stop Time: 1045  Time Calculation (min): 30 min       Increased SDH from the admit.   Current Problem:   1. SDH (subdural hematoma) (Multi)  insulin regular (HumuLIN R,NovoLIN R) 100 unit/mL injection    triamcinolone (Kenalog) 0.1 % ointment    CBC    Comprehensive metabolic panel            Recommendations:  Solid Diet Recommendations : Pureed/extremely thick  (IDDSI Level 4)  Liquid Diet Recommendations: Nectar thick/mildly thick (IDDS Level 2)  Compensatory Swallowing Strategies: One to one assist with meals, Single sips, Small bites/sips, Eat/feed slowly, Upright 90 degrees as possible for all oral intake, Full supervision with meals, Remain upright for 20-30 minutes after meals  Medication Administration Recommendations: Crushed, With Pureed      Assessment: Oral Dysphagia Ongoing      ST reconsulted  2/2 family reporting inconsistent coughing across thins when fed but good PO tolerance of the Purees by tsp per family reported . Pt p/w   a SDH increase in size per imaging completed 4/17/24 and  worsening lethargy the past few days. Today , eyes close at times but pt alerts easily and is receptive to all PO.   RN noted an encounter of coughing with the crushed med pass given in puree yesterday .  Today, pt following basic oral lingual commands per dtr Marybeth translating with Upper and Lower dentures in place. Pt Maori speaking only. Meeting with Hospice plan this date d/t prognosis is guarded.   CSE reveals:   Moderate Oral Dysphagia evident compounded by a behavioral component d/t the pt's baseline dementia with mentation further impaired d/t the chronic SDH.  Oral phase of the swallow deficits include uncoordinated oral motor skills with  intermittent perseverative  bolus manipulation skills  across the purees with tsp boluses of thins , Mildly thick liquids ,and purees swallowed post a varying oral delay. No s/s of aspiration noted across thins , nectars, or purees fed to the pt by this SLP or family via straw or tsp .   Nectars / purees  best oral diet at this time to minimize inconsistent s/sx of aspiration as  pt is at risk without strict adherence to the established safe swallowing controls.     Suggest an oral diet of Puree and Nectars d/t the slower moving bolus to aid the oral swallow bolus control .    No s/sx of aspiration exhibited  today per tsp or straw sips/bites of the thins , Nectars or purees but pt at  a heightened risk without strict adherence to the established safe swallowing controls.    SAFE SWALLOW GUIDELINES :   *Only feed /present PO when pt awake or receptive to PO .   *1: 1 Feed   Upright postioning   Alternate every sip /bite.  Feed slowly via small bites/sips -straws ok       Plan:  Inpatient/Swing Bed or Outpatient: Inpatient  Treatment/Interventions: Patient/family education  SLP Plan: Skilled SLP  SLP Frequency: 2x per week  Diet Recommendations: Solid  Solid Consistency: Pureed/extremely thick (IDDSI Level 4)  Liquid Consistency: Nectar thick/mildly thick (IDDS Level 2)  Discussed POC: Caregiver/family, Nursing, Physician  Discussed Risks/Benefits: Yes, Caregiver/Family, Nursing  Patient/Caregiver Agreeable: Yes      Dysphagia Goals (established 4/19):      Pt/caregiver education. - Goal Ongoing and reinitiated 4/19/24  2. Pt will tolerate the prescribed diet without overt s/s of aspiration on all p.o. intake given 1:1 assist and strict adherence to swallow controls to aid the oral swallow efficiency -timing.       Subjective   Current Problem:  Dysphagia and AMS      CT Head completed 4/17/24 :       FINDINGS:  Right-sided subdural hematoma again seen. It measures about 3.1 cm in  greatest thickness with slight increase in midline shift to the left  now measuring 1.1  cm. Increasing mass effect on the right lateral  ventricle with obliteration. High attenuation seen layering  dependently compatible with a acute on chronic hemorrhage in the  right subdural space..      Global volume loss detected in the brain. Pattern can be seen the  setting of dementia with prominence of the temporal horns. No  intraventricular hemorrhage. No intraparenchymal hemorrhage. Evidence  of acute skull fracture. Paranasal sinuses and mastoids otherwise  clear.              IMPRESSION:  Worsening mass effect with midline shift to the left with a acute on  chronic subdural hematoma now slightly increased in size. Persistent  subfalcine herniation.      Findings global volume loss with features associated with dementia      General Visit Information:  Patient Class: Inpatient  Living Environment: Nursing home (skilled/long-term) ([facility])  Caregiver Feedback: per RN, ate breakfast with assist this moring without concern for aspiration. After PT/OT sat him at edge of bed, he vomited with concern for aspiraiton of vomit. He is lethargic now, praying softly, eyes are glassy, difficult to arouse. Rn is aware/present.  Referred By: JETT Wolff-CNP  Past Medical History Relevant to Rehab: 89 y.o. Gambian speaking male with past medical history significant for dementia, CKD, DM, COPD w/ chronic resp failure (O2 dependent), paranoid schizophrenia, cardiomyopathy, anxiety/depression, HTN, HLD, BPH, and GERD who presented to [facility] ED 4/10 from [facility] s/p unwitnessed fall. Patient pan scanned with the following significant results: acute on chronic right frontoparietal SDH measuring up to 2.8cm as well as cerebral sulcal effacement, narrowing of the right lateral and 3rd ventricles and 5mm leftward midline shift.  Prior to Session Communication: Bedside nurse  BaseLine Diet: Puree/thins given total feed A.  Current Diet : NPO  Dysphagia Diagnosis:  (unsafe for an oral diet given  change in mentation, DIANE.)      Objective       Baseline Assessment:  Respiratory Status: Room air  Behavior/Cognition: Confused, Distractible, Requires cueing  Patient Positioning: Upright in Bed      Pain:  Pain Score: 0 - No pain  Ricardo-Baker FACES Pain Rating: No hurt       Oral/Motor Assessment:  Dentition: Dentures (Lower Full), Dentures (Upper Full)      Consistencies Trialed:   Thins , Nectars , Purees       Clinical Observations:  Patient Positioning: Upright in Bed  Management of Oral Secretions: Adequate  Was The 3 oz Swallow Protocol Completed: No    Inpatient:  Education Documentation  SLP has provided pt and caregivers  ( dtr and the son in law) and the RN Mark Newton with the results and recommendations of this session.          Consultations/Referrals/Coordination of Services: [facility] discussion -Hospice meeting planned for this date.         Dysphagia therapy :  Worked with family further to ensure swallowing controls implemented to manage to the oral phase deficits given total feed assist with the new further modified oral diet.    Feeding 1 bite/1 sip at a time alternating each and allowing time for the oral posterior transport. Good comprehension demonstrated to only feed when pt awake and receptive to PO. Teach back demonstration completed.

## 2024-04-19 NOTE — PROGRESS NOTES
Roman Lakhani is a 89 y.o. male on day 8 of admission presenting with SDH (subdural hematoma) (Multi).      Subjective   Seen today 89-year-old with discharge planning hospice care has been brought up I will go ahead and make referral to hospice care today   He presented with subdural hematoma he is encephalopathic at this point    Objective     Last Recorded Vitals  /65   Pulse 75   Temp 36 °C (96.8 °F) (Temporal)   Resp 22   Wt 81.6 kg (179 lb 14.3 oz)   SpO2 98%   Intake/Output last 3 Shifts:    Intake/Output Summary (Last 24 hours) at 4/18/2024 2124  Last data filed at 4/18/2024 1848  Gross per 24 hour   Intake 881 ml   Output 600 ml   Net 281 ml       Admission Weight  Weight: 81.6 kg (180 lb) (04/10/24 0452)    Daily Weight  04/15/24 : 81.6 kg (179 lb 14.3 oz)    Image Results  CT head wo IV contrast  Narrative: Interpreted By:  Roberto Ruelas,   STUDY:  CT HEAD WO IV CONTRAST;  4/17/2024 11:41 am      INDICATION:  Signs/Symptoms:lethargy, recent subural hematoma.      COMPARISON:  04/11/2024      ACCESSION NUMBER(S):  [id number]      ORDERING CLINICIAN:  IRVING ADAM      TECHNIQUE:  Noncontrast axial CT scan of head was performed. Angled reformats in  brain and bone windows were generated. The images were reviewed in  bone, brain, blood and soft tissue windows.          FINDINGS:  Right-sided subdural hematoma again seen. It measures about 3.1 cm in  greatest thickness with slight increase in midline shift to the left  now measuring 1.1 cm. Increasing mass effect on the right lateral  ventricle with obliteration. High attenuation seen layering  dependently compatible with a acute on chronic hemorrhage in the  right subdural space..      Global volume loss detected in the brain. Pattern can be seen the  setting of dementia with prominence of the temporal horns. No  intraventricular hemorrhage. No intraparenchymal hemorrhage. Evidence  of acute skull fracture. Paranasal sinuses and mastoids  otherwise  clear.              Impression: Worsening mass effect with midline shift to the left with a acute on  chronic subdural hematoma now slightly increased in size. Persistent  subfalcine herniation.      Findings global volume loss with features associated with dementia      MACRO:  Critical Finding:  See findings. Notification was initiated on  4/17/2024 at 12:47 pm by  Roberto Ruelas.  (**-OCF-**) Instructions:      Signed by: Roberto Ruelas 4/17/2024 12:47 PM  Dictation workstation:   [id number]      Physical Exam  Very lethargic skin dry and pale lungs are clear anteriorly heart has a regular rate and rhythm  Relevant Results               Assessment/Plan   This patient currently has cardiac telemetry ordered; if you would like to modify or discontinue the telemetry order, click here to go to the orders activity to modify/discontinue the order.              Principal Problem:    SDH (subdural hematoma) (Multi)    So far I will go ahead and make referral to [facility] hospice  Comfort and supportive measures to be implemented monitor his vitals continue with the same present care and therapy and management thank you            Oren Unger, DO

## 2024-04-19 NOTE — PROGRESS NOTES
SW met with family of patient to discuss discharge and hospice planning.  Current plan is for patient to discharge to [facility].  Currently precert is still pending.  Patient is interested in hospice services.  SW explained the various types of hospice including IPU, GIP, hospice at SNF and LTC, and home with hospice.  SW continued with an IPU, and facility placement, family in incur a cost of room and board.  SW reached out to [facility] and they stated they contract with [facility].  After further discussion, family was interested in hearing more about an inpatient unit.  Referral was made in [facility] to [facility] for informational meeting.  SW will continue to follow up and assist as needed.     UPDATE 1100: Family stated [facility] had contacted family stating they would not have a bed available in their IPU till Tuesday or Wednesday of next week.  Family requested someone from [facility] reach out to the family as well.  SW reached out to [facility] to have hospice reach out to them.    ZHENG MCGUIRE LCSW

## 2024-04-19 NOTE — CARE PLAN
Pt did not follow commands during assessment but was responsive. Patient was praying out loud and made sense, but did not respond to most questions or commands. Pt denied pain and stated he was cold.

## 2024-04-19 NOTE — PROGRESS NOTES
Roman Lakhani is a 89 y.o. male on day 9 of admission presenting with SDH (subdural hematoma) (Multi).      Subjective   Seen today overall condition same hospice referral I did make and believe social work's and case management working with the family at this time       Objective     Last Recorded Vitals  /60 (BP Location: Right arm, Patient Position: Lying)   Pulse 80   Temp 35.4 °C (95.7 °F) (Temporal)   Resp 20   Wt 81.6 kg (179 lb 14.3 oz)   SpO2 91%   Intake/Output last 3 Shifts:    Intake/Output Summary (Last 24 hours) at 4/19/2024 1235  Last data filed at 4/19/2024 0324  Gross per 24 hour   Intake 110 ml   Output 950 ml   Net -840 ml       Admission Weight  Weight: 81.6 kg (180 lb) (04/10/24 0452)    Daily Weight  04/18/24 : 81.6 kg (179 lb 14.3 oz)    Image Results  CT head wo IV contrast  Narrative: Interpreted By:  Roberto Ruelas,   STUDY:  CT HEAD WO IV CONTRAST;  4/17/2024 11:41 am      INDICATION:  Signs/Symptoms:lethargy, recent subural hematoma.      COMPARISON:  04/11/2024      ACCESSION NUMBER(S):  [id number]      ORDERING CLINICIAN:  IRVING ADAM      TECHNIQUE:  Noncontrast axial CT scan of head was performed. Angled reformats in  brain and bone windows were generated. The images were reviewed in  bone, brain, blood and soft tissue windows.          FINDINGS:  Right-sided subdural hematoma again seen. It measures about 3.1 cm in  greatest thickness with slight increase in midline shift to the left  now measuring 1.1 cm. Increasing mass effect on the right lateral  ventricle with obliteration. High attenuation seen layering  dependently compatible with a acute on chronic hemorrhage in the  right subdural space..      Global volume loss detected in the brain. Pattern can be seen the  setting of dementia with prominence of the temporal horns. No  intraventricular hemorrhage. No intraparenchymal hemorrhage. Evidence  of acute skull fracture. Paranasal sinuses and mastoids  otherwise  clear.              Impression: Worsening mass effect with midline shift to the left with a acute on  chronic subdural hematoma now slightly increased in size. Persistent  subfalcine herniation.      Findings global volume loss with features associated with dementia      MACRO:  Critical Finding:  See findings. Notification was initiated on  4/17/2024 at 12:47 pm by  Roberto Ruelas.  (**-OCF-**) Instructions:      Signed by: Roberto Ruelas 4/17/2024 12:47 PM  Dictation workstation:   [id number]    Results from last 7 days   Lab Units 04/18/24  0536   WBC AUTO x10*3/uL 8.6   HEMOGLOBIN g/dL 12.0*   HEMATOCRIT % 36.1*   PLATELETS AUTO x10*3/uL 260      Results from last 7 days   Lab Units 04/18/24  0536   SODIUM mmol/L 132*   POTASSIUM mmol/L 4.3   CHLORIDE mmol/L 97*   CO2 mmol/L 26   BUN mg/dL 15   CREATININE mg/dL 0.96   GLUCOSE mg/dL 223*   CALCIUM mg/dL 8.9      Review of systems weakness lethargy somnolence    Physical Exam  Skin is dry and pale not icteric or jaundiced lungs are clear heart has a regular rate and rhythm  Relevant Results               Assessment/Plan   This patient currently has cardiac telemetry ordered; if you would like to modify or discontinue the telemetry order, click here to go to the orders activity to modify/discontinue the order.              Principal Problem:    SDH (subdural hematoma) (Multi)    Had a subdural hematoma overall condition poor hospice referral made family now will be discussing with hospice care social work's as well as case management thank you              Oren Unger,

## 2024-04-20 VITALS
TEMPERATURE: 96.8 F | DIASTOLIC BLOOD PRESSURE: 70 MMHG | BODY MASS INDEX: 28.91 KG/M2 | WEIGHT: 179.9 LBS | HEART RATE: 76 BPM | RESPIRATION RATE: 18 BRPM | SYSTOLIC BLOOD PRESSURE: 153 MMHG | OXYGEN SATURATION: 97 % | HEIGHT: 66 IN

## 2024-04-20 LAB
GLUCOSE BLD MANUAL STRIP-MCNC: 171 MG/DL (ref 74–99)
GLUCOSE BLD MANUAL STRIP-MCNC: 172 MG/DL (ref 74–99)
GLUCOSE BLD MANUAL STRIP-MCNC: 230 MG/DL (ref 74–99)

## 2024-04-20 PROCEDURE — 2500000004 HC RX 250 GENERAL PHARMACY W/ HCPCS (ALT 636 FOR OP/ED): Performed by: INTERNAL MEDICINE

## 2024-04-20 PROCEDURE — 2500000006 HC RX 250 W HCPCS SELF ADMINISTERED DRUGS (ALT 637 FOR ALL PAYERS): Mod: MUE | Performed by: NURSE PRACTITIONER

## 2024-04-20 PROCEDURE — 2500000006 HC RX 250 W HCPCS SELF ADMINISTERED DRUGS (ALT 637 FOR ALL PAYERS): Performed by: NURSE PRACTITIONER

## 2024-04-20 PROCEDURE — 2500000002 HC RX 250 W HCPCS SELF ADMINISTERED DRUGS (ALT 637 FOR MEDICARE OP, ALT 636 FOR OP/ED): Performed by: NURSE PRACTITIONER

## 2024-04-20 PROCEDURE — 82947 ASSAY GLUCOSE BLOOD QUANT: CPT

## 2024-04-20 PROCEDURE — 2500000001 HC RX 250 WO HCPCS SELF ADMINISTERED DRUGS (ALT 637 FOR MEDICARE OP): Performed by: NURSE PRACTITIONER

## 2024-04-20 RX ADMIN — DEXTROSE AND SODIUM CHLORIDE 60 ML/HR: 5; 450 INJECTION, SOLUTION INTRAVENOUS at 09:08

## 2024-04-20 RX ADMIN — INSULIN HUMAN 4 UNITS: 100 INJECTION, SOLUTION PARENTERAL at 12:04

## 2024-04-20 RX ADMIN — ACETAMINOPHEN 650 MG: 325 TABLET ORAL at 10:17

## 2024-04-20 RX ADMIN — ATORVASTATIN CALCIUM 40 MG: 40 TABLET, FILM COATED ORAL at 08:59

## 2024-04-20 RX ADMIN — TRIAMCINOLONE ACETONIDE: 1 OINTMENT TOPICAL at 09:11

## 2024-04-20 RX ADMIN — INSULIN HUMAN 2 UNITS: 100 INJECTION, SOLUTION PARENTERAL at 17:02

## 2024-04-20 RX ADMIN — DONEPEZIL HYDROCHLORIDE 10 MG: 10 TABLET ORAL at 08:58

## 2024-04-20 RX ADMIN — FAMOTIDINE 20 MG: 20 TABLET ORAL at 08:59

## 2024-04-20 RX ADMIN — SPIRONOLACTONE 25 MG: 25 TABLET, FILM COATED ORAL at 08:59

## 2024-04-20 RX ADMIN — INSULIN HUMAN 2 UNITS: 100 INJECTION, SOLUTION PARENTERAL at 08:58

## 2024-04-20 RX ADMIN — LISINOPRIL 5 MG: 5 TABLET ORAL at 08:59

## 2024-04-20 ASSESSMENT — PAIN SCALES - WONG BAKER: WONGBAKER_NUMERICALRESPONSE: NO HURT

## 2024-04-20 ASSESSMENT — PAIN SCALES - GENERAL
PAINLEVEL_OUTOF10: 0 - NO PAIN
PAINLEVEL_OUTOF10: 0 - NO PAIN

## 2024-04-20 ASSESSMENT — PAIN - FUNCTIONAL ASSESSMENT: PAIN_FUNCTIONAL_ASSESSMENT: 0-10

## 2024-04-20 NOTE — PROGRESS NOTES
Discharge order written. Spoke with Madiha banerjee, [phone]. She notes that family has spoken with [facility] & would like to pursue placement @ [facility] ([facility] with [facility].  They deferred [facility].   Answered dtr's questions & provided support.   Messaged [facility] re: family's decision, & apprising them that we have dc order. ROBEL Valdovinos     5019, Rec'd message thru [facility] from [facility] noting that they are able to accept pt, they will need final paperwork.   As DSC notes that we need to make arrangements, have sent final paperwork thru [facility] & nsg printed paperwork. This  will be sent with with pt.   [facility] has arranged transport for 1700 via ambulance. DtrLouisa Cleary has been made aware.   [facility] to call report to [phone].   Contacted [facility] liaison & updated her on above status. ROBEL Valdovinos

## 2024-04-20 NOTE — PROGRESS NOTES
Roman Lakhani is a 89 y.o. male on day 10 of admission presenting with SDH (subdural hematoma) (Multi).      Subjective   Seen today overall condition same I spoke with his daughter today and reviewed with her that he was to go to [facility] I believe on hospice I was going to go ahead make the arrangements for that to happen       Objective     Last Recorded Vitals  /57   Pulse 82   Temp 36.5 °C (97.7 °F) (Temporal)   Resp 16   Wt 81.6 kg (179 lb 14.3 oz)   SpO2 96%   Intake/Output last 3 Shifts:    Intake/Output Summary (Last 24 hours) at 4/20/2024 1538  Last data filed at 4/20/2024 1403  Gross per 24 hour   Intake 929 ml   Output 1600 ml   Net -671 ml       Admission Weight  Weight: 81.6 kg (180 lb) (04/10/24 0452)    Daily Weight  04/18/24 : 81.6 kg (179 lb 14.3 oz)    Image Results  CT head wo IV contrast  Narrative: Interpreted By:  Roberto Ruelas,   STUDY:  CT HEAD WO IV CONTRAST;  4/17/2024 11:41 am      INDICATION:  Signs/Symptoms:lethargy, recent subural hematoma.      COMPARISON:  04/11/2024      ACCESSION NUMBER(S):  [id number]      ORDERING CLINICIAN:  IRVING ADAM      TECHNIQUE:  Noncontrast axial CT scan of head was performed. Angled reformats in  brain and bone windows were generated. The images were reviewed in  bone, brain, blood and soft tissue windows.          FINDINGS:  Right-sided subdural hematoma again seen. It measures about 3.1 cm in  greatest thickness with slight increase in midline shift to the left  now measuring 1.1 cm. Increasing mass effect on the right lateral  ventricle with obliteration. High attenuation seen layering  dependently compatible with a acute on chronic hemorrhage in the  right subdural space..      Global volume loss detected in the brain. Pattern can be seen the  setting of dementia with prominence of the temporal horns. No  intraventricular hemorrhage. No intraparenchymal hemorrhage. Evidence  of acute skull fracture. Paranasal sinuses  and mastoids otherwise  clear.              Impression: Worsening mass effect with midline shift to the left with a acute on  chronic subdural hematoma now slightly increased in size. Persistent  subfalcine herniation.      Findings global volume loss with features associated with dementia      MACRO:  Critical Finding:  See findings. Notification was initiated on  4/17/2024 at 12:47 pm by  Roberto Ruelas.  (**-OCF-**) Instructions:      Signed by: Roberto Ruelas 4/17/2024 12:47 PM  Dictation workstation:   [id number]      Physical Exam    Relevant Results               Assessment/Plan   This patient currently has cardiac telemetry ordered; if you would like to modify or discontinue the telemetry order, click here to go to the orders activity to modify/discontinue the order.              Principal Problem:    SDH (subdural hematoma) (Multi)    I would fill out the paperwork put in the discharge orders spoke with the  regarding that we will continue with all the same present care and therapy thank you              Oren Unger,

## 2024-04-27 NOTE — DOCUMENTATION CLARIFICATION NOTE
"    PATIENT:               ANDRE OMALLEY  ACCT #:                  [id number]  MRN:                       [id number]  :                       4/3/1932  ADMIT DATE:       4/10/2024 4:32 AM  DISCH DATE:        2024 6:25 PM  RESPONDING PROVIDER #:        [id number]          PROVIDER RESPONSE TEXT:    Traumatic SDH    CDI QUERY TEXT:    Clarification    Instruction:    Based on your assessment of the patient and the clinical information, please provide the requested documentation by clicking on the appropriate radio button and enter any additional information if prompted.    Question: Is there a diagnosis indicative of the clinical information    When answering this query, please exercise your independent professional judgment. The fact that a question is being asked, does not imply that any particular answer is desired or expected.    The patient's clinical indicators include:  Clinical Information:  89 y.o. Estonian speaking male with past medical history significant for dementia, CKD, DM, COPD w/ chronic resp failure (O2 dependent), paranoid schizophrenia, cardiomyopathy, anxiety/depression, HTN, HLD, BPH, and GERD who presented to [facility] ED 4/10 from [facility] s/p unwitnessed fall. Admitted for Acute on chronic 2.8 cm SDH with 5 mm leftward midline shift. Trauma services with Neurosurgery consulted.    Clinical Indicators:  Per H and P, \"Patient pan scanned with the following significant results: acute on chronic right frontoparietal SDH measuring up to 2.8cm as well as cerebral sulcal effacement, narrowing of the right lateral and 3rd ventricles and 5mm leftward midline shift.\"  CT Head 4/10/24- EXTRACRANIAL SOFT TISSUES: Small right posterior scalp hematoma.    Treatment:  Trauma services, Neurosurgery consult, repeat CT Head, q4h Neurochecks    Risk Factors:  Unwitnessed fall, advanced age, Dementia  Options provided:  -- Traumatic SDH  -- Non-traumatic SDH  -- Unable to Determine  -- " Other - I will add my own diagnosis  -- Refer to Clinical Documentation Reviewer    Query created by: Kaylene Burr on 4/15/2024 1:37 PM      Electronically signed by:  IRVING ADAM MD 4/27/2024 1:26 PM

## 2024-04-27 NOTE — DOCUMENTATION CLARIFICATION NOTE
PATIENT:               ANDRE OMALLEY  ACCT #:                  [id number]  MRN:                       [id number]  :                       4/3/1932  ADMIT DATE:       4/10/2024 4:32 AM  DISCH DATE:        2024 6:25 PM  RESPONDING PROVIDER #:        [id number]          PROVIDER RESPONSE TEXT:    Cerebral Edema    CDI QUERY TEXT:    Clarification    Instruction:    Based on your assessment of the patient and the clinical information, please provide the requested documentation by clicking on the appropriate radio button and enter any additional information if prompted.    Question: Please further clarify if there is a diagnosis related to the clinical information    When answering this query, please exercise your independent professional judgment. The fact that a question is being asked, does not imply that any particular answer is desired or expected.    The patient's clinical indicators include:  Clinical Information:  89 y.o. patient with past medical history significant for dementia, CKD, DM, COPD w/ chronic resp failure (O2 dependent), paranoid schizophrenia, cardiomyopathy, anxiety/depression, HTN, HLD, BPH, and GERD who presented to [facility] ED 4/10 from [facility] s/p unwitnessed fall. Admitted for subdural hematoma.    Clinical Indicators:  4/10 CT Head-There is an acute on chronic right frontoparietal subdural hematoma measuring up to 2.8 cm in maximal thickness along the anterolateral frontal convexity. There is cerebral sulcal effacement, narrowing of the right lateral and 3rd ventricles and 5 mm of leftward midline shift. Neurosurgical evaluation recommended.  GCS 13-14      Treatment:  Neurosurgery consult, q4h Neuro checks, CT Head, maintain HOB greater than 30 degrees    Risk Factors:  Subdural Hematoma, unwitnessed fall  Options provided:  -- Cerebral Edema  -- Radiological findings are clinically insignificant  -- Other - I will add my own diagnosis  -- Refer to Clinical  Documentation Reviewer    Query created by: Kaylene Burr on 4/15/2024 1:38 PM      Electronically signed by:  IRVING ADAM MD 4/27/2024 1:26 PM